# Patient Record
Sex: FEMALE | Race: WHITE | NOT HISPANIC OR LATINO | Employment: OTHER | RURAL
[De-identification: names, ages, dates, MRNs, and addresses within clinical notes are randomized per-mention and may not be internally consistent; named-entity substitution may affect disease eponyms.]

---

## 2017-01-10 ENCOUNTER — HISTORICAL (OUTPATIENT)
Dept: ADMINISTRATIVE | Facility: HOSPITAL | Age: 72
End: 2017-01-10

## 2017-01-11 LAB
LAB AP CLINICAL INFORMATION: NORMAL
LAB AP DIAGNOSIS - HISTORICAL: NORMAL
LAB AP GROSS PATHOLOGY - HISTORICAL: NORMAL
LAB AP SPECIMEN SUBMITTED - HISTORICAL: NORMAL

## 2018-08-29 ENCOUNTER — HISTORICAL (OUTPATIENT)
Dept: ADMINISTRATIVE | Facility: HOSPITAL | Age: 73
End: 2018-08-29

## 2020-06-23 ENCOUNTER — HISTORICAL (OUTPATIENT)
Dept: ADMINISTRATIVE | Facility: HOSPITAL | Age: 75
End: 2020-06-23

## 2020-08-25 ENCOUNTER — HISTORICAL (OUTPATIENT)
Dept: ADMINISTRATIVE | Facility: HOSPITAL | Age: 75
End: 2020-08-25

## 2020-08-27 LAB
INSULIN SERPL-ACNC: NORMAL U[IU]/ML
LAB AP CLINICAL INFORMATION: NORMAL
LAB AP COMMENTS: NORMAL
LAB AP DIAGNOSIS - HISTORICAL: NORMAL
LAB AP GROSS PATHOLOGY - HISTORICAL: NORMAL
LAB AP SPECIMEN SUBMITTED - HISTORICAL: NORMAL

## 2021-04-01 ENCOUNTER — OFFICE VISIT (OUTPATIENT)
Dept: FAMILY MEDICINE | Facility: CLINIC | Age: 76
End: 2021-04-01
Payer: MEDICARE

## 2021-04-01 ENCOUNTER — APPOINTMENT (OUTPATIENT)
Dept: RADIOLOGY | Facility: CLINIC | Age: 76
End: 2021-04-01
Attending: FAMILY MEDICINE
Payer: MEDICARE

## 2021-04-01 ENCOUNTER — HOSPITAL ENCOUNTER (OUTPATIENT)
Dept: RADIOLOGY | Facility: HOSPITAL | Age: 76
Discharge: HOME | End: 2021-04-01
Attending: FAMILY MEDICINE
Payer: MEDICARE

## 2021-04-01 VITALS
HEART RATE: 61 BPM | OXYGEN SATURATION: 98 % | TEMPERATURE: 98 F | SYSTOLIC BLOOD PRESSURE: 144 MMHG | BODY MASS INDEX: 22.29 KG/M2 | DIASTOLIC BLOOD PRESSURE: 80 MMHG | HEIGHT: 67 IN | RESPIRATION RATE: 18 BRPM | WEIGHT: 142 LBS

## 2021-04-01 DIAGNOSIS — M54.9 BACK PAIN, UNSPECIFIED BACK LOCATION, UNSPECIFIED BACK PAIN LATERALITY, UNSPECIFIED CHRONICITY: Primary | ICD-10-CM

## 2021-04-01 DIAGNOSIS — M54.9 DORSALGIA, UNSPECIFIED: ICD-10-CM

## 2021-04-01 DIAGNOSIS — M54.50 LOW BACK PAIN WITHOUT SCIATICA, UNSPECIFIED BACK PAIN LATERALITY, UNSPECIFIED CHRONICITY: Chronic | ICD-10-CM

## 2021-04-01 DIAGNOSIS — M54.9 BACK PAIN, UNSPECIFIED BACK LOCATION, UNSPECIFIED BACK PAIN LATERALITY, UNSPECIFIED CHRONICITY: ICD-10-CM

## 2021-04-01 DIAGNOSIS — M47.817 SPONDYLOSIS WITHOUT MYELOPATHY OR RADICULOPATHY, LUMBOSACRAL REGION: ICD-10-CM

## 2021-04-01 PROCEDURE — 72100 X-RAY EXAM L-S SPINE 2/3 VWS: CPT | Mod: 26,,, | Performed by: RADIOLOGY

## 2021-04-01 PROCEDURE — 72100 XR LUMBAR SPINE 2 OR 3 VIEWS: ICD-10-PCS | Mod: 26,,, | Performed by: RADIOLOGY

## 2021-04-01 PROCEDURE — 99213 PR OFFICE/OUTPT VISIT, EST, LEVL III, 20-29 MIN: ICD-10-PCS | Mod: ,,, | Performed by: FAMILY MEDICINE

## 2021-04-01 PROCEDURE — 72100 X-RAY EXAM L-S SPINE 2/3 VWS: CPT | Mod: TC,RHCUB | Performed by: FAMILY MEDICINE

## 2021-04-01 PROCEDURE — 99213 OFFICE O/P EST LOW 20 MIN: CPT | Mod: ,,, | Performed by: FAMILY MEDICINE

## 2021-04-01 RX ORDER — MECLIZINE HYDROCHLORIDE 25 MG/1
25 TABLET ORAL 3 TIMES DAILY PRN
COMMUNITY
End: 2022-03-02 | Stop reason: SDUPTHER

## 2021-04-01 RX ORDER — POLYETHYLENE GLYCOL 3350 17 G/17G
POWDER, FOR SOLUTION ORAL
COMMUNITY
End: 2023-11-21

## 2021-04-01 RX ORDER — FLUTICASONE PROPIONATE 50 MCG
1 SPRAY, SUSPENSION (ML) NASAL DAILY
COMMUNITY
End: 2021-04-09 | Stop reason: SDUPTHER

## 2021-04-05 DIAGNOSIS — F01.518 VASCULAR DEMENTIA WITH BEHAVIOR DISTURBANCE: Primary | ICD-10-CM

## 2021-04-05 RX ORDER — MEMANTINE HYDROCHLORIDE 10 MG/1
10 TABLET ORAL 2 TIMES DAILY
Qty: 180 TABLET | Refills: 3 | Status: SHIPPED | OUTPATIENT
Start: 2021-04-05 | End: 2022-03-02 | Stop reason: SDUPTHER

## 2021-04-05 RX ORDER — DONEPEZIL HYDROCHLORIDE 10 MG/1
10 TABLET, FILM COATED ORAL NIGHTLY
Qty: 90 TABLET | Refills: 3 | Status: SHIPPED | OUTPATIENT
Start: 2021-04-05 | End: 2022-03-02 | Stop reason: SDUPTHER

## 2021-04-09 DIAGNOSIS — K21.9 GASTROESOPHAGEAL REFLUX DISEASE, UNSPECIFIED WHETHER ESOPHAGITIS PRESENT: ICD-10-CM

## 2021-04-09 DIAGNOSIS — I10 ESSENTIAL HYPERTENSION: Primary | ICD-10-CM

## 2021-04-09 DIAGNOSIS — E78.2 MIXED HYPERLIPIDEMIA: ICD-10-CM

## 2021-04-09 DIAGNOSIS — E87.6 HYPOKALEMIA: ICD-10-CM

## 2021-04-09 DIAGNOSIS — J30.9 ALLERGIC RHINITIS, UNSPECIFIED SEASONALITY, UNSPECIFIED TRIGGER: ICD-10-CM

## 2021-04-09 DIAGNOSIS — F32.A DEPRESSION, UNSPECIFIED DEPRESSION TYPE: ICD-10-CM

## 2021-04-09 RX ORDER — HYDROCHLOROTHIAZIDE 25 MG/1
25 TABLET ORAL DAILY
Qty: 90 TABLET | Refills: 3 | Status: SHIPPED | OUTPATIENT
Start: 2021-04-09 | End: 2022-03-02 | Stop reason: SDUPTHER

## 2021-04-09 RX ORDER — FLUTICASONE PROPIONATE 50 MCG
1 SPRAY, SUSPENSION (ML) NASAL DAILY
Qty: 48 G | Refills: 3 | Status: SHIPPED | OUTPATIENT
Start: 2021-04-09

## 2021-04-09 RX ORDER — ATORVASTATIN CALCIUM 40 MG/1
40 TABLET, FILM COATED ORAL DAILY
Qty: 90 TABLET | Refills: 3 | Status: SHIPPED | OUTPATIENT
Start: 2021-04-09 | End: 2022-03-02 | Stop reason: SDUPTHER

## 2021-04-09 RX ORDER — POTASSIUM CHLORIDE 750 MG/1
10 CAPSULE, EXTENDED RELEASE ORAL 2 TIMES DAILY
Qty: 180 CAPSULE | Refills: 3 | Status: SHIPPED | OUTPATIENT
Start: 2021-04-09 | End: 2022-03-02 | Stop reason: SDUPTHER

## 2021-04-09 RX ORDER — ESOMEPRAZOLE MAGNESIUM 40 MG/1
40 CAPSULE, DELAYED RELEASE ORAL
Qty: 90 CAPSULE | Refills: 3 | Status: SHIPPED | OUTPATIENT
Start: 2021-04-09 | End: 2021-04-16 | Stop reason: CLARIF

## 2021-04-09 RX ORDER — CITALOPRAM 40 MG/1
40 TABLET, FILM COATED ORAL DAILY
Qty: 90 TABLET | Refills: 3 | Status: SHIPPED | OUTPATIENT
Start: 2021-04-09 | End: 2022-03-02 | Stop reason: SDUPTHER

## 2021-05-04 ENCOUNTER — HOSPITAL ENCOUNTER (OUTPATIENT)
Dept: RADIOLOGY | Facility: HOSPITAL | Age: 76
Discharge: HOME | End: 2021-05-04
Attending: NURSE PRACTITIONER
Payer: MEDICARE

## 2021-05-04 ENCOUNTER — OFFICE VISIT (OUTPATIENT)
Dept: GASTROENTEROLOGY | Facility: CLINIC | Age: 76
End: 2021-05-04
Payer: MEDICARE

## 2021-05-04 VITALS
SYSTOLIC BLOOD PRESSURE: 116 MMHG | HEIGHT: 66 IN | WEIGHT: 135 LBS | BODY MASS INDEX: 21.69 KG/M2 | OXYGEN SATURATION: 94 % | HEART RATE: 79 BPM | DIASTOLIC BLOOD PRESSURE: 72 MMHG

## 2021-05-04 DIAGNOSIS — K59.00 CONSTIPATION, UNSPECIFIED CONSTIPATION TYPE: ICD-10-CM

## 2021-05-04 DIAGNOSIS — Z86.010 PERSONAL HISTORY OF COLONIC POLYPS: ICD-10-CM

## 2021-05-04 DIAGNOSIS — K59.00 CONSTIPATION, UNSPECIFIED CONSTIPATION TYPE: Primary | ICD-10-CM

## 2021-05-04 PROBLEM — Z86.0100 PERSONAL HISTORY OF COLONIC POLYPS: Status: ACTIVE | Noted: 2021-05-04

## 2021-05-04 PROCEDURE — 74019 RADEX ABDOMEN 2 VIEWS: CPT | Mod: TC

## 2021-05-04 PROCEDURE — 99214 PR OFFICE/OUTPT VISIT, EST, LEVL IV, 30-39 MIN: ICD-10-PCS | Mod: ,,, | Performed by: NURSE PRACTITIONER

## 2021-05-04 PROCEDURE — 74019 XR ABDOMEN FLAT AND ERECT: ICD-10-PCS | Mod: 26,,, | Performed by: RADIOLOGY

## 2021-05-04 PROCEDURE — 74019 RADEX ABDOMEN 2 VIEWS: CPT | Mod: 26,,, | Performed by: RADIOLOGY

## 2021-05-04 PROCEDURE — 99214 OFFICE O/P EST MOD 30 MIN: CPT | Mod: ,,, | Performed by: NURSE PRACTITIONER

## 2021-05-05 ENCOUNTER — TELEPHONE (OUTPATIENT)
Dept: GASTROENTEROLOGY | Facility: CLINIC | Age: 76
End: 2021-05-05

## 2021-05-17 PROCEDURE — 90853 GROUP PSYCHOTHERAPY: CPT

## 2021-05-19 PROCEDURE — 90853 GROUP PSYCHOTHERAPY: CPT

## 2021-05-20 PROCEDURE — 90853 GROUP PSYCHOTHERAPY: CPT

## 2021-05-25 ENCOUNTER — OFFICE VISIT (OUTPATIENT)
Dept: NEUROLOGY | Facility: CLINIC | Age: 76
End: 2021-05-25
Payer: MEDICARE

## 2021-05-25 VITALS
DIASTOLIC BLOOD PRESSURE: 78 MMHG | HEART RATE: 72 BPM | HEIGHT: 67 IN | BODY MASS INDEX: 21.5 KG/M2 | SYSTOLIC BLOOD PRESSURE: 126 MMHG | WEIGHT: 137 LBS | OXYGEN SATURATION: 96 % | RESPIRATION RATE: 18 BRPM

## 2021-05-25 DIAGNOSIS — G47.00 INSOMNIA, UNSPECIFIED TYPE: ICD-10-CM

## 2021-05-25 DIAGNOSIS — F02.818 EARLY ONSET ALZHEIMER'S DISEASE WITH BEHAVIORAL DISTURBANCE: Primary | ICD-10-CM

## 2021-05-25 DIAGNOSIS — G30.0 EARLY ONSET ALZHEIMER'S DISEASE WITH BEHAVIORAL DISTURBANCE: Primary | ICD-10-CM

## 2021-05-25 PROCEDURE — 99214 OFFICE O/P EST MOD 30 MIN: CPT | Mod: PBBFAC | Performed by: NURSE PRACTITIONER

## 2021-05-25 PROCEDURE — 90853 GROUP PSYCHOTHERAPY: CPT

## 2021-05-25 PROCEDURE — 99214 PR OFFICE/OUTPT VISIT, EST, LEVL IV, 30-39 MIN: ICD-10-PCS | Mod: S$PBB,,, | Performed by: NURSE PRACTITIONER

## 2021-05-25 PROCEDURE — 99214 OFFICE O/P EST MOD 30 MIN: CPT | Mod: S$PBB,,, | Performed by: NURSE PRACTITIONER

## 2021-05-25 RX ORDER — TRAZODONE HYDROCHLORIDE 50 MG/1
TABLET ORAL
Qty: 60 TABLET | Refills: 5 | Status: SHIPPED | OUTPATIENT
Start: 2021-05-25 | End: 2021-06-28 | Stop reason: SDUPTHER

## 2021-05-25 RX ORDER — TRAZODONE HYDROCHLORIDE 50 MG/1
50 TABLET ORAL NIGHTLY
Qty: 30 TABLET | Refills: 11 | Status: SHIPPED | OUTPATIENT
Start: 2021-05-25 | End: 2021-05-25 | Stop reason: CLARIF

## 2021-05-28 PROCEDURE — 90853 GROUP PSYCHOTHERAPY: CPT

## 2021-06-01 PROCEDURE — 90853 GROUP PSYCHOTHERAPY: CPT

## 2021-06-04 RX ORDER — ONDANSETRON 4 MG/1
4 TABLET, FILM COATED ORAL EVERY 8 HOURS PRN
Qty: 30 TABLET | Refills: 0 | Status: SHIPPED | OUTPATIENT
Start: 2021-06-04 | End: 2022-03-02

## 2021-06-10 PROCEDURE — 90853 GROUP PSYCHOTHERAPY: CPT

## 2021-06-11 PROCEDURE — 90853 GROUP PSYCHOTHERAPY: CPT

## 2021-06-24 PROCEDURE — 90832 PSYTX W PT 30 MINUTES: CPT | Mod: 59

## 2021-06-24 PROCEDURE — 90853 GROUP PSYCHOTHERAPY: CPT

## 2021-06-25 PROCEDURE — 90853 GROUP PSYCHOTHERAPY: CPT

## 2021-06-28 DIAGNOSIS — G47.00 INSOMNIA, UNSPECIFIED TYPE: ICD-10-CM

## 2021-06-28 RX ORDER — TRAZODONE HYDROCHLORIDE 50 MG/1
100 TABLET ORAL NIGHTLY
Qty: 180 TABLET | Refills: 3 | Status: SHIPPED | OUTPATIENT
Start: 2021-06-28 | End: 2022-03-02 | Stop reason: SDUPTHER

## 2021-06-29 PROCEDURE — 90853 GROUP PSYCHOTHERAPY: CPT

## 2021-07-01 PROCEDURE — 90853 GROUP PSYCHOTHERAPY: CPT

## 2021-07-06 PROCEDURE — 90853 GROUP PSYCHOTHERAPY: CPT

## 2021-07-08 PROCEDURE — 90853 GROUP PSYCHOTHERAPY: CPT

## 2021-07-11 ENCOUNTER — APPOINTMENT (OUTPATIENT)
Dept: RADIOLOGY | Facility: CLINIC | Age: 76
End: 2021-07-11
Attending: NURSE PRACTITIONER
Payer: MEDICARE

## 2021-07-11 ENCOUNTER — OFFICE VISIT (OUTPATIENT)
Dept: FAMILY MEDICINE | Facility: CLINIC | Age: 76
End: 2021-07-11
Payer: MEDICARE
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
## 2021-07-11 DIAGNOSIS — M25.531 WRIST PAIN, ACUTE, RIGHT: Primary | ICD-10-CM

## 2021-07-11 DIAGNOSIS — M25.531 WRIST PAIN, ACUTE, RIGHT: ICD-10-CM

## 2021-07-11 PROCEDURE — 96372 PR INJECTION,THERAP/PROPH/DIAG2ST, IM OR SUBCUT: ICD-10-PCS | Mod: ,,, | Performed by: NURSE PRACTITIONER

## 2021-07-11 PROCEDURE — 99051 PR MEDICAL SERVICES, EVE/WKEND/HOLIDAY: ICD-10-PCS | Mod: ,,, | Performed by: NURSE PRACTITIONER

## 2021-07-11 PROCEDURE — 73090 X-RAY EXAM OF FOREARM: CPT | Mod: TC,RHCUB,RT | Performed by: NURSE PRACTITIONER

## 2021-07-11 PROCEDURE — 96372 THER/PROPH/DIAG INJ SC/IM: CPT | Mod: ,,, | Performed by: NURSE PRACTITIONER

## 2021-07-11 PROCEDURE — 99051 MED SERV EVE/WKEND/HOLIDAY: CPT | Mod: ,,, | Performed by: NURSE PRACTITIONER

## 2021-07-11 PROCEDURE — 99203 OFFICE O/P NEW LOW 30 MIN: CPT | Mod: 25,,, | Performed by: NURSE PRACTITIONER

## 2021-07-11 PROCEDURE — 99203 PR OFFICE/OUTPT VISIT, NEW, LEVL III, 30-44 MIN: ICD-10-PCS | Mod: 25,,, | Performed by: NURSE PRACTITIONER

## 2021-07-11 RX ORDER — IBUPROFEN 800 MG/1
800 TABLET ORAL 3 TIMES DAILY
Qty: 30 TABLET | Refills: 0 | Status: SHIPPED | OUTPATIENT
Start: 2021-07-11 | End: 2022-03-02

## 2021-07-11 RX ORDER — KETOROLAC TROMETHAMINE 30 MG/ML
30 INJECTION, SOLUTION INTRAMUSCULAR; INTRAVENOUS
Status: COMPLETED | OUTPATIENT
Start: 2021-07-11 | End: 2021-07-11

## 2021-07-11 RX ADMIN — KETOROLAC TROMETHAMINE 30 MG: 30 INJECTION, SOLUTION INTRAMUSCULAR; INTRAVENOUS at 09:07

## 2021-07-13 PROCEDURE — 90853 GROUP PSYCHOTHERAPY: CPT

## 2021-07-15 PROCEDURE — 90853 GROUP PSYCHOTHERAPY: CPT

## 2021-07-22 PROCEDURE — 90853 GROUP PSYCHOTHERAPY: CPT

## 2021-07-23 PROCEDURE — 90853 GROUP PSYCHOTHERAPY: CPT

## 2021-07-23 PROCEDURE — 90832 PSYTX W PT 30 MINUTES: CPT

## 2021-07-27 PROCEDURE — 90853 GROUP PSYCHOTHERAPY: CPT

## 2021-08-03 PROCEDURE — 90853 GROUP PSYCHOTHERAPY: CPT

## 2021-08-06 PROCEDURE — 90832 PSYTX W PT 30 MINUTES: CPT | Mod: 59

## 2021-08-06 PROCEDURE — 90853 GROUP PSYCHOTHERAPY: CPT

## 2021-08-12 PROCEDURE — 90853 GROUP PSYCHOTHERAPY: CPT

## 2021-08-13 PROCEDURE — 90853 GROUP PSYCHOTHERAPY: CPT

## 2021-08-19 PROCEDURE — 90853 GROUP PSYCHOTHERAPY: CPT

## 2021-08-20 PROCEDURE — 90853 GROUP PSYCHOTHERAPY: CPT

## 2021-08-26 PROCEDURE — 90853 GROUP PSYCHOTHERAPY: CPT

## 2021-08-27 PROCEDURE — 90853 GROUP PSYCHOTHERAPY: CPT

## 2021-09-02 PROCEDURE — 90853 GROUP PSYCHOTHERAPY: CPT

## 2021-09-03 PROCEDURE — 90853 GROUP PSYCHOTHERAPY: CPT

## 2021-09-09 PROCEDURE — 90853 GROUP PSYCHOTHERAPY: CPT

## 2021-09-10 PROCEDURE — 90832 PSYTX W PT 30 MINUTES: CPT

## 2021-09-10 PROCEDURE — 90853 GROUP PSYCHOTHERAPY: CPT

## 2021-09-14 PROCEDURE — 90853 GROUP PSYCHOTHERAPY: CPT

## 2021-09-16 PROCEDURE — 90853 GROUP PSYCHOTHERAPY: CPT

## 2021-09-21 PROCEDURE — 90853 GROUP PSYCHOTHERAPY: CPT

## 2021-09-24 PROCEDURE — 90853 GROUP PSYCHOTHERAPY: CPT

## 2021-09-30 ENCOUNTER — TELEPHONE (OUTPATIENT)
Dept: FAMILY MEDICINE | Facility: CLINIC | Age: 76
End: 2021-09-30

## 2021-09-30 DIAGNOSIS — R21 RASH: Primary | ICD-10-CM

## 2021-09-30 PROCEDURE — 90853 GROUP PSYCHOTHERAPY: CPT

## 2021-09-30 RX ORDER — METHYLPREDNISOLONE 4 MG/1
TABLET ORAL
Qty: 1 PACKAGE | Refills: 0 | Status: SHIPPED | OUTPATIENT
Start: 2021-09-30 | End: 2022-03-02 | Stop reason: ALTCHOICE

## 2021-10-05 ENCOUNTER — OFFICE VISIT (OUTPATIENT)
Dept: FAMILY MEDICINE | Facility: CLINIC | Age: 76
End: 2021-10-05
Payer: MEDICARE

## 2021-10-05 VITALS
SYSTOLIC BLOOD PRESSURE: 132 MMHG | HEART RATE: 89 BPM | WEIGHT: 128 LBS | OXYGEN SATURATION: 98 % | TEMPERATURE: 98 F | HEIGHT: 67 IN | DIASTOLIC BLOOD PRESSURE: 74 MMHG | BODY MASS INDEX: 20.09 KG/M2

## 2021-10-05 DIAGNOSIS — L20.89 OTHER ATOPIC DERMATITIS: Primary | ICD-10-CM

## 2021-10-05 DIAGNOSIS — L03.312 CELLULITIS OF BACK: ICD-10-CM

## 2021-10-05 PROCEDURE — 99213 OFFICE O/P EST LOW 20 MIN: CPT | Mod: ,,, | Performed by: FAMILY MEDICINE

## 2021-10-05 PROCEDURE — 99213 PR OFFICE/OUTPT VISIT, EST, LEVL III, 20-29 MIN: ICD-10-PCS | Mod: ,,, | Performed by: FAMILY MEDICINE

## 2021-10-05 RX ORDER — TRIAMCINOLONE ACETONIDE 1 MG/G
CREAM TOPICAL 2 TIMES DAILY
Qty: 45 G | Refills: 0 | Status: SHIPPED | OUTPATIENT
Start: 2021-10-05 | End: 2022-03-02

## 2021-10-05 RX ORDER — HYDROXYZINE PAMOATE 25 MG/1
25 CAPSULE ORAL EVERY 8 HOURS PRN
Qty: 30 CAPSULE | Refills: 0 | Status: SHIPPED | OUTPATIENT
Start: 2021-10-05 | End: 2022-03-02

## 2021-10-05 RX ORDER — CEPHALEXIN 500 MG/1
500 CAPSULE ORAL EVERY 12 HOURS
Qty: 14 CAPSULE | Refills: 0 | Status: SHIPPED | OUTPATIENT
Start: 2021-10-05 | End: 2022-03-02

## 2021-10-07 PROCEDURE — 90853 GROUP PSYCHOTHERAPY: CPT

## 2021-10-15 PROCEDURE — 90853 GROUP PSYCHOTHERAPY: CPT

## 2021-10-19 PROCEDURE — 90832 PSYTX W PT 30 MINUTES: CPT

## 2021-10-19 PROCEDURE — 90853 GROUP PSYCHOTHERAPY: CPT

## 2021-10-26 PROCEDURE — 90853 GROUP PSYCHOTHERAPY: CPT

## 2021-11-02 PROCEDURE — 90853 GROUP PSYCHOTHERAPY: CPT

## 2021-11-08 ENCOUNTER — TELEPHONE (OUTPATIENT)
Dept: FAMILY MEDICINE | Facility: CLINIC | Age: 76
End: 2021-11-08
Payer: COMMERCIAL

## 2021-11-08 DIAGNOSIS — J06.9 UPPER RESPIRATORY TRACT INFECTION, UNSPECIFIED TYPE: Primary | ICD-10-CM

## 2021-11-08 RX ORDER — AZITHROMYCIN 250 MG/1
TABLET, FILM COATED ORAL
Qty: 6 TABLET | Refills: 0 | Status: SHIPPED | OUTPATIENT
Start: 2021-11-08 | End: 2022-03-02 | Stop reason: ALTCHOICE

## 2021-11-16 PROCEDURE — 90832 PSYTX W PT 30 MINUTES: CPT

## 2021-11-16 PROCEDURE — 90853 GROUP PSYCHOTHERAPY: CPT

## 2021-11-23 PROCEDURE — 90853 GROUP PSYCHOTHERAPY: CPT

## 2022-01-04 ENCOUNTER — HOSPITAL ENCOUNTER (OUTPATIENT)
Dept: RADIOLOGY | Facility: HOSPITAL | Age: 77
Discharge: HOME | End: 2022-01-04
Payer: MEDICARE

## 2022-01-04 ENCOUNTER — HOSPITAL ENCOUNTER (OUTPATIENT)
Dept: RADIOLOGY | Facility: HOSPITAL | Age: 77
Discharge: HOME | End: 2022-01-04
Attending: RADIOLOGY
Payer: MEDICARE

## 2022-01-04 VITALS — WEIGHT: 105 LBS | HEIGHT: 66 IN | BODY MASS INDEX: 16.88 KG/M2

## 2022-01-04 DIAGNOSIS — Z12.31 VISIT FOR SCREENING MAMMOGRAM: ICD-10-CM

## 2022-01-04 DIAGNOSIS — R92.8 ABNORMAL MAMMOGRAM: ICD-10-CM

## 2022-01-04 PROCEDURE — 76641 ULTRASOUND BREAST COMPLETE: CPT | Mod: 26,50,, | Performed by: RADIOLOGY

## 2022-01-04 PROCEDURE — 76641 ULTRASOUND BREAST COMPLETE: CPT | Mod: TC,50

## 2022-01-04 PROCEDURE — 76641 US BREAST BILATERAL COMPLETE: ICD-10-PCS | Mod: 26,50,, | Performed by: RADIOLOGY

## 2022-01-04 PROCEDURE — 77067 MAMMO DIGITAL SCREENING BILAT: ICD-10-PCS | Mod: 26,,, | Performed by: RADIOLOGY

## 2022-01-04 PROCEDURE — 77067 SCR MAMMO BI INCL CAD: CPT | Mod: 26,,, | Performed by: RADIOLOGY

## 2022-01-04 PROCEDURE — 77067 SCR MAMMO BI INCL CAD: CPT | Mod: TC

## 2022-03-01 DIAGNOSIS — I10 HYPERTENSION, UNSPECIFIED TYPE: Primary | ICD-10-CM

## 2022-03-01 DIAGNOSIS — E78.5 HYPERLIPIDEMIA, UNSPECIFIED HYPERLIPIDEMIA TYPE: ICD-10-CM

## 2022-03-01 DIAGNOSIS — E55.9 VITAMIN D DEFICIENCY: ICD-10-CM

## 2022-03-02 ENCOUNTER — OFFICE VISIT (OUTPATIENT)
Dept: FAMILY MEDICINE | Facility: CLINIC | Age: 77
End: 2022-03-02
Payer: MEDICARE

## 2022-03-02 VITALS
OXYGEN SATURATION: 97 % | SYSTOLIC BLOOD PRESSURE: 104 MMHG | HEIGHT: 66 IN | WEIGHT: 126 LBS | HEART RATE: 76 BPM | RESPIRATION RATE: 16 BRPM | DIASTOLIC BLOOD PRESSURE: 70 MMHG | TEMPERATURE: 97 F | BODY MASS INDEX: 20.25 KG/M2

## 2022-03-02 DIAGNOSIS — N18.2 TYPE 2 DM WITH CKD STAGE 2 AND HYPERTENSION: ICD-10-CM

## 2022-03-02 DIAGNOSIS — I10 PRIMARY HYPERTENSION: Primary | ICD-10-CM

## 2022-03-02 DIAGNOSIS — I12.9 TYPE 2 DM WITH CKD STAGE 2 AND HYPERTENSION: ICD-10-CM

## 2022-03-02 DIAGNOSIS — R42 VERTIGO: ICD-10-CM

## 2022-03-02 DIAGNOSIS — F32.A DEPRESSION, UNSPECIFIED DEPRESSION TYPE: Chronic | ICD-10-CM

## 2022-03-02 DIAGNOSIS — Z23 NEED FOR VACCINATION: ICD-10-CM

## 2022-03-02 DIAGNOSIS — E55.9 VITAMIN D DEFICIENCY: Chronic | ICD-10-CM

## 2022-03-02 DIAGNOSIS — F01.518 VASCULAR DEMENTIA WITH BEHAVIOR DISTURBANCE: Chronic | ICD-10-CM

## 2022-03-02 DIAGNOSIS — Z78.0 MENOPAUSE: ICD-10-CM

## 2022-03-02 DIAGNOSIS — G47.00 INSOMNIA, UNSPECIFIED TYPE: Chronic | ICD-10-CM

## 2022-03-02 DIAGNOSIS — E11.22 TYPE 2 DM WITH CKD STAGE 2 AND HYPERTENSION: ICD-10-CM

## 2022-03-02 DIAGNOSIS — E87.6 HYPOKALEMIA: ICD-10-CM

## 2022-03-02 DIAGNOSIS — E78.2 MIXED HYPERLIPIDEMIA: ICD-10-CM

## 2022-03-02 LAB
25(OH)D3 SERPL-MCNC: 24.4 NG/ML
ALBUMIN SERPL BCP-MCNC: 3.9 G/DL (ref 3.5–5)
ALBUMIN/GLOB SERPL: 1.1 {RATIO}
ALP SERPL-CCNC: 188 U/L (ref 55–142)
ALT SERPL W P-5'-P-CCNC: 40 U/L (ref 13–56)
ANION GAP SERPL CALCULATED.3IONS-SCNC: 9 MMOL/L (ref 7–16)
AST SERPL W P-5'-P-CCNC: 21 U/L (ref 15–37)
BACTERIA #/AREA URNS HPF: ABNORMAL /HPF
BASOPHILS # BLD AUTO: 0.05 K/UL (ref 0–0.2)
BASOPHILS NFR BLD AUTO: 0.7 % (ref 0–1)
BILIRUB SERPL-MCNC: 0.4 MG/DL (ref 0–1.2)
BILIRUB UR QL STRIP: NEGATIVE
BUN SERPL-MCNC: 17 MG/DL (ref 7–18)
BUN/CREAT SERPL: 18 (ref 6–20)
CALCIUM SERPL-MCNC: 9.2 MG/DL (ref 8.5–10.1)
CHLORIDE SERPL-SCNC: 100 MMOL/L (ref 98–107)
CHOLEST SERPL-MCNC: 138 MG/DL (ref 0–200)
CHOLEST/HDLC SERPL: 2.3 {RATIO}
CLARITY UR: ABNORMAL
CO2 SERPL-SCNC: 32 MMOL/L (ref 21–32)
COLOR UR: YELLOW
CREAT SERPL-MCNC: 0.94 MG/DL (ref 0.55–1.02)
DIFFERENTIAL METHOD BLD: ABNORMAL
EOSINOPHIL # BLD AUTO: 0.13 K/UL (ref 0–0.5)
EOSINOPHIL NFR BLD AUTO: 1.7 % (ref 1–4)
ERYTHROCYTE [DISTWIDTH] IN BLOOD BY AUTOMATED COUNT: 12.8 % (ref 11.5–14.5)
GLOBULIN SER-MCNC: 3.5 G/DL (ref 2–4)
GLUCOSE SERPL-MCNC: 102 MG/DL (ref 74–106)
GLUCOSE UR STRIP-MCNC: NEGATIVE MG/DL
HCT VFR BLD AUTO: 44.1 % (ref 38–47)
HDLC SERPL-MCNC: 59 MG/DL (ref 40–60)
HGB BLD-MCNC: 14.4 G/DL (ref 12–16)
IMM GRANULOCYTES # BLD AUTO: 0.01 K/UL (ref 0–0.04)
IMM GRANULOCYTES NFR BLD: 0.1 % (ref 0–0.4)
KETONES UR STRIP-SCNC: NEGATIVE MG/DL
LDLC SERPL CALC-MCNC: 59 MG/DL
LDLC/HDLC SERPL: 1 {RATIO}
LEUKOCYTE ESTERASE UR QL STRIP: ABNORMAL
LYMPHOCYTES # BLD AUTO: 2.02 K/UL (ref 1–4.8)
LYMPHOCYTES NFR BLD AUTO: 26.6 % (ref 27–41)
MCH RBC QN AUTO: 29.1 PG (ref 27–31)
MCHC RBC AUTO-ENTMCNC: 32.7 G/DL (ref 32–36)
MCV RBC AUTO: 89.3 FL (ref 80–96)
MONOCYTES # BLD AUTO: 0.6 K/UL (ref 0–0.8)
MONOCYTES NFR BLD AUTO: 7.9 % (ref 2–6)
MPC BLD CALC-MCNC: 10.5 FL (ref 9.4–12.4)
NEUTROPHILS # BLD AUTO: 4.79 K/UL (ref 1.8–7.7)
NEUTROPHILS NFR BLD AUTO: 63 % (ref 53–65)
NITRITE UR QL STRIP: NEGATIVE
NONHDLC SERPL-MCNC: 79 MG/DL
NRBC # BLD AUTO: 0 X10E3/UL
NRBC, AUTO (.00): 0 %
PH UR STRIP: 7 PH UNITS
PLATELET # BLD AUTO: 232 K/UL (ref 150–400)
POTASSIUM SERPL-SCNC: 3.1 MMOL/L (ref 3.5–5.1)
PROT SERPL-MCNC: 7.4 G/DL (ref 6.4–8.2)
PROT UR QL STRIP: NEGATIVE
RBC # BLD AUTO: 4.94 M/UL (ref 4.2–5.4)
RBC # UR STRIP: NEGATIVE /UL
RBC #/AREA URNS HPF: ABNORMAL /HPF
SODIUM SERPL-SCNC: 138 MMOL/L (ref 136–145)
SP GR UR STRIP: 1.01
SQUAMOUS #/AREA URNS LPF: ABNORMAL /LPF
TRIGL SERPL-MCNC: 99 MG/DL (ref 35–150)
TSH SERPL DL<=0.005 MIU/L-ACNC: 2.42 UIU/ML (ref 0.36–3.74)
UROBILINOGEN UR STRIP-ACNC: 0.2 MG/DL
VLDLC SERPL-MCNC: 20 MG/DL
WBC # BLD AUTO: 7.6 K/UL (ref 4.5–11)
WBC #/AREA URNS HPF: ABNORMAL /HPF

## 2022-03-02 PROCEDURE — 87086 URINE CULTURE/COLONY COUNT: CPT | Mod: GZ,,, | Performed by: CLINICAL MEDICAL LABORATORY

## 2022-03-02 PROCEDURE — 84443 ASSAY THYROID STIM HORMONE: CPT | Mod: ,,, | Performed by: CLINICAL MEDICAL LABORATORY

## 2022-03-02 PROCEDURE — 80061 LIPID PANEL: CPT | Mod: ,,, | Performed by: CLINICAL MEDICAL LABORATORY

## 2022-03-02 PROCEDURE — 83036 HEMOGLOBIN GLYCOSYLATED A1C: CPT | Mod: ,,, | Performed by: CLINICAL MEDICAL LABORATORY

## 2022-03-02 PROCEDURE — 87086 CULTURE, URINE: ICD-10-PCS | Mod: GZ,,, | Performed by: CLINICAL MEDICAL LABORATORY

## 2022-03-02 PROCEDURE — 83036 HEMOGLOBIN A1C: ICD-10-PCS | Mod: ,,, | Performed by: CLINICAL MEDICAL LABORATORY

## 2022-03-02 PROCEDURE — 82306 VITAMIN D: ICD-10-PCS | Mod: ,,, | Performed by: CLINICAL MEDICAL LABORATORY

## 2022-03-02 PROCEDURE — 90662 IIV NO PRSV INCREASED AG IM: CPT | Mod: ,,, | Performed by: FAMILY MEDICINE

## 2022-03-02 PROCEDURE — 81001 URINALYSIS AUTO W/SCOPE: CPT | Mod: ,,, | Performed by: CLINICAL MEDICAL LABORATORY

## 2022-03-02 PROCEDURE — 81001 URINALYSIS, REFLEX TO URINE CULTURE: ICD-10-PCS | Mod: ,,, | Performed by: CLINICAL MEDICAL LABORATORY

## 2022-03-02 PROCEDURE — 99214 PR OFFICE/OUTPT VISIT, EST, LEVL IV, 30-39 MIN: ICD-10-PCS | Mod: ,,, | Performed by: FAMILY MEDICINE

## 2022-03-02 PROCEDURE — 85025 COMPLETE CBC W/AUTO DIFF WBC: CPT | Mod: ,,, | Performed by: CLINICAL MEDICAL LABORATORY

## 2022-03-02 PROCEDURE — G0008 ADMIN INFLUENZA VIRUS VAC: HCPCS | Mod: ,,, | Performed by: FAMILY MEDICINE

## 2022-03-02 PROCEDURE — 85025 CBC WITH DIFFERENTIAL: ICD-10-PCS | Mod: ,,, | Performed by: CLINICAL MEDICAL LABORATORY

## 2022-03-02 PROCEDURE — 90662 FLU VACCINE - QUADRIVALENT - HIGH DOSE (65+) PRESERVATIVE FREE IM: ICD-10-PCS | Mod: ,,, | Performed by: FAMILY MEDICINE

## 2022-03-02 PROCEDURE — 82306 VITAMIN D 25 HYDROXY: CPT | Mod: ,,, | Performed by: CLINICAL MEDICAL LABORATORY

## 2022-03-02 PROCEDURE — 80061 LIPID PANEL: ICD-10-PCS | Mod: ,,, | Performed by: CLINICAL MEDICAL LABORATORY

## 2022-03-02 PROCEDURE — 80053 COMPREHENSIVE METABOLIC PANEL: ICD-10-PCS | Mod: ,,, | Performed by: CLINICAL MEDICAL LABORATORY

## 2022-03-02 PROCEDURE — G0008 FLU VACCINE - QUADRIVALENT - HIGH DOSE (65+) PRESERVATIVE FREE IM: ICD-10-PCS | Mod: ,,, | Performed by: FAMILY MEDICINE

## 2022-03-02 PROCEDURE — 99214 OFFICE O/P EST MOD 30 MIN: CPT | Mod: ,,, | Performed by: FAMILY MEDICINE

## 2022-03-02 PROCEDURE — 80053 COMPREHEN METABOLIC PANEL: CPT | Mod: ,,, | Performed by: CLINICAL MEDICAL LABORATORY

## 2022-03-02 PROCEDURE — 84443 TSH: ICD-10-PCS | Mod: ,,, | Performed by: CLINICAL MEDICAL LABORATORY

## 2022-03-02 RX ORDER — MECLIZINE HYDROCHLORIDE 25 MG/1
25 TABLET ORAL 3 TIMES DAILY PRN
Qty: 270 TABLET | Refills: 0 | Status: SHIPPED | OUTPATIENT
Start: 2022-03-02

## 2022-03-02 RX ORDER — POTASSIUM CHLORIDE 750 MG/1
10 CAPSULE, EXTENDED RELEASE ORAL 2 TIMES DAILY
Qty: 180 CAPSULE | Refills: 3 | Status: SHIPPED | OUTPATIENT
Start: 2022-03-02 | End: 2022-03-09 | Stop reason: ALTCHOICE

## 2022-03-02 RX ORDER — ATORVASTATIN CALCIUM 40 MG/1
40 TABLET, FILM COATED ORAL DAILY
Qty: 90 TABLET | Refills: 3 | Status: SHIPPED | OUTPATIENT
Start: 2022-03-02 | End: 2022-07-11 | Stop reason: SDUPTHER

## 2022-03-02 RX ORDER — CITALOPRAM 40 MG/1
40 TABLET, FILM COATED ORAL DAILY
Qty: 90 TABLET | Refills: 3 | Status: SHIPPED | OUTPATIENT
Start: 2022-03-02 | End: 2022-07-07 | Stop reason: SDUPTHER

## 2022-03-02 RX ORDER — DONEPEZIL HYDROCHLORIDE 10 MG/1
10 TABLET, FILM COATED ORAL NIGHTLY
Qty: 90 TABLET | Refills: 3 | Status: SHIPPED | OUTPATIENT
Start: 2022-03-02 | End: 2022-04-18

## 2022-03-02 RX ORDER — TRAZODONE HYDROCHLORIDE 50 MG/1
150 TABLET ORAL NIGHTLY
Qty: 270 TABLET | Refills: 3 | Status: SHIPPED | OUTPATIENT
Start: 2022-03-02 | End: 2023-02-13

## 2022-03-02 RX ORDER — HYDROCHLOROTHIAZIDE 25 MG/1
25 TABLET ORAL DAILY
Qty: 90 TABLET | Refills: 3 | Status: SHIPPED | OUTPATIENT
Start: 2022-03-02 | End: 2022-03-09 | Stop reason: ALTCHOICE

## 2022-03-02 RX ORDER — MEMANTINE HYDROCHLORIDE 10 MG/1
10 TABLET ORAL 2 TIMES DAILY
Qty: 180 TABLET | Refills: 3 | Status: SHIPPED | OUTPATIENT
Start: 2022-03-02 | End: 2022-04-18

## 2022-03-02 NOTE — PROGRESS NOTES
"Subjective:       Patient ID: Jane Clark is a 76 y.o. female.    Chief Complaint: Annual Exam    77 YO WF here for check up and yearly lab. Overdue for DEXA and recently overdue for c-scope. Has had 2 J&J covid vaccines, and will bring us the dates. Needs flu shot.     Review of Systems   Constitutional: Negative for activity change.   Eyes: Negative for visual disturbance.   Respiratory: Negative for shortness of breath.    Cardiovascular: Negative for chest pain.   Gastrointestinal: Negative for abdominal pain.   Neurological: Negative for headaches.   Psychiatric/Behavioral: Positive for confusion (slowly progressive dementia) and sleep disturbance. Negative for dysphoric mood.         Objective:     Blood pressure 104/70, pulse 76, temperature 97.2 °F (36.2 °C), temperature source Oral, resp. rate 16, height 5' 6" (1.676 m), weight 57.2 kg (126 lb), SpO2 97 %.      Physical Exam  Constitutional:       Appearance: Normal appearance.   HENT:      Head: Normocephalic and atraumatic.      Right Ear: External ear normal.      Left Ear: External ear normal.      Nose: Nose normal.      Mouth/Throat:      Mouth: Mucous membranes are moist.   Eyes:      Conjunctiva/sclera: Conjunctivae normal.      Pupils: Pupils are equal, round, and reactive to light.   Cardiovascular:      Rate and Rhythm: Normal rate and regular rhythm.      Pulses: Normal pulses.      Heart sounds: Normal heart sounds.   Pulmonary:      Effort: Pulmonary effort is normal.      Breath sounds: Normal breath sounds.   Abdominal:      General: Abdomen is flat.      Palpations: Abdomen is soft.   Musculoskeletal:         General: Normal range of motion.      Cervical back: Normal range of motion and neck supple.   Skin:     General: Skin is warm and dry.   Neurological:      General: No focal deficit present.      Mental Status: She is alert and oriented to person, place, and time.   Psychiatric:         Mood and Affect: Mood normal.         " Behavior: Behavior normal.         Assessment:       Problem List Items Addressed This Visit        Neuro    Vascular dementia with behavior disturbance (Chronic)    Relevant Medications    donepeziL (ARICEPT) 10 MG tablet    memantine (NAMENDA) 10 MG Tab       Psychiatric    Depression (Chronic)    Relevant Medications    citalopram (CELEXA) 40 MG tablet       ENT    Vertigo    Relevant Medications    meclizine (ANTIVERT) 25 mg tablet       Cardiac/Vascular    Mixed hyperlipidemia (Chronic)    Relevant Medications    atorvastatin (LIPITOR) 40 MG tablet    Primary hypertension - Primary (Chronic)    Relevant Medications    hydroCHLOROthiazide (HYDRODIURIL) 25 MG tablet       Renal/    Menopause    Relevant Orders    DXA Bone Density Spine And Hip    Hypokalemia    Relevant Medications    potassium chloride (MICRO-K) 10 MEQ CpSR       Endocrine    Vitamin D deficiency (Chronic)       Other    Insomnia    Relevant Medications    traZODone (DESYREL) 50 MG tablet      Other Visit Diagnoses     Need for vaccination        Relevant Orders    Influenza - Quadrivalent - High Dose (65+) (PF) (IM) (Completed)          Plan:       RTC yearly and prn. May increase the trazadone a little if needed.

## 2022-03-03 DIAGNOSIS — E11.22 TYPE 2 DM WITH CKD STAGE 2 AND HYPERTENSION: Primary | ICD-10-CM

## 2022-03-03 DIAGNOSIS — I12.9 TYPE 2 DM WITH CKD STAGE 2 AND HYPERTENSION: Primary | ICD-10-CM

## 2022-03-03 DIAGNOSIS — N18.2 TYPE 2 DM WITH CKD STAGE 2 AND HYPERTENSION: Primary | ICD-10-CM

## 2022-03-03 LAB
EST. AVERAGE GLUCOSE BLD GHB EST-MCNC: 100 MG/DL
HBA1C MFR BLD HPLC: 5.6 % (ref 4.5–6.6)

## 2022-03-04 LAB — UA COMPLETE W REFLEX CULTURE PNL UR: NORMAL

## 2022-03-07 NOTE — PROGRESS NOTES
Pt's daughter made aware, She is fine with changing the BP med, does not think her mom needs a diuretic at this time.  She will start the potassium, Her mom has not been taking it. She will start Vt D3 as well.

## 2022-03-09 DIAGNOSIS — I10 PRIMARY HYPERTENSION: Primary | ICD-10-CM

## 2022-03-09 RX ORDER — AMLODIPINE BESYLATE 5 MG/1
5 TABLET ORAL NIGHTLY
Qty: 90 TABLET | Refills: 3 | Status: SHIPPED | OUTPATIENT
Start: 2022-03-09 | End: 2022-07-07 | Stop reason: SDUPTHER

## 2022-04-15 DIAGNOSIS — F01.518 VASCULAR DEMENTIA WITH BEHAVIOR DISTURBANCE: Chronic | ICD-10-CM

## 2022-04-18 RX ORDER — MEMANTINE HYDROCHLORIDE 10 MG/1
TABLET ORAL
Qty: 180 TABLET | Refills: 3 | Status: SHIPPED | OUTPATIENT
Start: 2022-04-18 | End: 2023-02-21

## 2022-04-18 RX ORDER — DONEPEZIL HYDROCHLORIDE 10 MG/1
TABLET, FILM COATED ORAL
Qty: 90 TABLET | Refills: 3 | Status: SHIPPED | OUTPATIENT
Start: 2022-04-18 | End: 2023-02-27

## 2022-05-03 ENCOUNTER — HOSPITAL ENCOUNTER (OUTPATIENT)
Dept: RADIOLOGY | Facility: HOSPITAL | Age: 77
Discharge: HOME | End: 2022-05-03
Attending: FAMILY MEDICINE
Payer: MEDICARE

## 2022-05-03 DIAGNOSIS — Z78.0 MENOPAUSE: ICD-10-CM

## 2022-05-03 PROCEDURE — 77080 DEXA BONE DENSITY SPINE HIP: ICD-10-PCS | Mod: 26,,, | Performed by: RADIOLOGY

## 2022-05-03 PROCEDURE — 77080 DXA BONE DENSITY AXIAL: CPT | Mod: TC

## 2022-05-03 PROCEDURE — 77080 DXA BONE DENSITY AXIAL: CPT | Mod: 26,,, | Performed by: RADIOLOGY

## 2022-07-07 DIAGNOSIS — F32.A DEPRESSION, UNSPECIFIED DEPRESSION TYPE: Chronic | ICD-10-CM

## 2022-07-07 DIAGNOSIS — I10 PRIMARY HYPERTENSION: ICD-10-CM

## 2022-07-07 RX ORDER — CITALOPRAM 40 MG/1
40 TABLET, FILM COATED ORAL DAILY
Qty: 90 TABLET | Refills: 3 | Status: SHIPPED | OUTPATIENT
Start: 2022-07-07 | End: 2023-05-15

## 2022-07-07 RX ORDER — AMLODIPINE BESYLATE 5 MG/1
5 TABLET ORAL NIGHTLY
Qty: 90 TABLET | Refills: 3 | Status: SHIPPED | OUTPATIENT
Start: 2022-07-07 | End: 2023-06-15

## 2022-07-11 DIAGNOSIS — E78.2 MIXED HYPERLIPIDEMIA: ICD-10-CM

## 2022-07-11 RX ORDER — ATORVASTATIN CALCIUM 40 MG/1
40 TABLET, FILM COATED ORAL DAILY
Qty: 90 TABLET | Refills: 3 | Status: SHIPPED | OUTPATIENT
Start: 2022-07-11 | End: 2023-05-16

## 2022-08-07 ENCOUNTER — HOSPITAL ENCOUNTER (EMERGENCY)
Facility: HOSPITAL | Age: 77
Discharge: HOME | End: 2022-08-07
Attending: EMERGENCY MEDICINE
Payer: MEDICARE

## 2022-08-07 VITALS
HEART RATE: 53 BPM | WEIGHT: 126 LBS | BODY MASS INDEX: 20.25 KG/M2 | SYSTOLIC BLOOD PRESSURE: 133 MMHG | OXYGEN SATURATION: 99 % | DIASTOLIC BLOOD PRESSURE: 64 MMHG | RESPIRATION RATE: 17 BRPM | TEMPERATURE: 98 F | HEIGHT: 66 IN

## 2022-08-07 DIAGNOSIS — R06.02 SOB (SHORTNESS OF BREATH): ICD-10-CM

## 2022-08-07 DIAGNOSIS — R06.02 SHORTNESS OF BREATH: ICD-10-CM

## 2022-08-07 DIAGNOSIS — F41.9 ANXIETY: Primary | ICD-10-CM

## 2022-08-07 DIAGNOSIS — R20.2 PARESTHESIAS: ICD-10-CM

## 2022-08-07 LAB
ALBUMIN SERPL BCP-MCNC: 4.3 G/DL (ref 3.5–5)
ALBUMIN/GLOB SERPL: 1.4 {RATIO}
ALP SERPL-CCNC: 177 U/L (ref 55–142)
ALT SERPL W P-5'-P-CCNC: 39 U/L (ref 13–56)
ANION GAP SERPL CALCULATED.3IONS-SCNC: 17 MMOL/L (ref 7–16)
AST SERPL W P-5'-P-CCNC: 22 U/L (ref 15–37)
BASOPHILS # BLD AUTO: 0.04 K/UL (ref 0–0.2)
BASOPHILS NFR BLD AUTO: 0.6 % (ref 0–1)
BILIRUB SERPL-MCNC: 0.5 MG/DL (ref 0–1.2)
BILIRUB UR QL STRIP: NEGATIVE
BUN SERPL-MCNC: 11 MG/DL (ref 7–18)
BUN/CREAT SERPL: 10 (ref 6–20)
CALCIUM SERPL-MCNC: 9.2 MG/DL (ref 8.5–10.1)
CHLORIDE SERPL-SCNC: 107 MMOL/L (ref 98–107)
CLARITY UR: CLEAR
CO2 SERPL-SCNC: 24 MMOL/L (ref 21–32)
COLOR UR: YELLOW
CREAT SERPL-MCNC: 1.06 MG/DL (ref 0.55–1.02)
DIFFERENTIAL METHOD BLD: ABNORMAL
EOSINOPHIL # BLD AUTO: 0.17 K/UL (ref 0–0.5)
EOSINOPHIL NFR BLD AUTO: 2.6 % (ref 1–4)
ERYTHROCYTE [DISTWIDTH] IN BLOOD BY AUTOMATED COUNT: 11.8 % (ref 11.5–14.5)
GLOBULIN SER-MCNC: 3 G/DL (ref 2–4)
GLUCOSE SERPL-MCNC: 106 MG/DL (ref 74–106)
GLUCOSE UR STRIP-MCNC: NORMAL MG/DL
HCT VFR BLD AUTO: 40.1 % (ref 38–47)
HGB BLD-MCNC: 13.9 G/DL (ref 12–16)
IMM GRANULOCYTES # BLD AUTO: 0.02 K/UL (ref 0–0.04)
IMM GRANULOCYTES NFR BLD: 0.3 % (ref 0–0.4)
KETONES UR STRIP-SCNC: NEGATIVE MG/DL
LEUKOCYTE ESTERASE UR QL STRIP: NEGATIVE
LYMPHOCYTES # BLD AUTO: 1.76 K/UL (ref 1–4.8)
LYMPHOCYTES NFR BLD AUTO: 26.4 % (ref 27–41)
MCH RBC QN AUTO: 29.6 PG (ref 27–31)
MCHC RBC AUTO-ENTMCNC: 34.7 G/DL (ref 32–36)
MCV RBC AUTO: 85.5 FL (ref 80–96)
MONOCYTES # BLD AUTO: 0.42 K/UL (ref 0–0.8)
MONOCYTES NFR BLD AUTO: 6.3 % (ref 2–6)
MPC BLD CALC-MCNC: 9.8 FL (ref 9.4–12.4)
NEUTROPHILS # BLD AUTO: 4.25 K/UL (ref 1.8–7.7)
NEUTROPHILS NFR BLD AUTO: 63.8 % (ref 53–65)
NITRITE UR QL STRIP: NEGATIVE
NRBC # BLD AUTO: 0 X10E3/UL
NRBC, AUTO (.00): 0 %
NT-PROBNP SERPL-MCNC: 249 PG/ML (ref 1–450)
PH UR STRIP: 7.5 PH UNITS
PLATELET # BLD AUTO: 186 K/UL (ref 150–400)
POTASSIUM SERPL-SCNC: 3.3 MMOL/L (ref 3.5–5.1)
PROT SERPL-MCNC: 7.3 G/DL (ref 6.4–8.2)
PROT UR QL STRIP: 10
RBC # BLD AUTO: 4.69 M/UL (ref 4.2–5.4)
RBC # UR STRIP: NEGATIVE /UL
SODIUM SERPL-SCNC: 145 MMOL/L (ref 136–145)
SP GR UR STRIP: 1.02
TROPONIN I SERPL HS-MCNC: 5.4 PG/ML
UROBILINOGEN UR STRIP-ACNC: NORMAL MG/DL
WBC # BLD AUTO: 6.66 K/UL (ref 4.5–11)

## 2022-08-07 PROCEDURE — 99283 PR EMERGENCY DEPT VISIT,LEVEL III: ICD-10-PCS | Mod: ,,, | Performed by: EMERGENCY MEDICINE

## 2022-08-07 PROCEDURE — 81003 URINALYSIS AUTO W/O SCOPE: CPT | Performed by: EMERGENCY MEDICINE

## 2022-08-07 PROCEDURE — 36415 COLL VENOUS BLD VENIPUNCTURE: CPT | Performed by: EMERGENCY MEDICINE

## 2022-08-07 PROCEDURE — 85025 COMPLETE CBC W/AUTO DIFF WBC: CPT | Performed by: EMERGENCY MEDICINE

## 2022-08-07 PROCEDURE — 93005 ELECTROCARDIOGRAM TRACING: CPT

## 2022-08-07 PROCEDURE — 96374 THER/PROPH/DIAG INJ IV PUSH: CPT

## 2022-08-07 PROCEDURE — 83880 ASSAY OF NATRIURETIC PEPTIDE: CPT | Performed by: EMERGENCY MEDICINE

## 2022-08-07 PROCEDURE — 63600175 PHARM REV CODE 636 W HCPCS: Performed by: EMERGENCY MEDICINE

## 2022-08-07 PROCEDURE — 80053 COMPREHEN METABOLIC PANEL: CPT | Performed by: EMERGENCY MEDICINE

## 2022-08-07 PROCEDURE — 99285 EMERGENCY DEPT VISIT HI MDM: CPT | Mod: 25

## 2022-08-07 PROCEDURE — 93010 EKG 12-LEAD: ICD-10-PCS | Mod: ,,, | Performed by: INTERNAL MEDICINE

## 2022-08-07 PROCEDURE — 84484 ASSAY OF TROPONIN QUANT: CPT | Performed by: EMERGENCY MEDICINE

## 2022-08-07 PROCEDURE — 99283 EMERGENCY DEPT VISIT LOW MDM: CPT | Mod: ,,, | Performed by: EMERGENCY MEDICINE

## 2022-08-07 PROCEDURE — 93010 ELECTROCARDIOGRAM REPORT: CPT | Mod: ,,, | Performed by: INTERNAL MEDICINE

## 2022-08-07 RX ORDER — MIDAZOLAM HYDROCHLORIDE 1 MG/ML
2 INJECTION INTRAMUSCULAR; INTRAVENOUS
Status: COMPLETED | OUTPATIENT
Start: 2022-08-07 | End: 2022-08-07

## 2022-08-07 RX ADMIN — MIDAZOLAM 2 MG: 1 INJECTION INTRAMUSCULAR; INTRAVENOUS at 06:08

## 2022-08-07 NOTE — DISCHARGE INSTRUCTIONS
Continue current medications as prescribed.  Return to emergency department for any worsening or further problems.  Follow up in clinic with primary care provider in 2-3 days for recheck.

## 2022-08-07 NOTE — ED PROVIDER NOTES
Encounter Date: 8/7/2022       History     Chief Complaint   Patient presents with    Shortness of Breath    Numbness     Pt c/o left side numbness/ shortness of breath     Patient is a 76-year-old female who presents to the emergency department complaining of shortness of breath, speech difficulty, and some tingling in her left hand which began at about 8:00 p.m. last night.  Patient is very anxious and nervous and is a very poor historian.  Patient has history of dementia.  Patient denies fever, chest pain, vomiting, diarrhea, or urinary symptoms.  No increased pain or swelling in legs.  Patient denies any recent trauma or head injury.  Patient states the speech difficulty has been intermittent as well as the tingling sensation.  Patient states symptoms have mostly resolved now.  Patient does also report a mild headache.        Review of patient's allergies indicates:   Allergen Reactions    Sulfa (sulfonamide antibiotics) Anaphylaxis    Codeine Nausea Only     Past Medical History:   Diagnosis Date    Achrochordon     Anxiety     Aphasia     Bronchospasm     Cervicalgia     Chest pain     Constipation     CVA (cerebral vascular accident)     Depression     Diabetes mellitus, type 2     Dizziness     Dysuria     Encounter for long-term (current) use of other medications     Encounter for screening colonoscopy 01/10/2017    Essential (primary) hypertension     GERD (gastroesophageal reflux disease)     Hiatal hernia     Hypertension HYPERLIPI    Hypokalemia     Insomnia     Low back pain     Memory impairment     Overactive bladder     Shoulder pain     Sinusitis     Sleep related leg cramps     Thoracic back pain     TMJ (dislocation of temporomandibular joint)     Vitamin B 12 deficiency     Vitamin D deficiency      Past Surgical History:   Procedure Laterality Date    BREAST BIOPSY      rt.core biopsy    carotid dopple complete      CHOLECYSTECTOMY      COLONOSCOPY   01/10/2017    repeat in 5 years    ESOPHAGOGASTRODUODENOSCOPY  11/29/2016    HYSTERECTOMY       Family History   Problem Relation Age of Onset    Hypertension Mother     Hyperlipidemia Mother     Heart disease Father     Hypertension Father     Emphysema Father      Social History     Tobacco Use    Smoking status: Never Smoker    Smokeless tobacco: Never Used   Substance Use Topics    Alcohol use: Never    Drug use: Never     Review of Systems   Respiratory: Positive for shortness of breath.    Neurological: Positive for tremors, speech difficulty, numbness and headaches.   Psychiatric/Behavioral: The patient is nervous/anxious.    All other systems reviewed and are negative.      Physical Exam     Initial Vitals [08/07/22 0610]   BP Pulse Resp Temp SpO2   133/80 90 (!) 22 97.8 °F (36.6 °C) 98 %      MAP       --         Physical Exam    Nursing note and vitals reviewed.  Constitutional: She appears well-developed and well-nourished.   HENT:   Head: Normocephalic.   Eyes: Pupils are equal, round, and reactive to light.   Cardiovascular: Normal rate.   Pulmonary/Chest: Breath sounds normal.   Abdominal: Abdomen is soft.   Musculoskeletal:         General: Normal range of motion.     Neurological: She is alert and oriented to person, place, and time. She has normal strength. No cranial nerve deficit.   Patient's speech is normal.  Sensation is grossly normal although she reports sensation feels slightly different between right hand and left hand.  On history patient had reported tingling in her left hand but on examination she reports slight decreased sensation in the right hand.  Patient has a moderate tremor present.   Skin: Skin is warm. Capillary refill takes less than 2 seconds.   Psychiatric:   Patient seems extremely anxious.         Medical Screening Exam   See Full Note    ED Course   Procedures  Labs Reviewed - No data to display       Imaging Results    None          Medications - No data to  display              ED Course as of 08/08/22 1030   Sun Aug 07, 2022   0755 NT-proBNP: 249 [BB]   0755 Troponin I High Sensitivity: 5.4 [BB]   0755 Potassium(!): 3.3 [BB]   0755 Chloride: 107 [BB]   0755 CO2: 24 [BB]   0755 BUN: 11 [BB]   0755 Creatinine(!): 1.06 [BB]   0755 WBC: 6.66 [BB]   0755 Hemoglobin: 13.9 [BB]   0755 Hematocrit: 40.1 [BB]   0755 Platelets: 186 [BB]   0828 Leukocytes, UA: Negative [BB]   0829 NITRITE UA: Negative [BB]   0838 Symptoms all completely resolved after 2 mg of Versed.  Patient feels better and is anxious to go home now. [BB]      ED Course User Index  [BB] Willard Zheng MD          Clinical Impression:   Final diagnoses:  [R06.02] SOB (shortness of breath)                 Willard Zheng MD  08/08/22 1030

## 2022-08-08 ENCOUNTER — TELEPHONE (OUTPATIENT)
Dept: EMERGENCY MEDICINE | Facility: HOSPITAL | Age: 77
End: 2022-08-08
Payer: COMMERCIAL

## 2022-12-06 ENCOUNTER — OFFICE VISIT (OUTPATIENT)
Dept: NEUROLOGY | Facility: CLINIC | Age: 77
End: 2022-12-06
Payer: MEDICARE

## 2022-12-06 VITALS
HEIGHT: 66 IN | RESPIRATION RATE: 18 BRPM | HEART RATE: 72 BPM | DIASTOLIC BLOOD PRESSURE: 72 MMHG | SYSTOLIC BLOOD PRESSURE: 126 MMHG | BODY MASS INDEX: 20.34 KG/M2 | OXYGEN SATURATION: 99 %

## 2022-12-06 DIAGNOSIS — F51.01 PRIMARY INSOMNIA: ICD-10-CM

## 2022-12-06 DIAGNOSIS — F02.818 EARLY ONSET ALZHEIMER'S DISEASE WITH BEHAVIORAL DISTURBANCE: Primary | ICD-10-CM

## 2022-12-06 DIAGNOSIS — G30.0 EARLY ONSET ALZHEIMER'S DISEASE WITH BEHAVIORAL DISTURBANCE: Primary | ICD-10-CM

## 2022-12-06 DIAGNOSIS — F33.0 MILD EPISODE OF RECURRENT MAJOR DEPRESSIVE DISORDER: ICD-10-CM

## 2022-12-06 PROCEDURE — 99212 PR OFFICE/OUTPT VISIT, EST, LEVL II, 10-19 MIN: ICD-10-PCS | Mod: S$PBB,,, | Performed by: NURSE PRACTITIONER

## 2022-12-06 PROCEDURE — 99212 OFFICE O/P EST SF 10 MIN: CPT | Mod: S$PBB,,, | Performed by: NURSE PRACTITIONER

## 2022-12-06 PROCEDURE — 99214 OFFICE O/P EST MOD 30 MIN: CPT | Mod: PBBFAC | Performed by: NURSE PRACTITIONER

## 2022-12-06 NOTE — PROGRESS NOTES
Subjective:       Patient ID: Jane Clark is a 77 y.o. female     Chief Complaint:    Chief Complaint   Patient presents with    Follow-up    Memory Loss          Allergies:  Sulfa (sulfonamide antibiotics) and Codeine    Current Medications:    Outpatient Encounter Medications as of 12/6/2022   Medication Sig Dispense Refill    amLODIPine (NORVASC) 5 MG tablet Take 1 tablet (5 mg total) by mouth every evening. 90 tablet 3    atorvastatin (LIPITOR) 40 MG tablet Take 1 tablet (40 mg total) by mouth once daily. 90 tablet 3    citalopram (CELEXA) 40 MG tablet Take 1 tablet (40 mg total) by mouth once daily. 90 tablet 3    donepeziL (ARICEPT) 10 MG tablet TAKE 1 TABLET EVERY EVENING 90 tablet 3    fluticasone propionate (FLONASE) 50 mcg/actuation nasal spray 1 spray (50 mcg total) by Each Nostril route once daily. 48 g 3    meclizine (ANTIVERT) 25 mg tablet Take 1 tablet (25 mg total) by mouth 3 (three) times daily as needed for Dizziness or Nausea. 270 tablet 0    memantine (NAMENDA) 10 MG Tab TAKE 1 TABLET TWICE A  tablet 3    polyethylene glycol (GLYCOLAX) 17 gram PwPk Take by mouth. 1 capful by mouth every day with 8 oz of liquid      traZODone (DESYREL) 50 MG tablet Take 3 tablets (150 mg total) by mouth every evening. 270 tablet 3     No facility-administered encounter medications on file as of 12/6/2022.       History of Present Illness  76 y/o WF followed in clinic for likely alzheimer's dementia, insomnia, and CVA.  It has been 18 months since last clinic visit.    Her daughter and son are present with her in clinic.    Last MMSE in clinic 13/30    Dr. Gregg prior testing reflected dementia, more mixed etiology but was not able to r/o possibly Alzheimer's.  She is prescribed aricept and namenda , however there has continued to be gradually worsening in impairment as is typical of this diagnosis.  She is living with family.  Patient admits to noting difficulty with repeating conversations and  questions, family with her certainly agree this continues to be issue.  They report she is sleeping good at night, her primary care prescribes her trazodone 150mg at night, though they report usually she is only taking 100mg.  She is on aricept 10mg daily and namenda 10mg bid.  I would like to try to maximize her potential benefit by seeing if her insurance will approve for Namzaric, or at least the Namenda XR 28mg, so I am submitting for PA.    Prior sleep study evaluation was negative.    She is on triple therpay and tolerating well.  Denies any CVA symptoms since last visit.         Review of Systems  Review of Systems   Constitutional:  Negative for diaphoresis and fever.   HENT:  Negative for congestion, hearing loss and tinnitus.    Eyes:  Negative for blurred vision, double vision, photophobia, discharge and redness.   Respiratory:  Negative for cough and shortness of breath.    Cardiovascular:  Negative for chest pain.   Gastrointestinal:  Negative for abdominal pain, nausea and vomiting.   Musculoskeletal:  Negative for back pain, joint pain, myalgias and neck pain.   Skin:  Negative for itching and rash.   Neurological:  Negative for dizziness, tremors, sensory change, speech change, focal weakness, seizures, loss of consciousness, weakness and headaches.   Psychiatric/Behavioral:  Positive for depression and memory loss. Negative for hallucinations. The patient has insomnia.    All other systems reviewed and are negative.   Objective:     NEUROLOGICAL EXAMINATION:     MENTAL STATUS   Oriented to person, place, and time.   Attention: normal. Concentration: decreased.   Speech: speech is normal   Level of consciousness: alert  Knowledge: good and consistent with education.   Normal comprehension.     CRANIAL NERVES     CN II   Visual fields full to confrontation.   Visual acuity: normal  Right visual field deficit: none  Left visual field deficit: none     CN III, IV, VI   Pupils are equal, round, and  reactive to light.  Extraocular motions are normal.   Right pupil: Size: 3 mm. Shape: regular. Reactivity: brisk. Consensual response: intact. Accommodation: intact.   Left pupil: Size: 3 mm. Shape: regular. Reactivity: brisk. Consensual response: intact. Accommodation: intact.   CN III: no CN III palsy  CN VI: no CN VI palsy  Nystagmus: none   Diplopia: none  Upgaze: normal  Downgaze: normal  Conjugate gaze: present  Vestibulo-ocular reflex: present    CN V   Facial sensation intact.   Right facial sensation deficit: none  Left facial sensation deficit: none  Right corneal reflex: normal  Left corneal reflex: normal    CN VII   Facial expression full, symmetric.   Right facial weakness: none  Left facial weakness: none  Right taste: normal  Left taste: normal    CN VIII   CN VIII normal.   Hearing: intact    CN IX, X   CN IX normal.   CN X normal.   Palate: symmetric    CN XI   CN XI normal.   Right sternocleidomastoid strength: normal  Left sternocleidomastoid strength: normal  Right trapezius strength: normal  Left trapezius strength: normal    CN XII   CN XII normal.   Tongue: not atrophic  Fasciculations: absent  Tongue deviation: none    MOTOR EXAM   Muscle bulk: normal  Overall muscle tone: normal  Right arm tone: normal  Left arm tone: normal  Right arm pronator drift: absent  Left arm pronator drift: absent  Right leg tone: normal  Left leg tone: normal    Strength   Right neck flexion: 5/5  Left neck flexion: 5/5  Right neck extension: 5/5  Left neck extension: 5/5  Right deltoid: 5/5  Left deltoid: 5/5  Right biceps: 5/5  Left biceps: 5/5  Right triceps: 5/5  Left triceps: 5/5  Right wrist flexion: 5/5  Left wrist flexion: 5/5  Right wrist extension: 5/5  Left wrist extension: 5/5  Right interossei: 5/5  Left interossei: 5/5  Right iliopsoas: 5/5  Left iliopsoas: 5/5  Right quadriceps: 5/5  Left quadriceps: 5/5  Right hamstrin/5  Left hamstrin/5  Right anterior tibial: 5/5  Left anterior tibial:  5/5  Right posterior tibial: 5/5  Left posterior tibial: 5/5  Right gastroc: 5/5  Left gastroc: 5/5    REFLEXES     Reflexes   Right brachioradialis: 2+  Left brachioradialis: 2+  Right biceps: 2+  Left biceps: 2+  Right triceps: 2+  Left triceps: 2+  Right patellar: 2+  Left patellar: 2+  Right achilles: 2+  Left achilles: 2+  Right plantar: normal  Left plantar: normal  Right Vásquez: absent  Left Vásquez: absent  Right ankle clonus: absent  Left ankle clonus: absent  Right pendular knee jerk: absent  Left pendular knee jerk: absent    GAIT AND COORDINATION     Gait  Gait: normal     Coordination   Finger to nose coordination: normal  Heel to shin coordination: normal  Tandem walking coordination: normal    Tremor   Resting tremor: absent  Intention tremor: absent  Action tremor: absent     Physical Exam  Vitals and nursing note reviewed.   Constitutional:       Appearance: Normal appearance.   HENT:      Head: Normocephalic.   Eyes:      Extraocular Movements: Extraocular movements intact and EOM normal.      Pupils: Pupils are equal, round, and reactive to light.   Cardiovascular:      Rate and Rhythm: Normal rate and regular rhythm.   Pulmonary:      Effort: Pulmonary effort is normal.      Breath sounds: Normal breath sounds.   Musculoskeletal:         General: No swelling or tenderness. Normal range of motion.      Cervical back: Normal range of motion and neck supple.      Right lower leg: No edema.      Left lower leg: No edema.   Skin:     General: Skin is warm and dry.      Coloration: Skin is not jaundiced.      Findings: No rash.   Neurological:      General: No focal deficit present.      Mental Status: She is alert and oriented to person, place, and time.      GCS: GCS eye subscore is 4. GCS verbal subscore is 5. GCS motor subscore is 6.      Cranial Nerves: No cranial nerve deficit.      Sensory: No sensory deficit.      Motor: Motor function is intact. No weakness.      Coordination: Coordination is  intact. Coordination normal. Finger-Nose-Finger Test and Heel to Shin Test normal.      Gait: Gait is intact. Gait and tandem walk normal.      Deep Tendon Reflexes: Reflexes normal.      Reflex Scores:       Tricep reflexes are 2+ on the right side and 2+ on the left side.       Bicep reflexes are 2+ on the right side and 2+ on the left side.       Brachioradialis reflexes are 2+ on the right side and 2+ on the left side.       Patellar reflexes are 2+ on the right side and 2+ on the left side.       Achilles reflexes are 2+ on the right side and 2+ on the left side.  Psychiatric:         Attention and Perception: She is inattentive.         Mood and Affect: Mood normal.         Speech: Speech normal.         Behavior: Behavior normal. Behavior is cooperative.         Thought Content: Thought content is not paranoid or delusional.         Cognition and Memory: Cognition is impaired. Memory is impaired.        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    There are no discontinued medications.      Requested Prescriptions      No prescriptions requested or ordered in this encounter       No orders of the defined types were placed in this encounter.

## 2022-12-06 NOTE — PATIENT INSTRUCTIONS
1. Try to see if Namzaric can be approved to replace the aricept and namenda  2. Continue to encourage good eating and sleep habits  3. Continue current triple therapy for stroke prevention    Stroke risk modifiers:    1. Good sleep habits, recommend at least 7 hours total sleep daily  2. Continue management of blood pressure and cholesterol including medications, exercise, and good eating habits  3. Exercise as much as possible, recommend 5 days of the week for 30 minutes each day  4. Avoid smoking and alcohol  5. Go to the nearest emergency department immediately if any new or worsening weakness, speech difficulty, or numbness

## 2022-12-22 RX ORDER — OMEPRAZOLE 40 MG/1
40 CAPSULE, DELAYED RELEASE ORAL EVERY MORNING
Qty: 90 CAPSULE | Refills: 3 | Status: SHIPPED | OUTPATIENT
Start: 2022-12-22 | End: 2023-11-21

## 2022-12-22 RX ORDER — DOCUSATE SODIUM 100 MG/1
100 CAPSULE, LIQUID FILLED ORAL 2 TIMES DAILY
Qty: 180 CAPSULE | Refills: 3 | Status: SHIPPED | OUTPATIENT
Start: 2022-12-22 | End: 2023-03-22

## 2022-12-22 RX ORDER — OMEPRAZOLE 40 MG/1
40 CAPSULE, DELAYED RELEASE ORAL EVERY MORNING
COMMUNITY
End: 2022-12-22 | Stop reason: SDUPTHER

## 2022-12-22 RX ORDER — DOCUSATE SODIUM 100 MG/1
100 CAPSULE, LIQUID FILLED ORAL 2 TIMES DAILY
COMMUNITY
End: 2022-12-22 | Stop reason: SDUPTHER

## 2022-12-22 NOTE — TELEPHONE ENCOUNTER
Pt's daughter stated pt is having issues with reflux and asked if you will send in Maven to mail order as well as colace 100 mg PO BID.

## 2022-12-30 ENCOUNTER — TELEPHONE (OUTPATIENT)
Dept: FAMILY MEDICINE | Facility: CLINIC | Age: 77
End: 2022-12-30
Payer: COMMERCIAL

## 2022-12-30 DIAGNOSIS — J06.9 UPPER RESPIRATORY TRACT INFECTION, UNSPECIFIED TYPE: Primary | ICD-10-CM

## 2022-12-30 RX ORDER — METHYLPREDNISOLONE 4 MG/1
TABLET ORAL
Qty: 1 EACH | Refills: 0 | Status: SHIPPED | OUTPATIENT
Start: 2022-12-30 | End: 2023-11-21

## 2022-12-30 RX ORDER — AZITHROMYCIN 250 MG/1
TABLET, FILM COATED ORAL
Qty: 6 TABLET | Refills: 0 | Status: SHIPPED | OUTPATIENT
Start: 2022-12-30 | End: 2023-11-21

## 2022-12-30 NOTE — TELEPHONE ENCOUNTER
Pt's daughter called stated she is positive for Covid and needs meds sent in to the phar in [place].  Asjed for steroids and antibiotic    Niacinamide Counseling: I recommended taking niacin or niacinamide, also know as vitamin B3, twice daily. Recent evidence suggests that taking vitamin B3 (500 mg twice daily) can reduce the risk of actinic keratoses and non-melanoma skin cancers. Side effects of vitamin B3 include flushing and headache.

## 2023-02-20 DIAGNOSIS — F01.518 VASCULAR DEMENTIA WITH BEHAVIOR DISTURBANCE: Chronic | ICD-10-CM

## 2023-02-21 RX ORDER — MEMANTINE HYDROCHLORIDE 10 MG/1
TABLET ORAL
Qty: 180 TABLET | Refills: 3 | Status: SHIPPED | OUTPATIENT
Start: 2023-02-21

## 2023-02-24 DIAGNOSIS — F01.518 VASCULAR DEMENTIA WITH BEHAVIOR DISTURBANCE: Chronic | ICD-10-CM

## 2023-02-27 RX ORDER — DONEPEZIL HYDROCHLORIDE 10 MG/1
TABLET, FILM COATED ORAL
Qty: 90 TABLET | Refills: 3 | Status: SHIPPED | OUTPATIENT
Start: 2023-02-27

## 2023-03-02 ENCOUNTER — HOSPITAL ENCOUNTER (EMERGENCY)
Facility: HOSPITAL | Age: 78
Discharge: HOME | End: 2023-03-02
Attending: FAMILY MEDICINE
Payer: MEDICARE

## 2023-03-02 VITALS
DIASTOLIC BLOOD PRESSURE: 81 MMHG | TEMPERATURE: 98 F | HEART RATE: 54 BPM | BODY MASS INDEX: 20.89 KG/M2 | SYSTOLIC BLOOD PRESSURE: 130 MMHG | RESPIRATION RATE: 18 BRPM | HEIGHT: 66 IN | WEIGHT: 130 LBS | OXYGEN SATURATION: 100 %

## 2023-03-02 DIAGNOSIS — K29.00 ACUTE SUPERFICIAL GASTRITIS WITHOUT HEMORRHAGE: Primary | ICD-10-CM

## 2023-03-02 DIAGNOSIS — R07.9 CHEST PAIN: ICD-10-CM

## 2023-03-02 LAB
ALBUMIN SERPL BCP-MCNC: 3.7 G/DL (ref 3.5–5)
ALBUMIN/GLOB SERPL: 1 {RATIO}
ALP SERPL-CCNC: 197 U/L (ref 55–142)
ALT SERPL W P-5'-P-CCNC: 33 U/L (ref 13–56)
ANION GAP SERPL CALCULATED.3IONS-SCNC: 12 MMOL/L (ref 7–16)
AST SERPL W P-5'-P-CCNC: 24 U/L (ref 15–37)
BASOPHILS # BLD AUTO: 0.06 K/UL (ref 0–0.2)
BASOPHILS NFR BLD AUTO: 1.2 % (ref 0–1)
BILIRUB SERPL-MCNC: 0.4 MG/DL (ref ?–1.2)
BUN SERPL-MCNC: 12 MG/DL (ref 7–18)
BUN/CREAT SERPL: 11 (ref 6–20)
CALCIUM SERPL-MCNC: 8.8 MG/DL (ref 8.5–10.1)
CHLORIDE SERPL-SCNC: 102 MMOL/L (ref 98–107)
CO2 SERPL-SCNC: 27 MMOL/L (ref 21–32)
CREAT SERPL-MCNC: 1.09 MG/DL (ref 0.55–1.02)
DIFFERENTIAL METHOD BLD: ABNORMAL
EGFR (NO RACE VARIABLE) (RUSH/TITUS): 53 ML/MIN/1.73M²
EOSINOPHIL # BLD AUTO: 0.13 K/UL (ref 0–0.5)
EOSINOPHIL NFR BLD AUTO: 2.6 % (ref 1–4)
ERYTHROCYTE [DISTWIDTH] IN BLOOD BY AUTOMATED COUNT: 12.4 % (ref 11.5–14.5)
GLOBULIN SER-MCNC: 3.6 G/DL (ref 2–4)
GLUCOSE SERPL-MCNC: 85 MG/DL (ref 74–106)
HCT VFR BLD AUTO: 38.6 % (ref 38–47)
HGB BLD-MCNC: 12.9 G/DL (ref 12–16)
IMM GRANULOCYTES # BLD AUTO: 0.01 K/UL (ref 0–0.04)
IMM GRANULOCYTES NFR BLD: 0.2 % (ref 0–0.4)
LYMPHOCYTES # BLD AUTO: 1.88 K/UL (ref 1–4.8)
LYMPHOCYTES NFR BLD AUTO: 37.5 % (ref 27–41)
MCH RBC QN AUTO: 28.5 PG (ref 27–31)
MCHC RBC AUTO-ENTMCNC: 33.4 G/DL (ref 32–36)
MCV RBC AUTO: 85.2 FL (ref 80–96)
MONOCYTES # BLD AUTO: 0.31 K/UL (ref 0–0.8)
MONOCYTES NFR BLD AUTO: 6.2 % (ref 2–6)
MPC BLD CALC-MCNC: 10.2 FL (ref 9.4–12.4)
NEUTROPHILS # BLD AUTO: 2.62 K/UL (ref 1.8–7.7)
NEUTROPHILS NFR BLD AUTO: 52.3 % (ref 53–65)
NRBC # BLD AUTO: 0 X10E3/UL
NRBC, AUTO (.00): 0 %
PLATELET # BLD AUTO: 182 K/UL (ref 150–400)
POTASSIUM SERPL-SCNC: 3.2 MMOL/L (ref 3.5–5.1)
PROT SERPL-MCNC: 7.3 G/DL (ref 6.4–8.2)
RBC # BLD AUTO: 4.53 M/UL (ref 4.2–5.4)
SODIUM SERPL-SCNC: 138 MMOL/L (ref 136–145)
TROPONIN I SERPL HS-MCNC: 5.9 PG/ML
WBC # BLD AUTO: 5.01 K/UL (ref 4.5–11)

## 2023-03-02 PROCEDURE — 80053 COMPREHEN METABOLIC PANEL: CPT | Performed by: FAMILY MEDICINE

## 2023-03-02 PROCEDURE — 93005 ELECTROCARDIOGRAM TRACING: CPT

## 2023-03-02 PROCEDURE — 99284 PR EMERGENCY DEPT VISIT,LEVEL IV: ICD-10-PCS | Mod: ,,, | Performed by: FAMILY MEDICINE

## 2023-03-02 PROCEDURE — 84484 ASSAY OF TROPONIN QUANT: CPT | Performed by: FAMILY MEDICINE

## 2023-03-02 PROCEDURE — 93010 EKG 12-LEAD: ICD-10-PCS | Mod: ,,, | Performed by: HOSPITALIST

## 2023-03-02 PROCEDURE — 85025 COMPLETE CBC W/AUTO DIFF WBC: CPT | Performed by: FAMILY MEDICINE

## 2023-03-02 PROCEDURE — 99284 EMERGENCY DEPT VISIT MOD MDM: CPT

## 2023-03-02 PROCEDURE — 25000003 PHARM REV CODE 250: Performed by: FAMILY MEDICINE

## 2023-03-02 PROCEDURE — 93010 ELECTROCARDIOGRAM REPORT: CPT | Mod: ,,, | Performed by: HOSPITALIST

## 2023-03-02 PROCEDURE — 99284 EMERGENCY DEPT VISIT MOD MDM: CPT | Mod: ,,, | Performed by: FAMILY MEDICINE

## 2023-03-02 RX ORDER — ESOMEPRAZOLE MAGNESIUM 40 MG/1
40 CAPSULE, DELAYED RELEASE ORAL
Qty: 40 CAPSULE | Refills: 5 | Status: SHIPPED | OUTPATIENT
Start: 2023-03-02 | End: 2023-10-31 | Stop reason: SDUPTHER

## 2023-03-02 RX ORDER — MAG HYDROX/ALUMINUM HYD/SIMETH 200-200-20
30 SUSPENSION, ORAL (FINAL DOSE FORM) ORAL
Status: COMPLETED | OUTPATIENT
Start: 2023-03-02 | End: 2023-03-02

## 2023-03-02 RX ORDER — LIDOCAINE HYDROCHLORIDE 20 MG/ML
15 SOLUTION OROPHARYNGEAL
Status: COMPLETED | OUTPATIENT
Start: 2023-03-02 | End: 2023-03-02

## 2023-03-02 RX ADMIN — LIDOCAINE HYDROCHLORIDE 15 ML: 20 SOLUTION ORAL; TOPICAL at 04:03

## 2023-03-02 RX ADMIN — ALUMINUM HYDROXIDE, MAGNESIUM HYDROXIDE, AND SIMETHICONE 30 ML: 200; 200; 20 SUSPENSION ORAL at 04:03

## 2023-03-02 NOTE — ED PROVIDER NOTES
Encounter Date: 3/2/2023       History     Chief Complaint   Patient presents with    Chest Pain     CP that has been off/on since Cesario 4/10 pain      Patient comes in with chest pain off and on since Sunday.  Also with epigastric pain to Advil given prior to arrival to the ER for further evaluation.  No nausea vomiting no shortness of breath      Review of patient's allergies indicates:   Allergen Reactions    Sulfa (sulfonamide antibiotics) Anaphylaxis    Codeine Nausea Only     Past Medical History:   Diagnosis Date    Achrochordon     Anxiety     Aphasia     Bronchospasm     Cervicalgia     Chest pain     Constipation     CVA (cerebral vascular accident)     Depression     Diabetes mellitus, type 2     Dizziness     Dysuria     Encounter for long-term (current) use of other medications     Encounter for screening colonoscopy 01/10/2017    Essential (primary) hypertension     GERD (gastroesophageal reflux disease)     Hiatal hernia     Hypertension HYPERLIPI    Hypokalemia     Insomnia     Low back pain     Memory impairment     Overactive bladder     Shoulder pain     Sinusitis     Sleep related leg cramps     Thoracic back pain     TMJ (dislocation of temporomandibular joint)     Vitamin B 12 deficiency     Vitamin D deficiency      Past Surgical History:   Procedure Laterality Date    BREAST BIOPSY      rt.core biopsy    carotid dopple complete      CHOLECYSTECTOMY      COLONOSCOPY  01/10/2017    repeat in 5 years    ESOPHAGOGASTRODUODENOSCOPY  11/29/2016    HYSTERECTOMY       Family History   Problem Relation Age of Onset    Hypertension Mother     Hyperlipidemia Mother     Heart disease Father     Hypertension Father     Emphysema Father      Social History     Tobacco Use    Smoking status: Never    Smokeless tobacco: Never   Substance Use Topics    Alcohol use: Never    Drug use: Never     Review of Systems   Constitutional: Negative.  Negative for fever.   HENT: Negative.  Negative for sore throat.     Eyes: Negative.    Respiratory:  Positive for chest tightness. Negative for shortness of breath.    Cardiovascular: Negative.  Negative for chest pain.   Gastrointestinal: Negative.  Negative for nausea.   Endocrine: Negative.    Genitourinary: Negative.  Negative for dysuria.   Musculoskeletal: Negative.  Negative for back pain.   Skin: Negative.  Negative for rash.   Allergic/Immunologic: Negative.    Neurological: Negative.  Negative for weakness.   Hematological: Negative.  Does not bruise/bleed easily.   Psychiatric/Behavioral: Negative.     All other systems reviewed and are negative.    Physical Exam     Initial Vitals [03/02/23 1559]   BP Pulse Resp Temp SpO2   130/81 (!) 54 18 97.8 °F (36.6 °C) 100 %      MAP       --         Physical Exam    Constitutional: She appears well-developed and well-nourished.   HENT:   Head: Normocephalic and atraumatic.   Right Ear: External ear normal.   Left Ear: External ear normal.   Nose: Nose normal.   Mouth/Throat: Oropharynx is clear and moist.   Eyes: Conjunctivae and EOM are normal. Pupils are equal, round, and reactive to light.   Neck: Neck supple.   Normal range of motion.  Cardiovascular:  Normal rate, regular rhythm, normal heart sounds and intact distal pulses.           Pulmonary/Chest: Breath sounds normal.   Abdominal: Abdomen is soft. Bowel sounds are normal.   Genitourinary:    Vagina and uterus normal.     Musculoskeletal:         General: Normal range of motion.      Cervical back: Normal range of motion and neck supple.     Neurological: She is alert and oriented to person, place, and time. She has normal strength and normal reflexes.   Skin: Skin is warm. Capillary refill takes less than 2 seconds.   Psychiatric: She has a normal mood and affect. Her behavior is normal. Judgment and thought content normal.       Medical Screening Exam   See Full Note    ED Course   Procedures  Labs Reviewed   COMPREHENSIVE METABOLIC PANEL - Abnormal; Notable for the  following components:       Result Value    Potassium 3.2 (*)     Creatinine 1.09 (*)     Alk Phos 197 (*)     eGFR 53 (*)     All other components within normal limits   CBC WITH DIFFERENTIAL - Abnormal; Notable for the following components:    Neutrophils % 52.3 (*)     Monocytes % 6.2 (*)     Basophils % 1.2 (*)     All other components within normal limits   TROPONIN I - Normal   CBC W/ AUTO DIFFERENTIAL    Narrative:     The following orders were created for panel order CBC Auto Differential.  Procedure                               Abnormality         Status                     ---------                               -----------         ------                     CBC with Differential[[id number]]        Abnormal            Final result                 Please view results for these tests on the individual orders.          Imaging Results    None          Medications   aluminum-magnesium hydroxide-simethicone 200-200-20 mg/5 mL suspension 30 mL (30 mLs Oral Given 3/2/23 1644)   LIDOcaine HCl 2% oral solution 15 mL (15 mLs Oral Given 3/2/23 1644)     Medical Decision Making:   Initial Assessment:   As mentioned before the patient comes in with chest pain with epigastric pain.  Differential Diagnosis:   1.Gastritis 2.  Esophagitis.  Clinical Tests:   Lab Tests: Ordered and Reviewed       <> Summary of Lab: All labs look good  Medical Tests: Ordered and Reviewed  ED Management:  EKG normal sinus rhythm no ischemic change                 Clinical Impression:   Final diagnoses:  [R07.9] Chest pain  [K29.00] Acute superficial gastritis without hemorrhage (Primary)        ED Disposition Condition    Discharge Stable          ED Prescriptions       Medication Sig Dispense Start Date End Date Auth. Provider    esomeprazole (NEXIUM) 40 MG capsule Take 1 capsule (40 mg total) by mouth before breakfast. 40 capsule 3/2/2023 4/11/2023 Chapito Mar, DO          Follow-up Information    None          Chapito Mar,  DO  03/02/23 1737

## 2023-04-28 ENCOUNTER — TELEPHONE (OUTPATIENT)
Dept: ADMINISTRATIVE | Facility: HOSPITAL | Age: 78
End: 2023-04-28

## 2023-04-28 NOTE — TELEPHONE ENCOUNTER
Annual Visit    Attempted to contact pt to schedule an Annual Exam with PCP. No answer, LVM instructing pt to contact the office at [phone] to schedule Annual Exam.      Pt is due for a yearly office visit.     LOV: 3.2.22

## 2023-05-11 DIAGNOSIS — F32.A DEPRESSION, UNSPECIFIED DEPRESSION TYPE: Chronic | ICD-10-CM

## 2023-05-15 RX ORDER — CITALOPRAM 40 MG/1
TABLET, FILM COATED ORAL
Qty: 90 TABLET | Refills: 3 | Status: SHIPPED | OUTPATIENT
Start: 2023-05-15 | End: 2024-03-28 | Stop reason: SDUPTHER

## 2023-06-14 DIAGNOSIS — I10 PRIMARY HYPERTENSION: ICD-10-CM

## 2023-06-15 RX ORDER — AMLODIPINE BESYLATE 5 MG/1
TABLET ORAL
Qty: 90 TABLET | Refills: 3 | Status: SHIPPED | OUTPATIENT
Start: 2023-06-15 | End: 2024-02-06

## 2023-07-17 ENCOUNTER — TELEPHONE (OUTPATIENT)
Dept: FAMILY MEDICINE | Facility: CLINIC | Age: 78
End: 2023-07-17
Payer: MEDICARE

## 2023-07-17 DIAGNOSIS — N39.0 URINARY TRACT INFECTION WITHOUT HEMATURIA, SITE UNSPECIFIED: Primary | ICD-10-CM

## 2023-07-17 RX ORDER — NITROFURANTOIN 25; 75 MG/1; MG/1
100 CAPSULE ORAL 2 TIMES DAILY
Qty: 20 CAPSULE | Refills: 0 | Status: SHIPPED | OUTPATIENT
Start: 2023-07-17 | End: 2023-07-27

## 2023-07-17 NOTE — TELEPHONE ENCOUNTER
Pt daughter called stated her mom has a UTI, urinary frequency and confusion.  Will you send something in to [facility] in [place].

## 2023-07-20 ENCOUNTER — HOSPITAL ENCOUNTER (EMERGENCY)
Facility: HOSPITAL | Age: 78
Discharge: HOME | End: 2023-07-20
Payer: MEDICARE

## 2023-07-20 VITALS
RESPIRATION RATE: 14 BRPM | WEIGHT: 130 LBS | HEART RATE: 54 BPM | SYSTOLIC BLOOD PRESSURE: 126 MMHG | BODY MASS INDEX: 20.89 KG/M2 | DIASTOLIC BLOOD PRESSURE: 65 MMHG | HEIGHT: 66 IN | OXYGEN SATURATION: 99 % | TEMPERATURE: 98 F

## 2023-07-20 DIAGNOSIS — R07.9 CHEST PAIN: ICD-10-CM

## 2023-07-20 DIAGNOSIS — R30.0 DYSURIA: Primary | ICD-10-CM

## 2023-07-20 DIAGNOSIS — R10.9 ABDOMINAL PAIN: ICD-10-CM

## 2023-07-20 LAB
ALBUMIN SERPL BCP-MCNC: 4 G/DL (ref 3.5–5)
ALBUMIN/GLOB SERPL: 1.2 {RATIO}
ALP SERPL-CCNC: 215 U/L (ref 55–142)
ALT SERPL W P-5'-P-CCNC: 24 U/L (ref 13–56)
ANION GAP SERPL CALCULATED.3IONS-SCNC: 12 MMOL/L (ref 7–16)
AST SERPL W P-5'-P-CCNC: 22 U/L (ref 15–37)
BASOPHILS # BLD AUTO: 0.07 K/UL (ref 0–0.2)
BASOPHILS NFR BLD AUTO: 1 % (ref 0–1)
BILIRUB SERPL-MCNC: 0.7 MG/DL (ref ?–1.2)
BILIRUB UR QL STRIP: NEGATIVE
BUN SERPL-MCNC: 10 MG/DL (ref 7–18)
BUN/CREAT SERPL: 9 (ref 6–20)
CALCIUM SERPL-MCNC: 9.6 MG/DL (ref 8.5–10.1)
CHLORIDE SERPL-SCNC: 103 MMOL/L (ref 98–107)
CLARITY UR: ABNORMAL
CO2 SERPL-SCNC: 28 MMOL/L (ref 21–32)
COLOR UR: YELLOW
CREAT SERPL-MCNC: 1.08 MG/DL (ref 0.55–1.02)
DIFFERENTIAL METHOD BLD: ABNORMAL
EGFR (NO RACE VARIABLE) (RUSH/TITUS): 53 ML/MIN/1.73M2
EOSINOPHIL # BLD AUTO: 0.18 K/UL (ref 0–0.5)
EOSINOPHIL NFR BLD AUTO: 2.6 % (ref 1–4)
ERYTHROCYTE [DISTWIDTH] IN BLOOD BY AUTOMATED COUNT: 11.9 % (ref 11.5–14.5)
GLOBULIN SER-MCNC: 3.3 G/DL (ref 2–4)
GLUCOSE SERPL-MCNC: 90 MG/DL (ref 74–106)
GLUCOSE UR STRIP-MCNC: NORMAL MG/DL
HCT VFR BLD AUTO: 41.6 % (ref 38–47)
HGB BLD-MCNC: 14.2 G/DL (ref 12–16)
IMM GRANULOCYTES # BLD AUTO: 0.01 K/UL (ref 0–0.04)
IMM GRANULOCYTES NFR BLD: 0.1 % (ref 0–0.4)
KETONES UR STRIP-SCNC: NEGATIVE MG/DL
LEUKOCYTE ESTERASE UR QL STRIP: NEGATIVE
LYMPHOCYTES # BLD AUTO: 2.27 K/UL (ref 1–4.8)
LYMPHOCYTES NFR BLD AUTO: 32.9 % (ref 27–41)
MCH RBC QN AUTO: 29.9 PG (ref 27–31)
MCHC RBC AUTO-ENTMCNC: 34.1 G/DL (ref 32–36)
MCV RBC AUTO: 87.6 FL (ref 80–96)
MONOCYTES # BLD AUTO: 0.54 K/UL (ref 0–0.8)
MONOCYTES NFR BLD AUTO: 7.8 % (ref 2–6)
MPC BLD CALC-MCNC: 9.9 FL (ref 9.4–12.4)
NEUTROPHILS # BLD AUTO: 3.82 K/UL (ref 1.8–7.7)
NEUTROPHILS NFR BLD AUTO: 55.6 % (ref 53–65)
NITRITE UR QL STRIP: NEGATIVE
NRBC # BLD AUTO: 0 X10E3/UL
NRBC, AUTO (.00): 0 %
NT-PROBNP SERPL-MCNC: 109 PG/ML (ref 1–450)
PH UR STRIP: 8 PH UNITS
PLATELET # BLD AUTO: 208 K/UL (ref 150–400)
POTASSIUM SERPL-SCNC: 3.5 MMOL/L (ref 3.5–5.1)
PROT SERPL-MCNC: 7.3 G/DL (ref 6.4–8.2)
PROT UR QL STRIP: 20
RBC # BLD AUTO: 4.75 M/UL (ref 4.2–5.4)
RBC # UR STRIP: NEGATIVE /UL
SODIUM SERPL-SCNC: 139 MMOL/L (ref 136–145)
SP GR UR STRIP: 1.02
TROPONIN I SERPL DL<=0.01 NG/ML-MCNC: 4.8 PG/ML
TROPONIN I SERPL DL<=0.01 NG/ML-MCNC: 4.8 PG/ML
UROBILINOGEN UR STRIP-ACNC: NORMAL MG/DL
WBC # BLD AUTO: 6.89 K/UL (ref 4.5–11)

## 2023-07-20 PROCEDURE — 80053 COMPREHEN METABOLIC PANEL: CPT | Performed by: NURSE PRACTITIONER

## 2023-07-20 PROCEDURE — 99285 EMERGENCY DEPT VISIT HI MDM: CPT | Mod: 25

## 2023-07-20 PROCEDURE — 84484 ASSAY OF TROPONIN QUANT: CPT | Mod: 91 | Performed by: NURSE PRACTITIONER

## 2023-07-20 PROCEDURE — 99284 PR EMERGENCY DEPT VISIT,LEVEL IV: ICD-10-PCS | Mod: ,,, | Performed by: NURSE PRACTITIONER

## 2023-07-20 PROCEDURE — 83880 ASSAY OF NATRIURETIC PEPTIDE: CPT | Performed by: NURSE PRACTITIONER

## 2023-07-20 PROCEDURE — 93010 EKG 12-LEAD: ICD-10-PCS | Mod: ,,, | Performed by: STUDENT IN AN ORGANIZED HEALTH CARE EDUCATION/TRAINING PROGRAM

## 2023-07-20 PROCEDURE — 25000003 PHARM REV CODE 250: Performed by: NURSE PRACTITIONER

## 2023-07-20 PROCEDURE — 81003 URINALYSIS AUTO W/O SCOPE: CPT | Performed by: NURSE PRACTITIONER

## 2023-07-20 PROCEDURE — 99284 EMERGENCY DEPT VISIT MOD MDM: CPT | Mod: ,,, | Performed by: NURSE PRACTITIONER

## 2023-07-20 PROCEDURE — 93005 ELECTROCARDIOGRAM TRACING: CPT

## 2023-07-20 PROCEDURE — 93010 ELECTROCARDIOGRAM REPORT: CPT | Mod: ,,, | Performed by: STUDENT IN AN ORGANIZED HEALTH CARE EDUCATION/TRAINING PROGRAM

## 2023-07-20 PROCEDURE — 85025 COMPLETE CBC W/AUTO DIFF WBC: CPT | Performed by: NURSE PRACTITIONER

## 2023-07-20 RX ORDER — ASPIRIN 325 MG
325 TABLET ORAL
Status: DISCONTINUED | OUTPATIENT
Start: 2023-07-20 | End: 2023-07-20 | Stop reason: HOSPADM

## 2023-07-20 NOTE — ED PROVIDER NOTES
Encounter Date: 7/20/2023       History     Chief Complaint   Patient presents with    Chest Pain     Pt c/o chest pain x3 hours. Tender on palpation.      77 year old female presents to ED with complaint of chest pain. Patient reports chest pain for approximately 3 hours ago that is upper epigastric in location. Denies cough, sinus pain, sinus pressure, congestion, fever, chills. Daughter at bedside also reports patient is being treated for UTI; unsure of medication name.    The history is provided by the patient and a relative.   Review of patient's allergies indicates:   Allergen Reactions    Sulfa (sulfonamide antibiotics) Anaphylaxis    Codeine Nausea Only     Past Medical History:   Diagnosis Date    Achrochordon     Anxiety     Aphasia     Bronchospasm     Cervicalgia     Chest pain     Constipation     CVA (cerebral vascular accident)     Depression     Diabetes mellitus, type 2     Dizziness     Dysuria     Encounter for long-term (current) use of other medications     Encounter for screening colonoscopy 01/10/2017    Essential (primary) hypertension     GERD (gastroesophageal reflux disease)     Hiatal hernia     Hypertension HYPERLIPI    Hypokalemia     Insomnia     Low back pain     Memory impairment     Overactive bladder     Shoulder pain     Sinusitis     Sleep related leg cramps     Thoracic back pain     TMJ (dislocation of temporomandibular joint)     Vitamin B 12 deficiency     Vitamin D deficiency      Past Surgical History:   Procedure Laterality Date    BREAST BIOPSY      rt.core biopsy    carotid dopple complete      CHOLECYSTECTOMY      COLONOSCOPY  01/10/2017    repeat in 5 years    ESOPHAGOGASTRODUODENOSCOPY  11/29/2016    HYSTERECTOMY       Family History   Problem Relation Age of Onset    Hypertension Mother     Hyperlipidemia Mother     Heart disease Father     Hypertension Father     Emphysema Father      Social History     Tobacco Use    Smoking status: Never    Smokeless tobacco:  Never   Substance Use Topics    Alcohol use: Never    Drug use: Never     Review of Systems   Constitutional:  Negative for chills, diaphoresis and fever.   HENT:  Negative for congestion, sinus pressure and sinus pain.    Eyes:  Negative for photophobia and visual disturbance.   Respiratory:  Negative for cough and shortness of breath.    Cardiovascular:  Positive for chest pain. Negative for palpitations.   Gastrointestinal:  Positive for abdominal pain. Negative for nausea and vomiting.   Endocrine: Negative for cold intolerance and heat intolerance.   Genitourinary:  Positive for dysuria and frequency.   Musculoskeletal:  Negative for arthralgias and gait problem.   Skin:  Negative for color change and wound.   Neurological:  Negative for dizziness and weakness.   Hematological:  Negative for adenopathy. Does not bruise/bleed easily.   Psychiatric/Behavioral:  Negative for agitation and confusion.    All other systems reviewed and are negative.    Physical Exam     Initial Vitals   BP Pulse Resp Temp SpO2   07/20/23 1430 07/20/23 1430 07/20/23 1430 07/20/23 1452 07/20/23 1430   (!) 152/68 (!) 59 (!) 24 97.9 °F (36.6 °C) 98 %      MAP       --                Physical Exam    Nursing note and vitals reviewed.  Constitutional: She appears well-developed and well-nourished.   HENT:   Head: Normocephalic and atraumatic.   Eyes: EOM are normal. Pupils are equal, round, and reactive to light.   Neck: Neck supple.   Normal range of motion.  Cardiovascular:  Normal rate and regular rhythm.           No murmur heard.  Pulmonary/Chest: She has no wheezes. She has no rhonchi. She exhibits no tenderness.   Abdominal: She exhibits no distension. There is no abdominal tenderness.   Musculoskeletal:         General: No tenderness or edema.      Cervical back: Normal range of motion and neck supple.     Lymphadenopathy:     She has no cervical adenopathy.   Neurological: She is alert and oriented to person, place, and time. No  cranial nerve deficit or sensory deficit.   Skin: Skin is warm and dry. Capillary refill takes less than 2 seconds.   Psychiatric: She has a normal mood and affect. Thought content normal.       Medical Screening Exam   See Full Note    ED Course   Procedures  Labs Reviewed   COMPREHENSIVE METABOLIC PANEL - Abnormal; Notable for the following components:       Result Value    Creatinine 1.08 (*)     Alk Phos 215 (*)     eGFR 53 (*)     All other components within normal limits   URINALYSIS, REFLEX TO URINE CULTURE - Abnormal; Notable for the following components:    Protein, UA 20 (*)     All other components within normal limits   CBC WITH DIFFERENTIAL - Abnormal; Notable for the following components:    Monocytes % 7.8 (*)     All other components within normal limits   TROPONIN I - Normal   NT-PRO NATRIURETIC PEPTIDE - Normal   TROPONIN I - Normal   CBC W/ AUTO DIFFERENTIAL    Narrative:     The following orders were created for panel order CBC auto differential.  Procedure                               Abnormality         Status                     ---------                               -----------         ------                     CBC with Differential[[id number]]        Abnormal            Final result                 Please view results for these tests on the individual orders.   EXTRA TUBES    Narrative:     The following orders were created for panel order EXTRA TUBES.  Procedure                               Abnormality         Status                     ---------                               -----------         ------                     Light Blue Top Hold[[id number]]                              In process                 Red Top Hold[[id number]]                                     In process                   Please view results for these tests on the individual orders.   LIGHT BLUE TOP HOLD   RED TOP HOLD          Imaging Results              X-Ray Abdomen AP 1 View (KUB) (Final result)  Result time  07/20/23 15:32:08      Final result by Galen Dickerson MD (07/20/23 15:32:08)                   Impression:      No acute findings.      Electronically signed by: Galen Dickerson  Date:    07/20/2023  Time:    15:32               Narrative:    EXAMINATION:  XR ABDOMEN AP 1 VIEW    CLINICAL HISTORY:  Unspecified abdominal pain    TECHNIQUE:  AP View(s) of the abdomen was performed.    COMPARISON:  None    FINDINGS:  Nonobstructed bowel-gas pattern.  Surgical clips right upper quadrant.  Degenerative changes throughout the spine and hips.  No abnormal calcifications.                                       X-Ray Chest AP Portable (Final result)  Result time 07/20/23 15:31:34      Final result by Galen Dickerson MD (07/20/23 15:31:34)                   Impression:      No acute findings.      Electronically signed by: Galen Dickerson  Date:    07/20/2023  Time:    15:31               Narrative:    EXAMINATION:  XR CHEST AP PORTABLE    CLINICAL HISTORY:  Chest Pain;    TECHNIQUE:  Single frontal view of the chest was performed.    COMPARISON:  08/07/2022    FINDINGS:  Heart size normal. Lungs clear. No pneumothorax or pleural effusion.                                       Medications   aspirin tablet 325 mg (325 mg Oral Not Given 7/20/23 1519)     Medical Decision Making:   Initial Assessment:   Chest pain  Differential Diagnosis:   MI/NSTEMI  Chest pain  Clinical Tests:   Lab Tests: Ordered and Reviewed  Radiological Study: Ordered and Reviewed  Medical Tests: Ordered and Reviewed  ED Management:  MDM    Patient presents for emergent evaluation of acute chest pain that poses a threat to life and/or bodily function.    In the ED patient found to have acute chest pain.    I ordered labs and personally reviewed them.  Labs significant for creat 1.08, alk phos 215, protein 20  I ordered X-rays and personally reviewed them and reviewed the radiologist interpretation.  Xray significant for no acute processes.    I ordered EKG and personally  reviewed it.  EKG significant for sinus arrhythmia; borderline prolonged QT interval .      Discharge MDM  Patient was managed in the ED with PO ASA (refused).    The response to treatment was good.    Patient was discharged in stable condition.  Detailed return precautions discussed.  Patient reports symptoms resolved and requesting to go home. Labs unremarkable.                        Clinical Impression:   Final diagnoses:  [R07.9] Chest pain  [R10.9] Abdominal pain  [R30.0] Dysuria (Primary)        ED Disposition Condition    Discharge Stable          ED Prescriptions    None       Follow-up Information    None          MASON Pinzon  07/20/23 [id number]

## 2023-08-07 ENCOUNTER — PATIENT OUTREACH (OUTPATIENT)
Dept: ADMINISTRATIVE | Facility: HOSPITAL | Age: 78
End: 2023-08-07

## 2023-08-07 NOTE — PROGRESS NOTES
Patient is scheduled for PCP Visit 08/21/2023.     Health Maintenance Due   Topic Date Due    Hepatitis C Screening  Never done    COVID-19 Vaccine (1) Never done    Shingles Vaccine (1 of 2) Never done    TETANUS VACCINE  11/05/2022

## 2023-08-09 DIAGNOSIS — Z71.89 COMPLEX CARE COORDINATION: ICD-10-CM

## 2023-10-18 ENCOUNTER — PATIENT OUTREACH (OUTPATIENT)
Dept: ADMINISTRATIVE | Facility: HOSPITAL | Age: 78
End: 2023-10-18

## 2023-10-18 NOTE — PROGRESS NOTES
HYPERTENSION PATIENT-NEEDS 2nd B/P READING IF OVER 140/90.  IF 2nd B/P READING IS OVER 140/90, PLEASE MAKE A NURSE VISIT APPOINTMENT OR HYPERTENTION FOLLOW UP APPOINTMENT W/ PT'S PCP WITHIN 30 DAYS. OR BEFORE END OF THE YEAR 2023.

## 2023-10-31 RX ORDER — ESOMEPRAZOLE MAGNESIUM 40 MG/1
40 CAPSULE, DELAYED RELEASE ORAL DAILY
Qty: 90 CAPSULE | Refills: 2 | Status: SHIPPED | OUTPATIENT
Start: 2023-10-31 | End: 2024-01-18 | Stop reason: SDUPTHER

## 2023-11-07 ENCOUNTER — APPOINTMENT (OUTPATIENT)
Dept: RADIOLOGY | Facility: CLINIC | Age: 78
End: 2023-11-07
Attending: FAMILY MEDICINE
Payer: MEDICARE

## 2023-11-07 ENCOUNTER — CLINICAL SUPPORT (OUTPATIENT)
Dept: FAMILY MEDICINE | Facility: CLINIC | Age: 78
End: 2023-11-07
Payer: MEDICARE

## 2023-11-07 DIAGNOSIS — Z11.1 SCREENING-PULMONARY TB: ICD-10-CM

## 2023-11-07 DIAGNOSIS — I10 PRIMARY HYPERTENSION: ICD-10-CM

## 2023-11-07 DIAGNOSIS — I10 PRIMARY HYPERTENSION: Primary | ICD-10-CM

## 2023-11-07 PROCEDURE — 71046 X-RAY EXAM CHEST 2 VIEWS: CPT | Mod: TC,RHCUB | Performed by: FAMILY MEDICINE

## 2023-11-07 PROCEDURE — 71046 X-RAY EXAM CHEST 2 VIEWS: CPT | Mod: 26,,, | Performed by: RADIOLOGY

## 2023-11-07 PROCEDURE — 71046 XR CHEST PA AND LATERAL: ICD-10-PCS | Mod: 26,,, | Performed by: RADIOLOGY

## 2023-11-07 PROCEDURE — 86580 TB INTRADERMAL TEST: CPT | Mod: RHCUB | Performed by: FAMILY MEDICINE

## 2023-11-09 NOTE — PROGRESS NOTES
Pt here with her daughter for CXR and TB skin test as requested by the adult care center she goes to.  Tolerated both well.  The TB skin test will be read at [facility].

## 2023-11-21 ENCOUNTER — OFFICE VISIT (OUTPATIENT)
Dept: FAMILY MEDICINE | Facility: CLINIC | Age: 78
End: 2023-11-21
Payer: MEDICARE

## 2023-11-21 VITALS
HEART RATE: 55 BPM | DIASTOLIC BLOOD PRESSURE: 70 MMHG | TEMPERATURE: 97 F | BODY MASS INDEX: 21.26 KG/M2 | WEIGHT: 132.31 LBS | OXYGEN SATURATION: 97 % | RESPIRATION RATE: 18 BRPM | SYSTOLIC BLOOD PRESSURE: 118 MMHG | HEIGHT: 66 IN

## 2023-11-21 DIAGNOSIS — I10 PRIMARY HYPERTENSION: ICD-10-CM

## 2023-11-21 DIAGNOSIS — F33.0 MILD EPISODE OF RECURRENT MAJOR DEPRESSIVE DISORDER: ICD-10-CM

## 2023-11-21 DIAGNOSIS — N18.2 TYPE 2 DM WITH CKD STAGE 2 AND HYPERTENSION: Primary | ICD-10-CM

## 2023-11-21 DIAGNOSIS — F02.818 EARLY ONSET ALZHEIMER'S DISEASE WITH BEHAVIORAL DISTURBANCE: ICD-10-CM

## 2023-11-21 DIAGNOSIS — I12.9 TYPE 2 DM WITH CKD STAGE 2 AND HYPERTENSION: Primary | ICD-10-CM

## 2023-11-21 DIAGNOSIS — E78.2 MIXED HYPERLIPIDEMIA: ICD-10-CM

## 2023-11-21 DIAGNOSIS — E11.22 TYPE 2 DM WITH CKD STAGE 2 AND HYPERTENSION: Primary | ICD-10-CM

## 2023-11-21 DIAGNOSIS — N18.31 CHRONIC KIDNEY DISEASE, STAGE 3A: ICD-10-CM

## 2023-11-21 DIAGNOSIS — G30.0 EARLY ONSET ALZHEIMER'S DISEASE WITH BEHAVIORAL DISTURBANCE: ICD-10-CM

## 2023-11-21 LAB
ALBUMIN SERPL BCP-MCNC: 3.8 G/DL (ref 3.5–5)
ALBUMIN/GLOB SERPL: 1.1 {RATIO}
ALP SERPL-CCNC: 184 U/L (ref 55–142)
ALT SERPL W P-5'-P-CCNC: 24 U/L (ref 13–56)
ANION GAP SERPL CALCULATED.3IONS-SCNC: 10 MMOL/L (ref 7–16)
AST SERPL W P-5'-P-CCNC: 15 U/L (ref 15–37)
BASOPHILS # BLD AUTO: 0.07 K/UL (ref 0–0.2)
BASOPHILS NFR BLD AUTO: 1.3 % (ref 0–1)
BILIRUB SERPL-MCNC: 0.5 MG/DL (ref ?–1.2)
BUN SERPL-MCNC: 9 MG/DL (ref 7–18)
BUN/CREAT SERPL: 9 (ref 6–20)
CALCIUM SERPL-MCNC: 9.1 MG/DL (ref 8.5–10.1)
CHLORIDE SERPL-SCNC: 108 MMOL/L (ref 98–107)
CHOLEST SERPL-MCNC: 121 MG/DL (ref 0–200)
CHOLEST/HDLC SERPL: 2.2 {RATIO}
CO2 SERPL-SCNC: 27 MMOL/L (ref 21–32)
CREAT SERPL-MCNC: 1.05 MG/DL (ref 0.55–1.02)
DIFFERENTIAL METHOD BLD: ABNORMAL
EGFR (NO RACE VARIABLE) (RUSH/TITUS): 55 ML/MIN/1.73M2
EOSINOPHIL # BLD AUTO: 0.12 K/UL (ref 0–0.5)
EOSINOPHIL NFR BLD AUTO: 2.2 % (ref 1–4)
ERYTHROCYTE [DISTWIDTH] IN BLOOD BY AUTOMATED COUNT: 12.5 % (ref 11.5–14.5)
EST. AVERAGE GLUCOSE BLD GHB EST-MCNC: 111 MG/DL
GLOBULIN SER-MCNC: 3.6 G/DL (ref 2–4)
GLUCOSE SERPL-MCNC: 99 MG/DL (ref 74–106)
HBA1C MFR BLD HPLC: 5.5 % (ref 4.5–6.6)
HCT VFR BLD AUTO: 38.7 % (ref 38–47)
HDLC SERPL-MCNC: 55 MG/DL (ref 40–60)
HGB BLD-MCNC: 13.3 G/DL (ref 12–16)
IMM GRANULOCYTES # BLD AUTO: 0.02 K/UL (ref 0–0.04)
IMM GRANULOCYTES NFR BLD: 0.4 % (ref 0–0.4)
LDLC SERPL CALC-MCNC: 51 MG/DL
LYMPHOCYTES # BLD AUTO: 1.43 K/UL (ref 1–4.8)
LYMPHOCYTES NFR BLD AUTO: 25.7 % (ref 27–41)
MCH RBC QN AUTO: 30 PG (ref 27–31)
MCHC RBC AUTO-ENTMCNC: 34.4 G/DL (ref 32–36)
MCV RBC AUTO: 87.2 FL (ref 80–96)
MONOCYTES # BLD AUTO: 0.34 K/UL (ref 0–0.8)
MONOCYTES NFR BLD AUTO: 6.1 % (ref 2–6)
MPC BLD CALC-MCNC: 11.3 FL (ref 9.4–12.4)
NEUTROPHILS # BLD AUTO: 3.58 K/UL (ref 1.8–7.7)
NEUTROPHILS NFR BLD AUTO: 64.3 % (ref 53–65)
NONHDLC SERPL-MCNC: 66 MG/DL
NRBC # BLD AUTO: 0 X10E3/UL
NRBC, AUTO (.00): 0 %
PLATELET # BLD AUTO: 231 K/UL (ref 150–400)
POTASSIUM SERPL-SCNC: 3.7 MMOL/L (ref 3.5–5.1)
PROT SERPL-MCNC: 7.4 G/DL (ref 6.4–8.2)
RBC # BLD AUTO: 4.44 M/UL (ref 4.2–5.4)
SODIUM SERPL-SCNC: 141 MMOL/L (ref 136–145)
TRIGL SERPL-MCNC: 77 MG/DL (ref 35–150)
TSH SERPL DL<=0.005 MIU/L-ACNC: 1.27 UIU/ML (ref 0.36–3.74)
VLDLC SERPL-MCNC: 15 MG/DL
WBC # BLD AUTO: 5.56 K/UL (ref 4.5–11)

## 2023-11-21 PROCEDURE — 84443 ASSAY THYROID STIM HORMONE: CPT | Mod: ,,, | Performed by: CLINICAL MEDICAL LABORATORY

## 2023-11-21 PROCEDURE — 99213 PR OFFICE/OUTPT VISIT, EST, LEVL III, 20-29 MIN: ICD-10-PCS | Mod: ,,, | Performed by: NURSE PRACTITIONER

## 2023-11-21 PROCEDURE — 80053 COMPREHEN METABOLIC PANEL: CPT | Mod: ,,, | Performed by: CLINICAL MEDICAL LABORATORY

## 2023-11-21 PROCEDURE — 85025 CBC WITH DIFFERENTIAL: ICD-10-PCS | Mod: ,,, | Performed by: CLINICAL MEDICAL LABORATORY

## 2023-11-21 PROCEDURE — 83036 HEMOGLOBIN A1C: ICD-10-PCS | Mod: ,,, | Performed by: CLINICAL MEDICAL LABORATORY

## 2023-11-21 PROCEDURE — 80061 LIPID PANEL: ICD-10-PCS | Mod: ,,, | Performed by: CLINICAL MEDICAL LABORATORY

## 2023-11-21 PROCEDURE — 80053 COMPREHENSIVE METABOLIC PANEL: ICD-10-PCS | Mod: ,,, | Performed by: CLINICAL MEDICAL LABORATORY

## 2023-11-21 PROCEDURE — 80061 LIPID PANEL: CPT | Mod: ,,, | Performed by: CLINICAL MEDICAL LABORATORY

## 2023-11-21 PROCEDURE — 99213 OFFICE O/P EST LOW 20 MIN: CPT | Mod: ,,, | Performed by: NURSE PRACTITIONER

## 2023-11-21 PROCEDURE — 84443 TSH: ICD-10-PCS | Mod: ,,, | Performed by: CLINICAL MEDICAL LABORATORY

## 2023-11-21 PROCEDURE — 83036 HEMOGLOBIN GLYCOSYLATED A1C: CPT | Mod: ,,, | Performed by: CLINICAL MEDICAL LABORATORY

## 2023-11-21 PROCEDURE — 85025 COMPLETE CBC W/AUTO DIFF WBC: CPT | Mod: ,,, | Performed by: CLINICAL MEDICAL LABORATORY

## 2023-11-21 NOTE — PROGRESS NOTES
Didi Singer NP   [address] [place] [address]     PATIENT NAME: Jane Clark  : 1945  DATE: 23  MRN: [id number]      Billing Provider: Didi Singer NP  Level of Service:   Patient PCP Information       Provider PCP Type    Jane Amor MD General            Reason for Visit / Chief Complaint: Establish Care (Establish Care )       Update PCP  Update Chief Complaint         History of Present Illness / Problem Focused Workflow     Jane Clark presents to the clinic with Establish Care (Establish Care )     HPI  Patient presents today wishing to establish care. She is a previous patient of Dr. Amor. She has a PMH of hypertension, hyperlipidemia, insomnia, dementia, and GERD. She also has a hx of diabetes but is not currently on medication. She has a FH of hypertension, hyperlipidemia, skin cancer. She reports often having elevated liver enzymes but is unsure if she has ever had a liver ultrasound. She has had a colonoscopy but unsure when the last one was. She is a patient of Olvin Morrissey and Andrei Townsend. She would like a flu shot today. She is due for lab work.    Review of Systems     Review of Systems   Constitutional:  Negative for activity change, appetite change, chills and fever.   HENT:  Negative for ear pain, hearing loss, trouble swallowing and voice change.    Eyes:  Negative for visual disturbance.   Respiratory:  Negative for apnea, cough, chest tightness and shortness of breath.    Cardiovascular:  Negative for chest pain, palpitations and leg swelling.   Gastrointestinal:  Negative for abdominal pain, blood in stool, change in bowel habit and reflux.   Genitourinary:  Negative for bladder incontinence, difficulty urinating and flank pain.   Musculoskeletal:  Negative for back pain, gait problem, joint swelling and myalgias.   Integumentary:  Negative for color change and pallor.   Neurological:  Positive for memory loss. Negative for dizziness, weakness,  numbness and headaches.   Psychiatric/Behavioral:  Negative for sleep disturbance. The patient is not nervous/anxious.         Medical / Social / Family History     Past Medical History:   Diagnosis Date    Achrochordon     Anxiety     Aphasia     Bronchospasm     Cervicalgia     Chest pain     Constipation     CVA (cerebral vascular accident)     Depression     Diabetes mellitus, type 2     Dizziness     Dysuria     Encounter for long-term (current) use of other medications     Encounter for screening colonoscopy 01/10/2017    Essential (primary) hypertension     GERD (gastroesophageal reflux disease)     Hiatal hernia     Hypertension HYPERLIPI    Hypokalemia     Insomnia     Low back pain     Memory impairment     Overactive bladder     Shoulder pain     Sinusitis     Sleep related leg cramps     Thoracic back pain     TMJ (dislocation of temporomandibular joint)     Vitamin B 12 deficiency     Vitamin D deficiency        Past Surgical History:   Procedure Laterality Date    BREAST BIOPSY      rt.core biopsy    carotid dopple complete      CHOLECYSTECTOMY      COLONOSCOPY  01/10/2017    repeat in 5 years    ESOPHAGOGASTRODUODENOSCOPY  11/29/2016    HYSTERECTOMY         Social History  Ms.  reports that she has never smoked. She has never been exposed to tobacco smoke. She has never used smokeless tobacco. She reports that she does not drink alcohol and does not use drugs.    Family History  Ms.'s family history includes Emphysema in her father; Heart disease in her father; Hyperlipidemia in her mother; Hypertension in her father and mother.    Medications and Allergies     Medications  Outpatient Medications Marked as Taking for the 11/21/23 encounter (Office Visit) with Didi Singer NP   Medication Sig Dispense Refill    amLODIPine (NORVASC) 5 MG tablet TAKE 1 TABLET EVERY EVENING 90 tablet 3    atorvastatin (LIPITOR) 40 MG tablet TAKE 1 TABLET ONCE DAILY 90 tablet 3    citalopram (CELEXA) 40 MG tablet TAKE  "1 TABLET ONCE DAILY 90 tablet 3    donepeziL (ARICEPT) 10 MG tablet TAKE 1 TABLET EVERY EVENING 90 tablet 3    esomeprazole (NEXIUM) 40 MG capsule Take 1 capsule (40 mg total) by mouth once daily. 90 capsule 2    fluticasone propionate (FLONASE) 50 mcg/actuation nasal spray 1 spray (50 mcg total) by Each Nostril route once daily. 48 g 3    meclizine (ANTIVERT) 25 mg tablet Take 1 tablet (25 mg total) by mouth 3 (three) times daily as needed for Dizziness or Nausea. 270 tablet 0    memantine (NAMENDA) 10 MG Tab TAKE 1 TABLET TWICE A  tablet 3    traZODone (DESYREL) 50 MG tablet TAKE 3 TABLETS (150MG      TOTAL) EVERY EVENING 270 tablet 3       Allergies  Review of patient's allergies indicates:   Allergen Reactions    Sulfa (sulfonamide antibiotics) Anaphylaxis    Codeine Nausea Only       Physical Examination   /70 (BP Location: Left arm, Patient Position: Sitting, BP Method: Large (Manual))   Pulse (!) 55   Temp 97.4 °F (36.3 °C) (Temporal)   Resp 18   Ht 5' 6" (1.676 m)   Wt 60 kg (132 lb 4.8 oz)   SpO2 97%   BMI 21.35 kg/m²    Physical Exam  Vitals and nursing note reviewed. Exam conducted with a chaperone present.   Constitutional:       Appearance: Normal appearance. She is normal weight.   HENT:      Head: Normocephalic.      Nose: Nose normal.      Mouth/Throat:      Mouth: Mucous membranes are dry.      Pharynx: Oropharynx is clear.   Eyes:      Extraocular Movements: Extraocular movements intact.      Conjunctiva/sclera: Conjunctivae normal.      Pupils: Pupils are equal, round, and reactive to light.   Cardiovascular:      Rate and Rhythm: Normal rate and regular rhythm.      Heart sounds: Normal heart sounds.   Pulmonary:      Breath sounds: Normal breath sounds.   Abdominal:      General: Bowel sounds are normal.   Musculoskeletal:         General: Normal range of motion.      Cervical back: Normal range of motion and neck supple.   Skin:     General: Skin is warm and dry. "   Neurological:      General: No focal deficit present.      Mental Status: She is alert. Mental status is at baseline.   Psychiatric:         Mood and Affect: Mood normal.          Assessment and Plan (including Health Maintenance)      Problem List  Smart Sets  Document Outside HM   :    Plan:   Lab work obtained. Will follow up with results and develop POC accordingly.  Will send in medication refills as requested.   RTC as needed or in 3-6 months for follow up.        Health Maintenance Due   Topic Date Due    Hepatitis C Screening  Never done    COVID-19 Vaccine (1) Never done    Shingles Vaccine (1 of 2) Never done    RSV Vaccine (Age 60+ and Pregnant patients) (1 - 1-dose 60+ series) Never done    TETANUS VACCINE  11/05/2022    Influenza Vaccine (1) 09/01/2023       Problem List Items Addressed This Visit          Neuro    Early onset Alzheimer's disease with behavioral disturbance       Psychiatric    Mild episode of recurrent major depressive disorder       Cardiac/Vascular    Mixed hyperlipidemia (Chronic)    Relevant Orders    Lipid Panel    Primary hypertension (Chronic)    Relevant Orders    CBC Auto Differential    TSH       Renal/    Chronic kidney disease, stage 3a    Relevant Orders    Comprehensive Metabolic Panel       Endocrine    Type 2 DM with CKD stage 2 and hypertension - Primary    Relevant Orders    Hemoglobin A1C       Health Maintenance Topics with due status: Not Due       Topic Last Completion Date    Lipid Panel 03/02/2022    DEXA Scan 05/03/2022       No future appointments.         Signature:  Didi Singer NP      [address]   [place] [address]    Date of encounter: 11/21/23

## 2023-11-28 ENCOUNTER — CLINICAL SUPPORT (OUTPATIENT)
Dept: FAMILY MEDICINE | Facility: CLINIC | Age: 78
End: 2023-11-28
Payer: MEDICARE

## 2023-11-28 DIAGNOSIS — Z23 NEED FOR PROPHYLACTIC VACCINATION AND INOCULATION AGAINST INFLUENZA: Primary | ICD-10-CM

## 2023-11-28 PROCEDURE — G0008 FLU VACCINE (QUAD) GREATER THAN OR EQUAL TO 3YO PRESERVATIVE FREE IM: ICD-10-PCS | Mod: ,,, | Performed by: NURSE PRACTITIONER

## 2023-11-28 PROCEDURE — 90686 IIV4 VACC NO PRSV 0.5 ML IM: CPT | Mod: ,,, | Performed by: NURSE PRACTITIONER

## 2023-11-28 PROCEDURE — G0008 ADMIN INFLUENZA VIRUS VAC: HCPCS | Mod: ,,, | Performed by: NURSE PRACTITIONER

## 2023-11-28 PROCEDURE — 90686 FLU VACCINE (QUAD) GREATER THAN OR EQUAL TO 3YO PRESERVATIVE FREE IM: ICD-10-PCS | Mod: ,,, | Performed by: NURSE PRACTITIONER

## 2023-12-19 ENCOUNTER — HOSPITAL ENCOUNTER (EMERGENCY)
Facility: HOSPITAL | Age: 78
Discharge: HOME | End: 2023-12-19
Payer: MEDICARE

## 2023-12-19 VITALS
TEMPERATURE: 98 F | OXYGEN SATURATION: 96 % | RESPIRATION RATE: 16 BRPM | HEIGHT: 66 IN | HEART RATE: 68 BPM | WEIGHT: 132.31 LBS | DIASTOLIC BLOOD PRESSURE: 80 MMHG | BODY MASS INDEX: 21.26 KG/M2 | SYSTOLIC BLOOD PRESSURE: 126 MMHG

## 2023-12-19 DIAGNOSIS — S62.345A CLOSED NONDISPLACED FRACTURE OF BASE OF FOURTH METACARPAL BONE OF LEFT HAND, INITIAL ENCOUNTER: ICD-10-CM

## 2023-12-19 DIAGNOSIS — S09.90XA CLOSED HEAD INJURY, INITIAL ENCOUNTER: ICD-10-CM

## 2023-12-19 DIAGNOSIS — S62.343A CLOSED NONDISPLACED FRACTURE OF BASE OF THIRD METACARPAL BONE OF LEFT HAND, INITIAL ENCOUNTER: ICD-10-CM

## 2023-12-19 DIAGNOSIS — S65.311A LACERATION OF DEEP PALMAR ARCH OF RIGHT HAND, INITIAL ENCOUNTER: ICD-10-CM

## 2023-12-19 DIAGNOSIS — S62.341A CLOSED NONDISPLACED FRACTURE OF BASE OF SECOND METACARPAL BONE OF LEFT HAND, INITIAL ENCOUNTER: ICD-10-CM

## 2023-12-19 DIAGNOSIS — G30.0 EARLY ONSET ALZHEIMER'S DISEASE WITH BEHAVIORAL DISTURBANCE: ICD-10-CM
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
## 2023-12-19 DIAGNOSIS — S80.01XA CONTUSION OF RIGHT KNEE, INITIAL ENCOUNTER: ICD-10-CM

## 2023-12-19 DIAGNOSIS — N39.0 ACUTE URINARY TRACT INFECTION: ICD-10-CM

## 2023-12-19 DIAGNOSIS — F02.818 EARLY ONSET ALZHEIMER'S DISEASE WITH BEHAVIORAL DISTURBANCE: ICD-10-CM

## 2023-12-19 DIAGNOSIS — F01.518 VASCULAR DEMENTIA WITH BEHAVIOR DISTURBANCE: Chronic | ICD-10-CM

## 2023-12-19 DIAGNOSIS — S00.83XA CONTUSION OF FACE, INITIAL ENCOUNTER: ICD-10-CM

## 2023-12-19 DIAGNOSIS — S80.02XA CONTUSION OF LEFT KNEE, INITIAL ENCOUNTER: ICD-10-CM

## 2023-12-19 LAB
ALBUMIN SERPL BCP-MCNC: 3.5 G/DL (ref 3.5–5)
ALBUMIN/GLOB SERPL: 0.9 {RATIO}
ALP SERPL-CCNC: 169 U/L (ref 55–142)
ALT SERPL W P-5'-P-CCNC: 22 U/L (ref 13–56)
ANION GAP SERPL CALCULATED.3IONS-SCNC: 14 MMOL/L (ref 7–16)
AST SERPL W P-5'-P-CCNC: 14 U/L (ref 15–37)
BACTERIA #/AREA URNS HPF: ABNORMAL /HPF
BASOPHILS # BLD AUTO: 0.04 K/UL (ref 0–0.2)
BASOPHILS NFR BLD AUTO: 0.5 % (ref 0–1)
BILIRUB SERPL-MCNC: 0.6 MG/DL (ref ?–1.2)
BILIRUB UR QL STRIP: NEGATIVE
BUN SERPL-MCNC: 15 MG/DL (ref 7–18)
BUN/CREAT SERPL: 15 (ref 6–20)
CALCIUM SERPL-MCNC: 9.3 MG/DL (ref 8.5–10.1)
CHLORIDE SERPL-SCNC: 102 MMOL/L (ref 98–107)
CLARITY UR: CLEAR
CO2 SERPL-SCNC: 24 MMOL/L (ref 21–32)
COLOR UR: ABNORMAL
CREAT SERPL-MCNC: 0.98 MG/DL (ref 0.55–1.02)
DIFFERENTIAL METHOD BLD: ABNORMAL
EGFR (NO RACE VARIABLE) (RUSH/TITUS): 60 ML/MIN/1.73M2
EOSINOPHIL # BLD AUTO: 0.06 K/UL (ref 0–0.5)
EOSINOPHIL NFR BLD AUTO: 0.7 % (ref 1–4)
ERYTHROCYTE [DISTWIDTH] IN BLOOD BY AUTOMATED COUNT: 11.9 % (ref 11.5–14.5)
GLOBULIN SER-MCNC: 3.9 G/DL (ref 2–4)
GLUCOSE SERPL-MCNC: 95 MG/DL (ref 74–106)
GLUCOSE UR STRIP-MCNC: NORMAL MG/DL
HCT VFR BLD AUTO: 37 % (ref 38–47)
HGB BLD-MCNC: 13 G/DL (ref 12–16)
IMM GRANULOCYTES # BLD AUTO: 0.02 K/UL (ref 0–0.04)
IMM GRANULOCYTES NFR BLD: 0.2 % (ref 0–0.4)
KETONES UR STRIP-SCNC: NEGATIVE MG/DL
LEUKOCYTE ESTERASE UR QL STRIP: ABNORMAL
LYMPHOCYTES # BLD AUTO: 1.93 K/UL (ref 1–4.8)
LYMPHOCYTES NFR BLD AUTO: 24 % (ref 27–41)
MAGNESIUM SERPL-MCNC: 2.4 MG/DL (ref 1.7–2.3)
MCH RBC QN AUTO: 29.8 PG (ref 27–31)
MCHC RBC AUTO-ENTMCNC: 35.1 G/DL (ref 32–36)
MCV RBC AUTO: 84.9 FL (ref 80–96)
MONOCYTES # BLD AUTO: 0.4 K/UL (ref 0–0.8)
MONOCYTES NFR BLD AUTO: 5 % (ref 2–6)
MPC BLD CALC-MCNC: 9.2 FL (ref 9.4–12.4)
MUCOUS, UA: ABNORMAL /LPF
NEUTROPHILS # BLD AUTO: 5.59 K/UL (ref 1.8–7.7)
NEUTROPHILS NFR BLD AUTO: 69.6 % (ref 53–65)
NITRITE UR QL STRIP: NEGATIVE
NRBC # BLD AUTO: 0 X10E3/UL
NRBC, AUTO (.00): 0 %
PH UR STRIP: 8.5 PH UNITS
PLATELET # BLD AUTO: 276 K/UL (ref 150–400)
POTASSIUM SERPL-SCNC: 3.1 MMOL/L (ref 3.5–5.1)
PROT SERPL-MCNC: 7.4 G/DL (ref 6.4–8.2)
PROT UR QL STRIP: 30
RBC # BLD AUTO: 4.36 M/UL (ref 4.2–5.4)
RBC # UR STRIP: NEGATIVE /UL
RBC #/AREA URNS HPF: 5 /HPF
SODIUM SERPL-SCNC: 137 MMOL/L (ref 136–145)
SP GR UR STRIP: 1.01
UROBILINOGEN UR STRIP-ACNC: NORMAL MG/DL
WBC # BLD AUTO: 8.04 K/UL (ref 4.5–11)
WBC #/AREA URNS HPF: 64 /HPF

## 2023-12-19 PROCEDURE — 81001 URINALYSIS AUTO W/SCOPE: CPT | Performed by: NURSE PRACTITIONER

## 2023-12-19 PROCEDURE — 83735 ASSAY OF MAGNESIUM: CPT | Performed by: NURSE PRACTITIONER

## 2023-12-19 PROCEDURE — 12001 RPR S/N/AX/GEN/TRNK 2.5CM/<: CPT

## 2023-12-19 PROCEDURE — 99285 PR EMERGENCY DEPT VISIT,LEVEL V: ICD-10-PCS | Mod: 25,,, | Performed by: NURSE PRACTITIONER

## 2023-12-19 PROCEDURE — 96372 THER/PROPH/DIAG INJ SC/IM: CPT | Mod: 59 | Performed by: NURSE PRACTITIONER

## 2023-12-19 PROCEDURE — 99285 EMERGENCY DEPT VISIT HI MDM: CPT | Mod: 25

## 2023-12-19 PROCEDURE — 80053 COMPREHEN METABOLIC PANEL: CPT | Performed by: NURSE PRACTITIONER

## 2023-12-19 PROCEDURE — 12001 PR RESUPERF WND BODY <2.5CM: ICD-10-PCS | Mod: ,,, | Performed by: NURSE PRACTITIONER

## 2023-12-19 PROCEDURE — 25000003 PHARM REV CODE 250: Performed by: NURSE PRACTITIONER

## 2023-12-19 PROCEDURE — 12001 RPR S/N/AX/GEN/TRNK 2.5CM/<: CPT | Mod: ,,, | Performed by: NURSE PRACTITIONER

## 2023-12-19 PROCEDURE — 99285 EMERGENCY DEPT VISIT HI MDM: CPT | Mod: 25,,, | Performed by: NURSE PRACTITIONER

## 2023-12-19 PROCEDURE — 87186 SC STD MICRODIL/AGAR DIL: CPT | Performed by: NURSE PRACTITIONER

## 2023-12-19 PROCEDURE — 87077 CULTURE AEROBIC IDENTIFY: CPT | Performed by: NURSE PRACTITIONER

## 2023-12-19 PROCEDURE — 85025 COMPLETE CBC W/AUTO DIFF WBC: CPT | Performed by: NURSE PRACTITIONER

## 2023-12-19 PROCEDURE — 63600175 PHARM REV CODE 636 W HCPCS: Performed by: NURSE PRACTITIONER

## 2023-12-19 RX ORDER — NITROFURANTOIN 25; 75 MG/1; MG/1
100 CAPSULE ORAL 2 TIMES DAILY
Qty: 20 CAPSULE | Refills: 0 | Status: SHIPPED | OUTPATIENT
Start: 2023-12-19 | End: 2023-12-29

## 2023-12-19 RX ORDER — IBUPROFEN 800 MG/1
800 TABLET ORAL EVERY 6 HOURS PRN
Qty: 20 TABLET | Refills: 0 | Status: SHIPPED | OUTPATIENT
Start: 2023-12-19

## 2023-12-19 RX ORDER — IBUPROFEN 800 MG/1
800 TABLET ORAL EVERY 6 HOURS PRN
Qty: 20 TABLET | Refills: 0 | Status: SHIPPED | OUTPATIENT
Start: 2023-12-19 | End: 2023-12-19

## 2023-12-19 RX ORDER — LIDOCAINE HYDROCHLORIDE 10 MG/ML
10 INJECTION, SOLUTION EPIDURAL; INFILTRATION; INTRACAUDAL; PERINEURAL
Status: COMPLETED | OUTPATIENT
Start: 2023-12-19 | End: 2023-12-19

## 2023-12-19 RX ORDER — POTASSIUM CHLORIDE 750 MG/1
30 TABLET, EXTENDED RELEASE ORAL ONCE
Status: COMPLETED | OUTPATIENT
Start: 2023-12-19 | End: 2023-12-19

## 2023-12-19 RX ORDER — NITROFURANTOIN 25; 75 MG/1; MG/1
100 CAPSULE ORAL 2 TIMES DAILY
Qty: 20 CAPSULE | Refills: 0 | Status: SHIPPED | OUTPATIENT
Start: 2023-12-19 | End: 2023-12-19

## 2023-12-19 RX ADMIN — LIDOCAINE HYDROCHLORIDE 1 G: 10 INJECTION, SOLUTION EPIDURAL; INFILTRATION; INTRACAUDAL; PERINEURAL at 09:12

## 2023-12-19 RX ADMIN — POTASSIUM CHLORIDE 30 MEQ: 750 TABLET, EXTENDED RELEASE ORAL at 09:12

## 2023-12-19 RX ADMIN — LIDOCAINE HYDROCHLORIDE 100 MG: 10 INJECTION, SOLUTION EPIDURAL; INFILTRATION; INTRACAUDAL; PERINEURAL at 04:12

## 2023-12-19 NOTE — ED TRIAGE NOTES
Pt present so ed c/o having a fall about 1 hour ago. Pt doesn't remember falling. Stated falling forward while walking and hit face.  Right hand has small laceration. Stated bilateral knees hurting and left arm hurting from elbow down.

## 2023-12-19 NOTE — ED PROVIDER NOTES
Encounter Date: 12/19/2023       History     Chief Complaint   Patient presents with    Fall     Patient was brought to the ER by family.  Patient goes to adult  related to her dementia.  Today while at  she fell while walking.  Patient does not recall the fall.  She states she remembers being on the ground.  She was a laceration to the palm of her right hand.  She complains of left hand and wrist pain.  She complains of bilateral knee pain.  She was bruising and discoloration of the right eye face.  She reports she did hit her head it was unsure of LOC. no nausea or vomiting.  Patient was cooperative with exam.  She was awake and alert but confused.    The history is provided by the patient. No  was used.     Review of patient's allergies indicates:   Allergen Reactions    Sulfa (sulfonamide antibiotics) Anaphylaxis    Codeine Nausea Only     Past Medical History:   Diagnosis Date    Achrochordon     Anxiety     Aphasia     Bronchospasm     Cervicalgia     Chest pain     Constipation     CVA (cerebral vascular accident)     Depression     Diabetes mellitus, type 2     Dizziness     Dysuria     Encounter for long-term (current) use of other medications     Encounter for screening colonoscopy 01/10/2017    Essential (primary) hypertension     GERD (gastroesophageal reflux disease)     Hiatal hernia     Hypertension HYPERLIPI    Hypokalemia     Insomnia     Low back pain     Memory impairment     Overactive bladder     Shoulder pain     Sinusitis     Sleep related leg cramps     Thoracic back pain     TMJ (dislocation of temporomandibular joint)     Vitamin B 12 deficiency     Vitamin D deficiency      Past Surgical History:   Procedure Laterality Date    BREAST BIOPSY      rt.core biopsy    carotid dopple complete      CHOLECYSTECTOMY      COLONOSCOPY  01/10/2017    repeat in 5 years    ESOPHAGOGASTRODUODENOSCOPY  11/29/2016    HYSTERECTOMY       Family History   Problem Relation Age  of Onset    Hypertension Mother     Hyperlipidemia Mother     Heart disease Father     Hypertension Father     Emphysema Father      Social History     Tobacco Use    Smoking status: Never     Passive exposure: Never    Smokeless tobacco: Never   Substance Use Topics    Alcohol use: Never    Drug use: Never     Review of Systems   Constitutional:  Positive for activity change and fatigue.   HENT:  Positive for facial swelling (Right periorbital edema).    Musculoskeletal:  Positive for arthralgias.        Edema and pain of left wrist/hand   Skin:  Positive for wound (2.5 cm laceration of the palm of right hand).   Neurological:  Positive for headaches.   Psychiatric/Behavioral:  Positive for confusion. The patient is nervous/anxious.    All other systems reviewed and are negative.      Physical Exam     Initial Vitals [12/19/23 1259]   BP Pulse Resp Temp SpO2   123/64 60 16 97.7 °F (36.5 °C) 100 %      MAP       --         Physical Exam    Nursing note and vitals reviewed.  Constitutional: She appears well-developed and well-nourished. She appears distressed.   HENT:   Head: Normocephalic.   Right Ear: External ear normal.   Left Ear: External ear normal.   Nose: Nose normal.   Mouth/Throat: Oropharynx is clear and moist.   Eyes: EOM are normal.   Neck:   Normal range of motion.  Cardiovascular:  Normal rate, normal heart sounds and intact distal pulses.           Pulmonary/Chest: Breath sounds normal.   Abdominal: Bowel sounds are normal.   Musculoskeletal:         General: Normal range of motion.      Cervical back: Normal range of motion.     Neurological: She is alert and oriented to person, place, and time. She has normal strength. GCS score is 15. GCS eye subscore is 4. GCS verbal subscore is 5. GCS motor subscore is 6.   Skin: Skin is warm and dry. Capillary refill takes less than 2 seconds.   2.5 cm laceration noted to palm of right hand.  Bleeding controlled upon arrival.   Psychiatric: She has a normal  mood and affect.         Medical Screening Exam   See Full Note    ED Course   Lac Repair    Date/Time: 12/19/2023 4:50 PM    Performed by: Berkley Yuen FNP  Authorized by: Berkley Yuen FNP    Consent:     Consent obtained:  Verbal and emergent situation    Consent given by:  Patient and guardian    Risks discussed:  Infection, need for additional repair, pain, poor cosmetic result, poor wound healing, nerve damage, vascular damage, tendon damage and retained foreign body    Alternatives discussed:  No treatment, delayed treatment, observation and referral  Universal protocol:     Procedure explained and questions answered to patient or proxy's satisfaction: yes      Relevant documents present and verified: yes      Test results available: yes      Imaging studies available: yes      Required blood products, implants, devices, and special equipment available: yes      Site/side marked: yes      Patient identity confirmed:  Verbally with patient, arm band, provided demographic data and hospital-assigned identification number  Anesthesia:     Anesthesia method:  Local infiltration    Local anesthetic:  Lidocaine 1% w/o epi  Laceration details:     Location:  Hand    Length (cm):  2.5    Depth (mm):  3  Pre-procedure details:     Preparation:  Patient was prepped and draped in usual sterile fashion  Exploration:     Limited defect created (wound extended): no      Hemostasis achieved with:  Direct pressure    Imaging outcome: foreign body not noted      Wound exploration: entire depth of wound visualized      Wound extent: no foreign bodies/material noted and no tendon damage noted      Contaminated: no    Treatment:     Area cleansed with:  Povidone-iodine and saline    Irrigation solution:  Sterile saline    Irrigation volume:  20    Irrigation method:  Syringe    Visualized foreign bodies/material removed: no      Debridement:  None    Undermining:  None    Scar revision: no    Skin repair:     Repair method:   Sutures    Suture size:  4-0    Suture material:  Prolene    Suture technique:  Simple interrupted    Number of sutures:  5  Approximation:     Approximation:  Close  Repair type:     Repair type:  Simple  Post-procedure details:     Dressing:  Bulky dressing    Procedure completion:  Tolerated well, no immediate complications    Labs Reviewed   COMPREHENSIVE METABOLIC PANEL - Abnormal; Notable for the following components:       Result Value    Potassium 3.1 (*)     Alk Phos 169 (*)     AST 14 (*)     All other components within normal limits   URINALYSIS, REFLEX TO URINE CULTURE - Abnormal; Notable for the following components:    pH, UA 8.5 (*)     Leukocytes, UA Moderate (*)     Protein, UA 30 (*)     All other components within normal limits   MAGNESIUM - Abnormal; Notable for the following components:    Magnesium 2.4 (*)     All other components within normal limits   CBC WITH DIFFERENTIAL - Abnormal; Notable for the following components:    Hematocrit 37.0 (*)     MPV 9.2 (*)     Neutrophils % 69.6 (*)     Lymphocytes % 24.0 (*)     Eosinophils % 0.7 (*)     All other components within normal limits   URINALYSIS, MICROSCOPIC - Abnormal; Notable for the following components:    WBC, UA 64 (*)     RBC, UA 5 (*)     Bacteria, UA Occasional (*)     Mucous Occasional (*)     All other components within normal limits   CULTURE, URINE   CBC W/ AUTO DIFFERENTIAL    Narrative:     The following orders were created for panel order CBC auto differential.  Procedure                               Abnormality         Status                     ---------                               -----------         ------                     CBC with Differential[[id number]]       Abnormal            Final result                 Please view results for these tests on the individual orders.          Imaging Results              CT Head Without Contrast (Final result)  Result time 12/19/23 15:10:15      Final result by Miquel Westfall  DO (12/19/23 15:10:15)                   Impression:      No convincing evidence of acute intracranial hemorrhage.  Probable chronic microvascular ischemic change and volume loss.    The CT exam was performed using one or more of the following dose    reduction techniques- Automated exposure control, adjustment of the mA    and/or kV according to patient size, and/or use of iterative    reconstructed technique.    Point of Service: [facility]      Electronically signed by: Miquel Westfall  Date:    12/19/2023  Time:    15:10               Narrative:    EXAMINATION:  CT HEAD WITHOUT CONTRAST    CLINICAL HISTORY:  Head trauma, minor (Age >= 65y);    COMPARISON:  None    TECHNIQUE:  Multiple axial tomographic images of the brain were obtained without the use of intravenous contrast.    FINDINGS:  Midline structures are nondisplaced.  No convincing evidence of acute intracranial hemorrhage.  No convincing evidence of hydrocephalus.  Study mildly motion limited.  Moderate global volume loss demonstrated.  Moderate periventricular and subcortical hypoattenuation noted which is nonspecific but consistent with chronic microvascular ischemic change. Other considerations with similar appearance include demyelinating process and vasculitis. Visualized paranasal sinuses and mastoid air cells are predominantly clear.                                       CT Maxillofacial Without Contrast (Final result)  Result time 12/19/23 15:20:01      Final result by Olvin Bucio MD (12/19/23 15:20:01)                   Impression:      No gross facial fracture when allowing for patient motion artifact.  Evaluation of the mandible is very limited because of motion      Electronically signed by: Olvin Bucio  Date:    12/19/2023  Time:    15:20               Narrative:    EXAMINATION:  CT MAXILLOFACIAL WITHOUT CONTRAST    CLINICAL HISTORY:  Facial trauma, blunt;.    COMPARISON:  No previous similar    TECHNIQUE:  Spiral  CT sections were obtained through the facial bones without IV contrast..  Sagittal and coronal multiplanar reconstruction images are also examined.    The CT examination was performed using one or more of the following dose reduction techniques: Automated exposure control, adjustment of the mA and kV according to patient's size, use of acute or iterative reconstruction techniques.    FINDINGS:  The CT brain from the same day is also available for comparison.    There is moderate patient motion artifact which grossly limits evaluation of the mandible.  No gross facial fracture is seen when accounting for motion artifact.  Globes are intact.  Paranasal sinuses are clear.                                       X-Ray Forearm Left (Final result)  Result time 12/19/23 15:11:54      Final result by Olvin Bucio MD (12/19/23 15:11:54)                   Impression:      Acute fractures of the diaphyses of the 2nd through 4th metacarpals      Electronically signed by: Olvin Bucio  Date:    12/19/2023  Time:    15:11               Narrative:    EXAMINATION:  XR FOREARM LEFT    CLINICAL HISTORY:  .  Unspecified fall, initial encounter    COMPARISON:  No previous similar    TECHNIQUE:  Left forearm AP and lateral    FINDINGS:  There is osteopenia.  There is no fracture of the forearm level.    There are acute spiral fractures of the diaphyses of the 2nd through 4th metacarpals with minimal displacement and relatively good alignment.  The hand is not completely included on this study.                                       X-Ray Knee 1 or 2 View Bilateral (Final result)  Result time 12/19/23 15:09:14   Procedure changed from X-Ray Knee 3 View Bilateral     Final result by Miquel Westfall DO (12/19/23 15:09:14)                   Impression:      As above.    Point of Service: [facility]      Electronically signed by: Miquel Westfall  Date:    12/19/2023  Time:    15:09               Narrative:     EXAMINATION:  XR KNEE 1 OR 2 VIEW BILATERAL    CLINICAL HISTORY:  Unspecified fall, initial encounterfall;    COMPARISON:  None    TECHNIQUE:  Frontal and lateral views of the bilateral knees.    FINDINGS:  Mild spurring of the superior and inferior poles of patella bilaterally.  No convincing acute fracture or dislocation demonstrated. No concerning radiopaque foreign body visualized.                                       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  Unspecified fall, initial encounter    COMPARISON:  No previous similar    TECHNIQUE:  AP, lateral, and oblique views left hand    FINDINGS:  Minimally displaced acute spiral fractures of the diaphyses of the 2nd through 4th metacarpals are noted.  There is relatively good overall alignment.  There is moderate to prominent degenerative joint space narrowing and mild osteophyte formation of the interphalangeal joints of the 2nd through 5th digits.  There is some Gull wing deformity of the DIP joints of the 2nd, 3rd, and 5th digits which suggests some old healed erosive osteoarthritis.                                       Medications   cefTRIAXone (ROCEPHIN) 1 g in LIDOcaine HCL 10 mg/ml (1%) 4 mL IM only syringe (has no administration in time range)   LIDOcaine (PF) 10 mg/ml (1%) injection 100 mg (100 mg Infiltration Given 12/19/23 1604)   potassium chloride SA CR tablet 30 mEq (30 mEq Oral Given 12/19/23 2102)     Medical Decision Making  Patient was brought to the ER by family.  Patient goes to adult   related to her dementia.  Today while at  she fell while walking.  Patient does not recall the fall.  She states she remembers being on the ground.  She was a laceration to the palm of her right hand.  She complains of left hand and wrist pain.  She complains of bilateral knee pain.  She was bruising and discoloration of the right eye face.  She reports she did hit her head it was unsure of LOC. no nausea or vomiting.  Patient was cooperative with exam.  She was awake and alert but confused.      Amount and/or Complexity of Data Reviewed  Independent Historian: caregiver  External Data Reviewed: notes.     Details: Last tetanus 11/22  Labs: ordered.  Radiology: ordered. Decision-making details documented in ED Course.     Details: CT head negative  CT face maxillofacial negative  Bilateral knee x-ray negative  Left forearm negative  Left hand/wrist 2nd 3rd and 4th metacarpal diaphoresis spiral fractures  Discussion of management or test interpretation with external provider(s): Dr. Bhandari called in consult.  Recommendation to splint patient and to have patient follow up in clinic tomorrow.  1% lidocaine used to localize laceration/wound site right hand.  Closed with 4-0 Prolene, tolerated well by patient.    Patient was discharged home with diagnosis of fall contusion of bilateral knees, closed head injury, contusion of face, right hand laceration, 2nd 3rd and 4th metacarpal fractures.  She was instructed to follow up in orthopedic clinic with Dr. Bhandari tomorrow.  She was instructed to wear splint until follow up with Ortho Clinic.  She was instructed to have her sutures removed in 10 days.  She was instructed to use ibuprofen as needed for pain and discomfort.  She was told to follow up with primary care provider in 2 days if symptoms do not improve with treatment.  Family verbalizes understanding and agrees with plan of care.    Family request patient be admitted to swing bed for rehab purposes.   Discussed patient with Dr. Mar who recommends doing lab work and speaking to hospitalist about admission.    Spoke with Dr. Connor in consult about admission for swing bed.  Recommendation to put in pfc order for placement. Surjit was requested by family members. DR Justice is in agreement with plan. PFC order placed.   Dr. Connor in ER, he spoke with patient and family regarding criteria for admission.  Patient will now be discharged home.    Risk  Prescription drug management.                                      Clinical Impression:   Final diagnoses:  [W19.XXXA] Fall  [S09.90XA] Closed head injury, initial encounter  [S00.83XA] Contusion of face, initial encounter  [S80.02XA] Contusion of left knee, initial encounter  [S80.01XA] Contusion of right knee, initial encounter  [S62.341A] Closed nondisplaced fracture of base of second metacarpal bone of left hand, initial encounter  [S62.343A] Closed nondisplaced fracture of base of third metacarpal bone of left hand, initial encounter  [S62.345A] Closed nondisplaced fracture of base of fourth metacarpal bone of left hand, initial encounter  [S65.311A] Laceration of deep palmar arch of right hand, initial encounter (Primary)  [N39.0] Acute urinary tract infection        ED Disposition Condition    Discharge Stable          ED Prescriptions       Medication Sig Dispense Start Date End Date Auth. Provider    ibuprofen (ADVIL,MOTRIN) 800 MG tablet Take 1 tablet (800 mg total) by mouth every 6 (six) hours as needed for Pain. 20 tablet 12/19/2023 -- Berkley Yuen FNP    nitrofurantoin, macrocrystal-monohydrate, (MACROBID) 100 MG capsule Take 1 capsule (100 mg total) by mouth 2 (two) times daily. for 10 days 20 capsule 12/19/2023 12/29/2023 Berkley Yuen FNP          Follow-up Information       Follow up With Specialties Details Why Contact Info    Jane Amor MD Family Medicine Schedule an appointment as soon as possible for a visit in 2 days  [address]  [facility]  [facility] [address]  [phone]      Galen Bhandari MD Orthopedic Surgery Schedule an appointment as soon as possible for a visit   [address] [place] [id number]  [phone]               Berkley Yuen, MASON  12/19/23 1652       Berkley Yuen, NOAHP  12/19/23 2014       Berkley Yuen, NOAHP  12/19/23 2121       Berkley Yuen, NOAHP  12/19/23 2122

## 2023-12-19 NOTE — DISCHARGE INSTRUCTIONS
Take medications as prescribed.  Increase fluid intake to flush urinary tract.  Call 's office to schedule follow up appointment in Ortho clinic.   Wear splint until follow up appointment in ortho clinic.  Wash right hand daily with antibacterial soap and water and cover wound when outdoors or to prevent picking at wound.  Sutures should come out in 10 days.  You may follow up with the primary care provider or return to the ER for suture removal.  Ibuprofen as needed for pain and discomfort.  Use caution when changing positions to avoid future falls.  Return to the ER with new or worsening symptoms.

## 2023-12-20 ENCOUNTER — TELEPHONE (OUTPATIENT)
Dept: EMERGENCY MEDICINE | Facility: HOSPITAL | Age: 78
End: 2023-12-20
Payer: MEDICARE

## 2023-12-20 ENCOUNTER — OFFICE VISIT (OUTPATIENT)
Dept: ORTHOPEDICS | Facility: CLINIC | Age: 78
End: 2023-12-20
Payer: MEDICARE

## 2023-12-20 DIAGNOSIS — S62.343A CLOSED NONDISPLACED FRACTURE OF BASE OF THIRD METACARPAL BONE OF LEFT HAND, INITIAL ENCOUNTER: ICD-10-CM

## 2023-12-20 DIAGNOSIS — S62.341A CLOSED NONDISPLACED FRACTURE OF BASE OF SECOND METACARPAL BONE OF LEFT HAND, INITIAL ENCOUNTER: ICD-10-CM

## 2023-12-20 DIAGNOSIS — S62.345A CLOSED NONDISPLACED FRACTURE OF BASE OF FOURTH METACARPAL BONE OF LEFT HAND, INITIAL ENCOUNTER: ICD-10-CM

## 2023-12-20 PROCEDURE — 26600 TREAT METACARPAL FRACTURE: CPT | Mod: S$PBB,F2,F1,F3 | Performed by: ORTHOPAEDIC SURGERY

## 2023-12-20 PROCEDURE — 99214 OFFICE O/P EST MOD 30 MIN: CPT | Mod: PBBFAC,25 | Performed by: ORTHOPAEDIC SURGERY

## 2023-12-20 PROCEDURE — 26600 TREAT METACARPAL FRACTURE: CPT | Mod: PBBFAC,F1,F2,F3 | Performed by: ORTHOPAEDIC SURGERY

## 2023-12-20 PROCEDURE — 26600 PR CLOSED RX METACARPAL FX: ICD-10-PCS | Mod: S$PBB,F2,F1,F3 | Performed by: ORTHOPAEDIC SURGERY

## 2023-12-20 PROCEDURE — 99204 PR OFFICE/OUTPT VISIT, NEW, LEVL IV, 45-59 MIN: ICD-10-PCS | Mod: S$PBB,57,, | Performed by: ORTHOPAEDIC SURGERY

## 2023-12-20 PROCEDURE — 99204 OFFICE O/P NEW MOD 45 MIN: CPT | Mod: S$PBB,57,, | Performed by: ORTHOPAEDIC SURGERY

## 2023-12-20 NOTE — PROGRESS NOTES
CLINIC NOTE       Chief Complaint   Patient presents with    Left Hand - Injury        Jane Clark is a 78 y.o. female seen today for evaluation of left hand injury.  She reportedly fell yesterday at .  She was seen in the emergency [facility] at [facility] by Berkley CUEVAS.  X-rays revealed fractures of the 2nd 3rd and 4th metacarpals of the left hand.  Third and 4th metacarpals were nondisplaced.  There is minimal displacement of the 2nd metacarpal.  She was splinted and referred for orthopedic consultation.  She has a history of underlying Alzheimer's dementia.    Past Medical History:   Diagnosis Date    Achrochordon     Anxiety     Aphasia     Bronchospasm     Cervicalgia     Chest pain     Constipation     CVA (cerebral vascular accident)     Depression     Diabetes mellitus, type 2     Dizziness     Dysuria     Encounter for long-term (current) use of other medications     Encounter for screening colonoscopy 01/10/2017    Essential (primary) hypertension     GERD (gastroesophageal reflux disease)     Hiatal hernia     Hypertension HYPERLIPI    Hypokalemia     Insomnia     Low back pain     Memory impairment     Overactive bladder     Shoulder pain     Sinusitis     Sleep related leg cramps     Thoracic back pain     TMJ (dislocation of temporomandibular joint)     Vitamin B 12 deficiency     Vitamin D deficiency      Family History   Problem Relation Age of Onset    Hypertension Mother     Hyperlipidemia Mother     Heart disease Father     Hypertension Father     Emphysema Father      Current Outpatient Medications on File Prior to Visit   Medication Sig Dispense Refill    amLODIPine (NORVASC) 5 MG tablet TAKE 1 TABLET EVERY EVENING 90 tablet 3    atorvastatin (LIPITOR) 40 MG tablet TAKE 1 TABLET ONCE DAILY 90 tablet 3    citalopram (CELEXA) 40 MG tablet TAKE 1 TABLET ONCE DAILY 90 tablet 3    donepeziL (ARICEPT) 10 MG tablet TAKE 1 TABLET EVERY EVENING 90 tablet 3    esomeprazole  (NEXIUM) 40 MG capsule Take 1 capsule (40 mg total) by mouth once daily. 90 capsule 2    fluticasone propionate (FLONASE) 50 mcg/actuation nasal spray 1 spray (50 mcg total) by Each Nostril route once daily. 48 g 3    ibuprofen (ADVIL,MOTRIN) 800 MG tablet Take 1 tablet (800 mg total) by mouth every 6 (six) hours as needed for Pain. 20 tablet 0    meclizine (ANTIVERT) 25 mg tablet Take 1 tablet (25 mg total) by mouth 3 (three) times daily as needed for Dizziness or Nausea. 270 tablet 0    memantine (NAMENDA) 10 MG Tab TAKE 1 TABLET TWICE A  tablet 3    nitrofurantoin, macrocrystal-monohydrate, (MACROBID) 100 MG capsule Take 1 capsule (100 mg total) by mouth 2 (two) times daily. for 10 days 20 capsule 0    traZODone (DESYREL) 50 MG tablet TAKE 3 TABLETS (150MG      TOTAL) EVERY EVENING 270 tablet 3     Current Facility-Administered Medications on File Prior to Visit   Medication Dose Route Frequency Provider Last Rate Last Admin    [COMPLETED] cefTRIAXone (ROCEPHIN) 1 g in LIDOcaine HCL 10 mg/ml (1%) 4 mL IM only syringe  1 g Intramuscular Once Wilfrid, Berkley R, FNP   1 g at 12/19/23 2152    [COMPLETED] LIDOcaine (PF) 10 mg/ml (1%) injection 100 mg  10 mL Infiltration ED 1 Time Wilfrid, Berkley R, FNP   100 mg at 12/19/23 1604    [COMPLETED] potassium chloride SA CR tablet 30 mEq  30 mEq Oral Once Wilfrid, Berkley R, FNP   30 mEq at 12/19/23 2102       ROS     There were no vitals filed for this visit.    Past Surgical History:   Procedure Laterality Date    BREAST BIOPSY      rt.core biopsy    carotid dopple complete      CHOLECYSTECTOMY      COLONOSCOPY  01/10/2017    repeat in 5 years    ESOPHAGOGASTRODUODENOSCOPY  11/29/2016    HYSTERECTOMY          Review of patient's allergies indicates:   Allergen Reactions    Sulfa (sulfonamide antibiotics) Anaphylaxis    Codeine Nausea Only        Ortho Exam :  Well-developed well-nourished  female no acute distress.  She is alert oriented cooperative.  Neck is supple  without JVD.  Breathing is regular nonlabored.  Skin is warm dry no lesions seen.  Splint was removed.  Skin is in good condition about the left hand.  Some mild soft tissue swelling.  Good cap refill all fingertips and thumb.    Radiographic Examination:    Technique:    Findings:    Impression:   See Above    Assessment and Plan  Patient Active Problem List    Diagnosis Date Noted    Type 2 DM with CKD stage 2 and hypertension 11/21/2023    Mild episode of recurrent major depressive disorder 11/21/2023    Chronic kidney disease, stage 3a 11/21/2023    Vertigo 03/02/2022    Menopause 03/02/2022    Hypokalemia 03/02/2022    Vascular dementia with behavior disturbance 03/02/2022    Depression 03/02/2022    Mixed hyperlipidemia 03/02/2022    Vitamin D deficiency 03/02/2022    Primary hypertension 03/02/2022    Insomnia 05/25/2021    Early onset Alzheimer's disease with behavioral disturbance 05/25/2021    Constipation 05/04/2021    Personal history of colonic polyps 05/04/2021    Low back pain     Impression:  Closed minimally displaced fractures left 2nd 3rd and 4th metacarpals  Plan:  Velcro wrist splint applied.  Gentle range motion exercises of the MP and IP joints outlined.  Activity restrictions discussed.  Recheck with x-rays 2 weeks or sooner for interval problems.      Galen Bhandari M.D.

## 2023-12-21 LAB — UA COMPLETE W REFLEX CULTURE PNL UR: ABNORMAL

## 2023-12-21 RX ORDER — CEFUROXIME AXETIL 500 MG/1
500 TABLET ORAL EVERY 12 HOURS
Qty: 20 TABLET | Refills: 0 | Status: SHIPPED | OUTPATIENT
Start: 2023-12-21 | End: 2023-12-31

## 2023-12-22 ENCOUNTER — TELEPHONE (OUTPATIENT)
Dept: EMERGENCY MEDICINE | Facility: HOSPITAL | Age: 78
End: 2023-12-22
Payer: MEDICARE

## 2023-12-22 NOTE — TELEPHONE ENCOUNTER
----- Message from Yuki Case, MASON sent at 12/21/2023  8:56 AM CST -----  Please notify that I sent in a RX for antibiotics

## 2024-01-09 DIAGNOSIS — S62.341A CLOSED NONDISPLACED FRACTURE OF BASE OF SECOND METACARPAL BONE OF LEFT HAND, INITIAL ENCOUNTER: Primary | ICD-10-CM

## 2024-01-10 ENCOUNTER — HOSPITAL ENCOUNTER (OUTPATIENT)
Dept: RADIOLOGY | Facility: HOSPITAL | Age: 79
Discharge: HOME | End: 2024-01-10
Attending: NURSE PRACTITIONER
Payer: MEDICARE

## 2024-01-10 ENCOUNTER — OFFICE VISIT (OUTPATIENT)
Dept: ORTHOPEDICS | Facility: CLINIC | Age: 79
End: 2024-01-10
Payer: MEDICARE

## 2024-01-10 VITALS
HEART RATE: 70 BPM | WEIGHT: 132 LBS | RESPIRATION RATE: 16 BRPM | OXYGEN SATURATION: 99 % | HEIGHT: 66 IN | BODY MASS INDEX: 21.21 KG/M2

## 2024-01-10 DIAGNOSIS — Z47.89 ENCOUNTER FOR ORTHOPEDIC FOLLOW-UP CARE: Primary | ICD-10-CM

## 2024-01-10 DIAGNOSIS — S62.341A CLOSED NONDISPLACED FRACTURE OF BASE OF SECOND METACARPAL BONE OF LEFT HAND, INITIAL ENCOUNTER: ICD-10-CM

## 2024-01-10 PROCEDURE — 73130 X-RAY EXAM OF HAND: CPT | Mod: TC,LT

## 2024-01-10 PROCEDURE — 99024 POSTOP FOLLOW-UP VISIT: CPT | Mod: ,,, | Performed by: NURSE PRACTITIONER

## 2024-01-10 PROCEDURE — 99214 OFFICE O/P EST MOD 30 MIN: CPT | Mod: PBBFAC | Performed by: NURSE PRACTITIONER

## 2024-01-10 PROCEDURE — 73130 X-RAY EXAM OF HAND: CPT | Mod: 26,LT,, | Performed by: RADIOLOGY

## 2024-01-10 NOTE — PROGRESS NOTES
57 minutes presents in wheelchair re-evaluation of her left hand.  She has been followed by  for fracture of the 2nd 3rd and 4th metacarpals 4th metacarpals were nondisplaced.  There is minimal displacement of the 2nd metacarpal.  She was seen by Dr. Bhandari on 12/20/2023.  She has been over a Velcro wrist splint.  She has been allow gentle motion of the MP and IP joints.  She does have some difficulty with dementia.  But she does sent to been following the treatment plan.      X-ray:  X-rays today of the little lateral oblique view again shows oblique fracture of the 2nd medical displacement and undisplaced further 3rd and 4th metacarpals.      PE:  Physical exam he was Velcro splint.  Skin is warm dry and intact he has been removed in x-ray.  Splint at this moment.  Left radial pulse 2/4.  Capillary refill all digits left hand less than 3 seconds.    Impression:  3 weeks following closed treatment of fracture of the left 2nd, 3rd and 4th     Plan: Safety guidelines and restrictions discussed with the family and patient.  Verbalized understanding.  We will continue the Velcro wrist splint.  Continue gentle range-of-motion exercises.  Return orthopedic clinic in 2 weeks follow-up Dr. Bhandari repeat x-ray left hand or sooner as needed.

## 2024-01-10 NOTE — PATIENT INSTRUCTIONS
Safety guidelines and restrictions discussed with the family and patient.  Verbalized understanding.  We will continue the Velcro wrist splint.  Continue gentle range-of-motion exercises.  Return orthopedic clinic in 2 weeks follow-up Dr. Bhandari repeat x-ray left hand or sooner as needed.

## 2024-01-11 ENCOUNTER — OUTPATIENT CASE MANAGEMENT (OUTPATIENT)
Dept: ADMINISTRATIVE | Facility: OTHER | Age: 79
End: 2024-01-11

## 2024-01-11 NOTE — PROGRESS NOTES
1/11/2024  1st attempt to complete Initial Assessment  for Outpatient Care Management.  Will send letter thru [facility] portal. Son, Michael asked for phone call back the week of 01/22/24. He will be out of the country next week.

## 2024-01-11 NOTE — LETTER
January 11, 2024             Dear Jane Clark:     My name is Candi Aquino RN CCM. My goal is to help you function at the healthiest and highest level possible.  You can contact me directly at [phone].    As an [facility] patient, some of the services we may be able to provide include:     Development of an individualized care plan with a Registered Nurse   Connection with a   Connection with available resources and services    Coordinate communication among your care team members   Provide coaching and education   Help you understand your doctors treatment plan  Help you obtain information about your insurance coverage.     All services provided by [facility]s Complex Care Managers and other care team members are coordinated with and communicated to your primary care team.      Sincerely,  Candi Aquino RN CCM   [facility]   Out-patient RN Complex Care Manager

## 2024-01-12 ENCOUNTER — TELEPHONE (OUTPATIENT)
Dept: FAMILY MEDICINE | Facility: CLINIC | Age: 79
End: 2024-01-12

## 2024-01-18 DIAGNOSIS — K21.9 GASTROESOPHAGEAL REFLUX DISEASE WITHOUT ESOPHAGITIS: Primary | ICD-10-CM

## 2024-01-18 RX ORDER — PANTOPRAZOLE SODIUM 40 MG/1
40 TABLET, DELAYED RELEASE ORAL DAILY
Qty: 30 TABLET | Refills: 11 | Status: SHIPPED | OUTPATIENT
Start: 2024-01-18 | End: 2025-01-17

## 2024-01-23 DIAGNOSIS — Z47.89 ENCOUNTER FOR ORTHOPEDIC FOLLOW-UP CARE: Primary | ICD-10-CM

## 2024-01-24 ENCOUNTER — OUTPATIENT CASE MANAGEMENT (OUTPATIENT)
Dept: ADMINISTRATIVE | Facility: OTHER | Age: 79
End: 2024-01-24

## 2024-01-24 NOTE — PROGRESS NOTES
01/24/24-1/24/2024  3rd attempt to complete Initial Assessment  for Outpatient Care Management, left message.  OPCM Case Closure.

## 2024-02-05 ENCOUNTER — OFFICE VISIT (OUTPATIENT)
Dept: FAMILY MEDICINE | Facility: CLINIC | Age: 79
End: 2024-02-05
Payer: MEDICARE

## 2024-02-05 VITALS
HEIGHT: 66 IN | SYSTOLIC BLOOD PRESSURE: 110 MMHG | WEIGHT: 132 LBS | BODY MASS INDEX: 21.21 KG/M2 | DIASTOLIC BLOOD PRESSURE: 70 MMHG

## 2024-02-05 DIAGNOSIS — R30.0 DYSURIA: Primary | ICD-10-CM

## 2024-02-05 LAB
BILIRUB SERPL-MCNC: NEGATIVE MG/DL
BLOOD URINE, POC: NORMAL
CLARITY, POC UA: NORMAL
COLOR, POC UA: YELLOW
GLUCOSE UR QL STRIP: NEGATIVE
KETONES UR QL STRIP: NEGATIVE
LEUKOCYTE ESTERASE URINE, POC: NORMAL
NITRITE, POC UA: NEGATIVE
PH, POC UA: 7
PROTEIN, POC: 30
SPECIFIC GRAVITY, POC UA: 1.01
UROBILINOGEN, POC UA: 0.2

## 2024-02-05 PROCEDURE — 81002 URINALYSIS NONAUTO W/O SCOPE: CPT | Mod: NDTC,,, | Performed by: NURSE PRACTITIONER

## 2024-02-05 PROCEDURE — 99499 UNLISTED E&M SERVICE: CPT | Mod: ,,, | Performed by: NURSE PRACTITIONER

## 2024-02-06 PROCEDURE — 87086 URINE CULTURE/COLONY COUNT: CPT | Mod: GZ,NDTC,, | Performed by: CLINICAL MEDICAL LABORATORY

## 2024-02-06 PROCEDURE — 87077 CULTURE AEROBIC IDENTIFY: CPT | Mod: NDTC,,, | Performed by: CLINICAL MEDICAL LABORATORY

## 2024-02-06 PROCEDURE — 87186 SC STD MICRODIL/AGAR DIL: CPT | Mod: NDTC,,, | Performed by: CLINICAL MEDICAL LABORATORY

## 2024-02-06 NOTE — PROGRESS NOTES
MASON Bunch   [facility]  [address] [facility] [address]  [phone]      PATIENT NAME: Jane Clark  : 1945  DATE: 24  MRN: [id number]      Billing Provider: MASON Bunch  Level of Service:   Patient PCP Information       Provider PCP Type    Jane Amor MD General            Reason for Visit / Chief Complaint: Urinary Tract Infection (Painful urination )       Update PCP  Update Chief Complaint         History of Present Illness / Problem Focused Workflow     Jane Clark presents to the clinic with Urinary Tract Infection (Painful urination )     Telemedicine visit, facilitated by her daughter, who is a nurse  Daughter also to obtain cath specimen collection, as patient having difficulty obtaining clean catch specimen    Urinary Tract Infection   This is a new problem. The current episode started in the past 7 days. The problem occurs every urination. The problem has been unchanged. The quality of the pain is described as burning. There has been no fever. She is Not sexually active. There is No history of pyelonephritis. Associated symptoms include frequency. Pertinent negatives include no flank pain, nausea or vomiting.       Review of Systems     Review of Systems   Constitutional:  Negative for fever.   Gastrointestinal:  Negative for diarrhea, nausea and vomiting.   Genitourinary:  Positive for dysuria and frequency. Negative for difficulty urinating and flank pain.        Medical / Social / Family History     Past Medical History:   Diagnosis Date    Achrochordon     Anxiety     Aphasia     Bronchospasm     Cervicalgia     Chest pain     Constipation     CVA (cerebral vascular accident)     Depression     Diabetes mellitus, type 2     Dizziness     Dysuria     Encounter for long-term (current) use of other medications     Encounter for screening colonoscopy 01/10/2017    Essential (primary) hypertension     GERD  (gastroesophageal reflux disease)     Hiatal hernia     Hypertension HYPERLIPI    Hypokalemia     Insomnia     Low back pain     Memory impairment     Overactive bladder     Shoulder pain     Sinusitis     Sleep related leg cramps     Thoracic back pain     TMJ (dislocation of temporomandibular joint)     Vitamin B 12 deficiency     Vitamin D deficiency        Past Surgical History:   Procedure Laterality Date    BREAST BIOPSY      rt.core biopsy    carotid dopple complete      CHOLECYSTECTOMY      COLONOSCOPY  01/10/2017    repeat in 5 years    ESOPHAGOGASTRODUODENOSCOPY  11/29/2016    HYSTERECTOMY         Social History  Ms. Clark  reports that she has never smoked. She has never been exposed to tobacco smoke. She has never used smokeless tobacco. She reports that she does not drink alcohol and does not use drugs.    Family History  Ms. Clark's family history includes Emphysema in her father; Heart disease in her father; Hyperlipidemia in her mother; Hypertension in her father and mother.    Medications and Allergies     Medications  Outpatient Medications Marked as Taking for the 2/5/24 encounter (Office Visit) with Yuki Field FNP   Medication Sig Dispense Refill    atorvastatin (LIPITOR) 40 MG tablet TAKE 1 TABLET ONCE DAILY 90 tablet 3    citalopram (CELEXA) 40 MG tablet TAKE 1 TABLET ONCE DAILY 90 tablet 3    donepeziL (ARICEPT) 10 MG tablet TAKE 1 TABLET EVERY EVENING 90 tablet 3    fluticasone propionate (FLONASE) 50 mcg/actuation nasal spray 1 spray (50 mcg total) by Each Nostril route once daily. 48 g 3    ibuprofen (ADVIL,MOTRIN) 800 MG tablet Take 1 tablet (800 mg total) by mouth every 6 (six) hours as needed for Pain. 20 tablet 0    meclizine (ANTIVERT) 25 mg tablet Take 1 tablet (25 mg total) by mouth 3 (three) times daily as needed for Dizziness or Nausea. 270 tablet 0    memantine (NAMENDA) 10 MG Tab TAKE 1 TABLET TWICE A  tablet 3    pantoprazole (PROTONIX) 40 MG tablet Take 1 tablet  (40 mg total) by mouth once daily. 30 tablet 11    traZODone (DESYREL) 50 MG tablet TAKE 3 TABLETS (150MG      TOTAL) EVERY EVENING 270 tablet 3       Allergies  Review of patient's allergies indicates:   Allergen Reactions    Sulfa (sulfonamide antibiotics) Anaphylaxis    Codeine Nausea Only       Physical Examination     Vitals:    02/05/24 1605   BP: 110/70     Physical Exam  Vitals (Vital signs and assessment obtained per daughter, who is our cllinic nurse) and nursing note reviewed.   Constitutional:       Appearance: Normal appearance.   HENT:      Head: Normocephalic and atraumatic.   Cardiovascular:      Heart sounds: No murmur heard.  Pulmonary:      Effort: Pulmonary effort is normal. No respiratory distress.      Breath sounds: Normal breath sounds.   Abdominal:      Palpations: Abdomen is soft.   Skin:     General: Skin is warm and dry.      Coloration: Skin is not jaundiced.   Neurological:      Mental Status: She is alert. Mental status is at baseline.      Comments: Ambulates without difficulty          Lab Results   Component Value Date    WBC 8.04 12/19/2023    HGB 13.0 12/19/2023    HCT 37.0 (L) 12/19/2023    MCV 84.9 12/19/2023     12/19/2023        Sodium   Date Value Ref Range Status   12/19/2023 137 136 - 145 mmol/L Final     Potassium   Date Value Ref Range Status   12/19/2023 3.1 (L) 3.5 - 5.1 mmol/L Final     Chloride   Date Value Ref Range Status   12/19/2023 102 98 - 107 mmol/L Final     CO2   Date Value Ref Range Status   12/19/2023 24 21 - 32 mmol/L Final     Glucose   Date Value Ref Range Status   12/19/2023 95 74 - 106 mg/dL Final     BUN   Date Value Ref Range Status   12/19/2023 15 7 - 18 mg/dL Final     Creatinine   Date Value Ref Range Status   12/19/2023 0.98 0.55 - 1.02 mg/dL Final     Calcium   Date Value Ref Range Status   12/19/2023 9.3 8.5 - 10.1 mg/dL Final     Total Protein   Date Value Ref Range Status   12/19/2023 7.4 6.4 - 8.2 g/dL Final     Albumin   Date Value  "Ref Range Status   12/19/2023 3.5 3.5 - 5.0 g/dL Final     Bilirubin, Total   Date Value Ref Range Status   12/19/2023 0.6 >0.0 - 1.2 mg/dL Final     Alk Phos   Date Value Ref Range Status   12/19/2023 169 (H) 55 - 142 U/L Final     AST   Date Value Ref Range Status   12/19/2023 14 (L) 15 - 37 U/L Final     ALT   Date Value Ref Range Status   12/19/2023 22 13 - 56 U/L Final     Anion Gap   Date Value Ref Range Status   12/19/2023 14 7 - 16 mmol/L Final     eGFR   Date Value Ref Range Status   12/19/2023 60 >=60 mL/min/1.73m2 Final      Lab Results   Component Value Date    HGBA1C 5.5 11/21/2023      Lab Results   Component Value Date    CHOL 121 11/21/2023    CHOL 138 03/02/2022     Lab Results   Component Value Date    HDL 55 11/21/2023    HDL 59 03/02/2022     Lab Results   Component Value Date    LDLCALC 51 11/21/2023    LDLCALC 59 03/02/2022     No results found for: "DLDL"  Lab Results   Component Value Date    TRIG 77 11/21/2023    TRIG 99 03/02/2022     Lab Results   Component Value Date    CHOLHDL 2.2 11/21/2023    CHOLHDL 2.3 03/02/2022      Lab Results   Component Value Date    TSH 1.270 11/21/2023        Assessment and Plan (including Health Maintenance)      Problem List  Smart Sets  Document Outside HM   :    Plan:     1. Dysuria  -     POCT URINE DIPSTICK WITHOUT MICROSCOPE  -     Urine culture         There are no Patient Instructions on file for this visit.     Health Maintenance Due   Topic Date Due    Hepatitis C Screening  Never done    COVID-19 Vaccine (1) Never done    Diabetes Urine Screening  Never done    Shingles Vaccine (1 of 2) Never done    RSV Vaccine (Age 60+ and Pregnant patients) (1 - 1-dose 60+ series) Never done    TETANUS VACCINE  11/05/2022         Health Maintenance Topics with due status: Not Due       Topic Last Completion Date    DEXA Scan 05/03/2022    Lipid Panel 11/21/2023    Hemoglobin A1c 11/21/2023    Eye Exam 12/06/2023       Future Appointments   Date Time Provider " Department Center   2/9/2024 10:00 AM Didi Singer NP [facility] [place]            Signature:  MASON Bunch  [facility]  [address] [facility] [place] [address]  [phone]    Date of encounter: 2/5/24

## 2024-02-08 DIAGNOSIS — N39.0 URINARY TRACT INFECTION WITHOUT HEMATURIA, SITE UNSPECIFIED: Primary | ICD-10-CM

## 2024-02-08 LAB
UA COMPLETE W REFLEX CULTURE PNL UR: ABNORMAL
UA COMPLETE W REFLEX CULTURE PNL UR: ABNORMAL

## 2024-02-08 RX ORDER — CIPROFLOXACIN 250 MG/5ML
KIT ORAL
Qty: 140 ML | Refills: 0 | Status: SHIPPED | OUTPATIENT
Start: 2024-02-08

## 2024-02-09 ENCOUNTER — OFFICE VISIT (OUTPATIENT)
Dept: FAMILY MEDICINE | Facility: CLINIC | Age: 79
End: 2024-02-09
Payer: MEDICARE

## 2024-02-09 VITALS
RESPIRATION RATE: 18 BRPM | TEMPERATURE: 98 F | WEIGHT: 120 LBS | OXYGEN SATURATION: 97 % | BODY MASS INDEX: 19.29 KG/M2 | SYSTOLIC BLOOD PRESSURE: 100 MMHG | HEART RATE: 66 BPM | DIASTOLIC BLOOD PRESSURE: 60 MMHG | HEIGHT: 66 IN

## 2024-02-09 DIAGNOSIS — K59.00 CONSTIPATION, UNSPECIFIED CONSTIPATION TYPE: ICD-10-CM

## 2024-02-09 DIAGNOSIS — N39.0 URINARY TRACT INFECTION WITHOUT HEMATURIA, SITE UNSPECIFIED: ICD-10-CM

## 2024-02-09 DIAGNOSIS — F02.818 EARLY ONSET ALZHEIMER'S DISEASE WITH BEHAVIORAL DISTURBANCE: ICD-10-CM

## 2024-02-09 DIAGNOSIS — F03.90 DEMENTIA, UNSPECIFIED DEMENTIA SEVERITY, UNSPECIFIED DEMENTIA TYPE, UNSPECIFIED WHETHER BEHAVIORAL, PSYCHOTIC, OR MOOD DISTURBANCE OR ANXIETY: Primary | ICD-10-CM

## 2024-02-09 DIAGNOSIS — F32.1 MAJOR DEPRESSIVE DISORDER, SINGLE EPISODE, MODERATE: ICD-10-CM

## 2024-02-09 DIAGNOSIS — G30.0 EARLY ONSET ALZHEIMER'S DISEASE WITH BEHAVIORAL DISTURBANCE: ICD-10-CM

## 2024-02-09 DIAGNOSIS — F33.0 MILD EPISODE OF RECURRENT MAJOR DEPRESSIVE DISORDER: ICD-10-CM

## 2024-02-09 PROBLEM — N18.31 CHRONIC KIDNEY DISEASE, STAGE 3A: Status: RESOLVED | Noted: 2023-11-21 | Resolved: 2024-02-09

## 2024-02-09 PROBLEM — E11.22 TYPE 2 DM WITH CKD STAGE 2 AND HYPERTENSION: Status: RESOLVED | Noted: 2023-11-21 | Resolved: 2024-02-09

## 2024-02-09 PROBLEM — I12.9 TYPE 2 DM WITH CKD STAGE 2 AND HYPERTENSION: Status: RESOLVED | Noted: 2023-11-21 | Resolved: 2024-02-09

## 2024-02-09 PROBLEM — N18.2 TYPE 2 DM WITH CKD STAGE 2 AND HYPERTENSION: Status: RESOLVED | Noted: 2023-11-21 | Resolved: 2024-02-09

## 2024-02-09 PROCEDURE — 96372 THER/PROPH/DIAG INJ SC/IM: CPT | Mod: ,,, | Performed by: NURSE PRACTITIONER

## 2024-02-09 PROCEDURE — 99213 OFFICE O/P EST LOW 20 MIN: CPT | Mod: 25,,, | Performed by: NURSE PRACTITIONER

## 2024-02-09 RX ORDER — CEFTRIAXONE 1 G/1
1 INJECTION, POWDER, FOR SOLUTION INTRAMUSCULAR; INTRAVENOUS
Status: COMPLETED | OUTPATIENT
Start: 2024-02-09 | End: 2024-02-09

## 2024-02-09 RX ADMIN — CEFTRIAXONE 1 G: 1 INJECTION, POWDER, FOR SOLUTION INTRAMUSCULAR; INTRAVENOUS at 11:02

## 2024-02-09 NOTE — PROGRESS NOTES
Didi Singer NP   [address] [place] [address]     PATIENT NAME: Jane Clark  : 1945  DATE: 24  MRN: [id number]      Billing Provider: Didi Singer NP  Level of Service:   Patient PCP Information       Provider PCP Type    Jane Amor MD General            Reason for Visit / Chief Complaint: Urinary Tract Infection and evaluation for home health        Update PCP  Update Chief Complaint         History of Present Illness / Problem Focused Workflow     Jane Clark presents to the clinic with Urinary Tract Infection and evaluation for home health      Urinary Tract Infection   Pertinent negatives include no chills or flank pain.     Patient presents today for referral to home health. She has family present during visit. They would like to use [facility]. She has been losing weight due to lack of appetite. She has also had some increased confusion, incontinence, difficulty with speech, and right side motor strength. She has an upcoming appointment with Dr. Morrissey to discuss these concerns. She was recently diagnosed with a UTI and is still on medication for this. She was struggling with constipation but did have significant output following a laxative. Family would still like a KUB to make sure this is resolved. She is not longer taking her BP medication. Readings are wnl. Diabetes is controlled with diet and A1C is normal. Last lab work was in November with no significant abnormalities. Kidney function at last check was wnl. She denies any other needs at today's visit.    Review of Systems     Review of Systems   Constitutional:  Positive for appetite change. Negative for activity change, chills and fever.   HENT:  Negative for ear pain, hearing loss, trouble swallowing and voice change.    Eyes:  Negative for visual disturbance.   Respiratory:  Negative for apnea, cough, chest tightness and shortness of breath.    Cardiovascular:  Negative for chest pain, palpitations and leg  swelling.   Gastrointestinal:  Negative for abdominal pain, blood in stool, change in bowel habit and reflux.   Genitourinary:  Negative for bladder incontinence, difficulty urinating and flank pain.   Musculoskeletal:  Negative for back pain, gait problem, joint swelling and myalgias.   Integumentary:  Negative for color change and pallor.   Neurological:  Positive for speech difficulty, weakness and memory loss. Negative for dizziness, numbness and headaches.   Psychiatric/Behavioral:  Negative for sleep disturbance. The patient is not nervous/anxious.         Medical / Social / Family History     Past Medical History:   Diagnosis Date    Achrochordon     Anxiety     Aphasia     Bronchospasm     Cervicalgia     Chest pain     Constipation     CVA (cerebral vascular accident)     Depression     Diabetes mellitus, type 2     Dizziness     Dysuria     Encounter for long-term (current) use of other medications     Encounter for screening colonoscopy 01/10/2017    Essential (primary) hypertension     GERD (gastroesophageal reflux disease)     Hiatal hernia     Hypertension HYPERLIPI    Hypokalemia     Insomnia     Low back pain     Memory impairment     Overactive bladder     Shoulder pain     Sinusitis     Sleep related leg cramps     Thoracic back pain     TMJ (dislocation of temporomandibular joint)     Vitamin B 12 deficiency     Vitamin D deficiency        Past Surgical History:   Procedure Laterality Date    BREAST BIOPSY      rt.core biopsy    carotid dopple complete      CHOLECYSTECTOMY      COLONOSCOPY  01/10/2017    repeat in 5 years    ESOPHAGOGASTRODUODENOSCOPY  11/29/2016    HYSTERECTOMY         Social History  Ms.  reports that she has never smoked. She has never been exposed to tobacco smoke. She has never used smokeless tobacco. She reports that she does not drink alcohol and does not use drugs.    Family History  Ms.'s family history includes Emphysema in her father; Heart disease in her father;  "Hyperlipidemia in her mother; Hypertension in her father and mother.    Medications and Allergies     Medications  Outpatient Medications Marked as Taking for the 2/9/24 encounter (Office Visit) with Didi Singer NP   Medication Sig Dispense Refill    atorvastatin (LIPITOR) 40 MG tablet TAKE 1 TABLET ONCE DAILY 90 tablet 3    ciprofloxacin 250 mg/5 ml (CIPRO) Take 10 milliliters PO BID X 7 days 140 mL 0    citalopram (CELEXA) 40 MG tablet TAKE 1 TABLET ONCE DAILY 90 tablet 3    donepeziL (ARICEPT) 10 MG tablet TAKE 1 TABLET EVERY EVENING 90 tablet 3    fluticasone propionate (FLONASE) 50 mcg/actuation nasal spray 1 spray (50 mcg total) by Each Nostril route once daily. 48 g 3    ibuprofen (ADVIL,MOTRIN) 800 MG tablet Take 1 tablet (800 mg total) by mouth every 6 (six) hours as needed for Pain. 20 tablet 0    meclizine (ANTIVERT) 25 mg tablet Take 1 tablet (25 mg total) by mouth 3 (three) times daily as needed for Dizziness or Nausea. 270 tablet 0    memantine (NAMENDA) 10 MG Tab TAKE 1 TABLET TWICE A  tablet 3    pantoprazole (PROTONIX) 40 MG tablet Take 1 tablet (40 mg total) by mouth once daily. 30 tablet 11    traZODone (DESYREL) 50 MG tablet TAKE 3 TABLETS (150MG      TOTAL) EVERY EVENING 270 tablet 3     Current Facility-Administered Medications for the 2/9/24 encounter (Office Visit) with Didi Singer NP   Medication Dose Route Frequency Provider Last Rate Last Admin    [COMPLETED] cefTRIAXone injection 1 g  1 g Intramuscular 1 time in Clinic/HOD Didi Singer NP   1 g at 02/09/24 1107       Allergies  Review of patient's allergies indicates:   Allergen Reactions    Sulfa (sulfonamide antibiotics) Anaphylaxis    Codeine Nausea Only       Physical Examination   /60 (BP Location: Left arm, Patient Position: Sitting, BP Method: Medium (Manual))   Pulse 66   Temp 98.3 °F (36.8 °C) (Temporal)   Resp 18   Ht 5' 6" (1.676 m)   Wt 54.4 kg (120 lb)   SpO2 97%   BMI 19.37 kg/m²    Physical " Exam  Vitals and nursing note reviewed.   Constitutional:       Appearance: Normal appearance. She is normal weight.   HENT:      Head: Normocephalic.      Nose: Nose normal.      Mouth/Throat:      Mouth: Mucous membranes are moist.      Pharynx: Oropharynx is clear.   Eyes:      Extraocular Movements: Extraocular movements intact.      Conjunctiva/sclera: Conjunctivae normal.      Pupils: Pupils are equal, round, and reactive to light.   Cardiovascular:      Rate and Rhythm: Normal rate and regular rhythm.      Heart sounds: Normal heart sounds.   Pulmonary:      Breath sounds: Normal breath sounds.   Abdominal:      General: Bowel sounds are normal.   Musculoskeletal:         General: Normal range of motion.      Cervical back: Normal range of motion and neck supple.   Skin:     General: Skin is warm and dry.   Neurological:      General: No focal deficit present.      Mental Status: She is alert.   Psychiatric:         Mood and Affect: Mood normal.          Assessment and Plan (including Health Maintenance)      Problem List  Smart Sets  Document Outside HM   :    Plan:   Referral to [facility] in progress.  Kub ordered will follow up with results.   Follow up with Dr. Morrissey as scheduled.  RTC as needed.        Health Maintenance Due   Topic Date Due    Hepatitis C Screening  Never done    COVID-19 Vaccine (1) Never done    Diabetes Urine Screening  Never done    Shingles Vaccine (1 of 2) Never done    RSV Vaccine (Age 60+ and Pregnant patients) (1 - 1-dose 60+ series) Never done    TETANUS VACCINE  11/05/2022       Problem List Items Addressed This Visit          Neuro    Early onset Alzheimer's disease with behavioral disturbance       Psychiatric    Mild episode of recurrent major depressive disorder    Major depressive disorder, single episode, moderate     Other Visit Diagnoses       Dementia, unspecified dementia severity, unspecified dementia type, unspecified whether behavioral, psychotic, or  mood disturbance or anxiety    -  Primary    Relevant Orders    Ambulatory referral/consult to Home Health    Urinary tract infection without hematuria, site unspecified        Relevant Medications    cefTRIAXone injection 1 g (Completed)            Health Maintenance Topics with due status: Not Due       Topic Last Completion Date    DEXA Scan 05/03/2022    Lipid Panel 11/21/2023    Hemoglobin A1c 11/21/2023    Eye Exam 12/06/2023       Future Appointments   Date Time Provider Department Center   3/12/2024  4:15 PM Olvin Morrissey FNP [facility]            Signature:  Didi Singer NP      [address]  [place] [address]    Date of encounter: 2/9/24

## 2024-02-13 ENCOUNTER — HOSPITAL ENCOUNTER (OUTPATIENT)
Dept: RADIOLOGY | Facility: HOSPITAL | Age: 79
Discharge: HOME | End: 2024-02-13
Attending: NURSE PRACTITIONER
Payer: MEDICARE

## 2024-02-13 DIAGNOSIS — K59.00 CONSTIPATION, UNSPECIFIED CONSTIPATION TYPE: ICD-10-CM

## 2024-02-13 PROCEDURE — 74018 RADEX ABDOMEN 1 VIEW: CPT | Mod: TC

## 2024-02-13 PROCEDURE — 74018 RADEX ABDOMEN 1 VIEW: CPT | Mod: 26,,, | Performed by: STUDENT IN AN ORGANIZED HEALTH CARE EDUCATION/TRAINING PROGRAM

## 2024-03-09 DIAGNOSIS — Z71.89 COMPLEX CARE COORDINATION: ICD-10-CM

## 2024-03-12 ENCOUNTER — OFFICE VISIT (OUTPATIENT)
Dept: NEUROLOGY | Facility: CLINIC | Age: 79
End: 2024-03-12
Payer: MEDICARE

## 2024-03-12 VITALS
HEIGHT: 66 IN | DIASTOLIC BLOOD PRESSURE: 72 MMHG | HEART RATE: 99 BPM | SYSTOLIC BLOOD PRESSURE: 126 MMHG | WEIGHT: 121 LBS | OXYGEN SATURATION: 96 % | BODY MASS INDEX: 19.44 KG/M2

## 2024-03-12 DIAGNOSIS — F51.01 PRIMARY INSOMNIA: ICD-10-CM

## 2024-03-12 DIAGNOSIS — F33.0 MILD EPISODE OF RECURRENT MAJOR DEPRESSIVE DISORDER: ICD-10-CM

## 2024-03-12 DIAGNOSIS — F02.C4 SEVERE LATE ONSET ALZHEIMER'S DEMENTIA WITH ANXIETY: Primary | ICD-10-CM

## 2024-03-12 DIAGNOSIS — G30.1 SEVERE LATE ONSET ALZHEIMER'S DEMENTIA WITH ANXIETY: Primary | ICD-10-CM

## 2024-03-12 PROCEDURE — 99214 OFFICE O/P EST MOD 30 MIN: CPT | Mod: PBBFAC | Performed by: NURSE PRACTITIONER

## 2024-03-12 PROCEDURE — 99213 OFFICE O/P EST LOW 20 MIN: CPT | Mod: S$PBB,,, | Performed by: NURSE PRACTITIONER

## 2024-03-12 NOTE — PATIENT INSTRUCTIONS
1. Continue current aricept and namenda as directed  2. Continue to encourage good eating and sleep habits  3. Continue current triple therapy for stroke prevention  4. Continue the celexa daily  5. Take the trazodone 50mg tablet around 1500 and take another 50mg at night    Stroke risk modifiers:    1. Good sleep habits, recommend at least 7 hours total sleep daily  2. Continue management of blood pressure and cholesterol including medications, exercise, and good eating habits  3. Exercise as much as possible, recommend 5 days of the week for 30 minutes each day  4. Avoid smoking and alcohol  5. Go to the nearest emergency department immediately if any new or worsening weakness, speech difficulty, or numbness

## 2024-03-12 NOTE — PROGRESS NOTES
Subjective:       Patient ID: Jane Clark is a 78 y.o. female     Chief Complaint:    Chief Complaint   Patient presents with    Memory Loss          Allergies:  Sulfa (sulfonamide antibiotics) and Codeine    Current Medications:    Outpatient Encounter Medications as of 3/12/2024   Medication Sig Dispense Refill    atorvastatin (LIPITOR) 40 MG tablet TAKE 1 TABLET ONCE DAILY 90 tablet 3    ciprofloxacin 250 mg/5 ml (CIPRO) Take 10 milliliters PO BID X 7 days 140 mL 0    citalopram (CELEXA) 40 MG tablet TAKE 1 TABLET ONCE DAILY 90 tablet 3    donepeziL (ARICEPT) 10 MG tablet TAKE 1 TABLET EVERY EVENING 90 tablet 3    fluticasone propionate (FLONASE) 50 mcg/actuation nasal spray 1 spray (50 mcg total) by Each Nostril route once daily. 48 g 3    ibuprofen (ADVIL,MOTRIN) 800 MG tablet Take 1 tablet (800 mg total) by mouth every 6 (six) hours as needed for Pain. 20 tablet 0    meclizine (ANTIVERT) 25 mg tablet Take 1 tablet (25 mg total) by mouth 3 (three) times daily as needed for Dizziness or Nausea. 270 tablet 0    memantine (NAMENDA) 10 MG Tab TAKE 1 TABLET TWICE A  tablet 3    pantoprazole (PROTONIX) 40 MG tablet Take 1 tablet (40 mg total) by mouth once daily. 30 tablet 11    traZODone (DESYREL) 50 MG tablet TAKE 3 TABLETS (150MG      TOTAL) EVERY EVENING 270 tablet 3     No facility-administered encounter medications on file as of 3/12/2024.       History of Present Illness  77 y/o WF followed in clinic for alzheimer's dementia, insomnia, and CVA.  It has been 15 months since last clinic visit.    Her son are present with her in clinic.    Last MMSE in clinic 13/30    Dr. Gregg prior testing reflected dementia, more mixed etiology but was not able to r/o possibly Alzheimer's.  This was in 2019.  She is prescribed aricept and namenda , however there has continued to be gradually worsening in impairment as is typical of this diagnosis.  She is living with family.  She attends local adult  5  days of the week.  Family reports essentially now patient is not able to perform ADLs at all currently and they must assist in this.  She has difficulty with using items such as utensils, they are using more foods that patient can  such as sandwiches.  They report she is drinking plenty of fluids.  She has celexa to take daily for agitation/anxiety, she has been treated also with trazodone which is prescribed up to 150mg daily, son reports she seems more anxiety daily around 1500, I suggest takign 50mg of trazodone around that time and continue the remainder dosing at night.  She is interactive in clinic.  On ambulation she has clearly noted worsening gait, her son has to hold her hand which is significant from last visit.  She is currently getting PT two days of the week through home health to assist with this.    Prior sleep study evaluation was negative.    She is on triple therpay and tolerating well.  Denies any CVA symptoms since last visit.    Noted CT head done in the emergency department in December of 2023 after a fall did not reflect any new CVA or injury.           Review of Systems  Review of Systems   Constitutional:  Negative for diaphoresis and fever.   HENT:  Negative for congestion, hearing loss and tinnitus.    Eyes:  Negative for blurred vision, double vision, photophobia, discharge and redness.   Respiratory:  Negative for cough and shortness of breath.    Cardiovascular:  Negative for chest pain.   Gastrointestinal:  Negative for abdominal pain, nausea and vomiting.   Musculoskeletal:  Positive for falls. Negative for back pain, joint pain, myalgias and neck pain.   Skin:  Negative for itching and rash.   Neurological:  Positive for weakness. Negative for dizziness, tremors, sensory change, speech change, focal weakness, seizures, loss of consciousness and headaches.   Psychiatric/Behavioral:  Positive for depression and memory loss. Negative for hallucinations. The patient has insomnia.     All other systems reviewed and are negative.     Objective:     NEUROLOGICAL EXAMINATION:     MENTAL STATUS   Oriented to person, place, and time.   Registration: recalls 2 of 3 objects. Recall at 5 minutes: recalls 1 of 3 objects.   Attention: decreased. Concentration: decreased.   Speech: speech is normal   Level of consciousness: alert  Knowledge: poor and inconsistent with education.   Abnormal comprehension. Praxis: abnormal     CRANIAL NERVES     CN II   Visual fields full to confrontation.   Visual acuity: normal  Right visual field deficit: none  Left visual field deficit: none     CN III, IV, VI   Pupils are equal, round, and reactive to light.  Extraocular motions are normal.   Right pupil: Size: 3 mm. Shape: regular. Reactivity: brisk. Consensual response: intact. Accommodation: intact.   Left pupil: Size: 3 mm. Shape: regular. Reactivity: brisk. Consensual response: intact. Accommodation: intact.   CN III: no CN III palsy  CN VI: no CN VI palsy  Nystagmus: none   Diplopia: none  Upgaze: normal  Downgaze: normal  Conjugate gaze: present  Vestibulo-ocular reflex: present    CN V   Facial sensation intact.   Right facial sensation deficit: none  Left facial sensation deficit: none  Right corneal reflex: normal  Left corneal reflex: normal    CN VII   Facial expression full, symmetric.   Right facial weakness: none  Left facial weakness: none  Right taste: normal  Left taste: normal    CN VIII   CN VIII normal.   Hearing: intact    CN IX, X   CN IX normal.   CN X normal.   Palate: symmetric    CN XI   CN XI normal.   Right sternocleidomastoid strength: normal  Left sternocleidomastoid strength: normal  Right trapezius strength: normal  Left trapezius strength: normal    CN XII   CN XII normal.   Tongue: not atrophic  Fasciculations: absent  Tongue deviation: none    MOTOR EXAM   Muscle bulk: decreased  Overall muscle tone: normal  Right arm tone: normal  Left arm tone: normal  Right arm pronator drift:  absent  Left arm pronator drift: absent  Right leg tone: decreased  Left leg tone: decreased    Strength   Right neck flexion: 5/5  Left neck flexion: 5/5  Right neck extension: 5/5  Left neck extension: 5/5  Right deltoid: 5/5  Left deltoid: 5/5  Right biceps: 5/5  Left biceps: 5/5  Right triceps: 5/5  Left triceps: 5/5  Right wrist flexion: 5/5  Left wrist flexion: 5/5  Right wrist extension: 5/5  Left wrist extension: 5/5  Right interossei: 5/5  Left interossei: 5/5  Right iliopsoas: 4/5  Left iliopsoas: 4/5  Right quadriceps: 4/5  Left quadriceps: 4/5  Right hamstrin/5  Left hamstrin/5  Right anterior tibial: 4/5  Left anterior tibial: 4/5  Right posterior tibial: 4/5  Left posterior tibial: 4/5  Right gastroc: 4/5  Left gastroc: 4/5    REFLEXES     Reflexes   Right brachioradialis: 2+  Left brachioradialis: 2+  Right biceps: 2+  Left biceps: 2+  Right triceps: 2+  Left triceps: 2+  Right patellar: 2+  Left patellar: 2+  Right achilles: 2+  Left achilles: 2+  Right plantar: normal  Left plantar: normal  Right Vásquez: absent  Left Vásquez: absent  Right ankle clonus: absent  Left ankle clonus: absent  Right pendular knee jerk: absent  Left pendular knee jerk: absent    GAIT AND COORDINATION     Gait  Gait: wide-based     Coordination   Finger to nose coordination: normal  Heel to shin coordination: abnormal  Tandem walking coordination: abnormal    Tremor   Resting tremor: absent  Intention tremor: absent  Action tremor: absent       Physical Exam  Vitals and nursing note reviewed.   Constitutional:       Appearance: Normal appearance.   HENT:      Head: Normocephalic.   Eyes:      Extraocular Movements: Extraocular movements intact and EOM normal.      Pupils: Pupils are equal, round, and reactive to light.   Cardiovascular:      Rate and Rhythm: Normal rate and regular rhythm.   Pulmonary:      Effort: Pulmonary effort is normal.      Breath sounds: Normal breath sounds.   Musculoskeletal:          General: No swelling or tenderness. Normal range of motion.      Cervical back: Normal range of motion and neck supple.      Right lower leg: No edema.      Left lower leg: No edema.   Skin:     General: Skin is warm and dry.      Coloration: Skin is not jaundiced.      Findings: No rash.   Neurological:      General: No focal deficit present.      Mental Status: She is alert and oriented to person, place, and time.      GCS: GCS eye subscore is 4. GCS verbal subscore is 5. GCS motor subscore is 6.      Cranial Nerves: No cranial nerve deficit.      Sensory: No sensory deficit.      Motor: Weakness present.      Coordination: Coordination abnormal. Heel to Shin Test abnormal. Finger-Nose-Finger Test normal.      Gait: Gait abnormal and tandem walk abnormal.      Deep Tendon Reflexes: Reflexes normal.      Reflex Scores:       Tricep reflexes are 2+ on the right side and 2+ on the left side.       Bicep reflexes are 2+ on the right side and 2+ on the left side.       Brachioradialis reflexes are 2+ on the right side and 2+ on the left side.       Patellar reflexes are 2+ on the right side and 2+ on the left side.       Achilles reflexes are 2+ on the right side and 2+ on the left side.  Psychiatric:         Attention and Perception: She is inattentive.         Mood and Affect: Mood normal.         Speech: Speech normal.         Behavior: Behavior normal. Behavior is cooperative.         Thought Content: Thought content is not paranoid or delusional.         Cognition and Memory: Cognition is impaired. Memory is impaired.          Assessment:     Problem List Items Addressed This Visit          Neuro    Severe late onset Alzheimer's dementia with anxiety - Primary       Psychiatric    Mild episode of recurrent major depressive disorder       Other    Insomnia          Primary Diagnosis and ICD10  Severe late onset Alzheimer's dementia with anxiety [G30.1, F02.C4]    Plan:     Patient Instructions   1. Continue current  aricept and namenda as directed  2. Continue to encourage good eating and sleep habits  3. Continue current triple therapy for stroke prevention  4. Continue the celexa daily  5. Take the trazodone 50mg tablet around 1500 and take another 50mg at night    Stroke risk modifiers:    1. Good sleep habits, recommend at least 7 hours total sleep daily  2. Continue management of blood pressure and cholesterol including medications, exercise, and good eating habits  3. Exercise as much as possible, recommend 5 days of the week for 30 minutes each day  4. Avoid smoking and alcohol  5. Go to the nearest emergency department immediately if any new or worsening weakness, speech difficulty, or numbness    There are no discontinued medications.      Requested Prescriptions      No prescriptions requested or ordered in this encounter       No orders of the defined types were placed in this encounter.

## 2024-03-21 ENCOUNTER — HOSPITAL ENCOUNTER (EMERGENCY)
Facility: HOSPITAL | Age: 79
Discharge: HOME | End: 2024-03-21
Attending: FAMILY MEDICINE
Payer: MEDICARE

## 2024-03-21 VITALS
OXYGEN SATURATION: 99 % | WEIGHT: 122 LBS | SYSTOLIC BLOOD PRESSURE: 139 MMHG | HEART RATE: 61 BPM | DIASTOLIC BLOOD PRESSURE: 73 MMHG | BODY MASS INDEX: 19.69 KG/M2 | TEMPERATURE: 98 F | RESPIRATION RATE: 16 BRPM
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
## 2024-03-21 PROCEDURE — 99284 EMERGENCY DEPT VISIT MOD MDM: CPT | Mod: 25

## 2024-03-21 PROCEDURE — 99283 EMERGENCY DEPT VISIT LOW MDM: CPT | Mod: ,,, | Performed by: FAMILY MEDICINE

## 2024-03-22 ENCOUNTER — TELEPHONE (OUTPATIENT)
Dept: EMERGENCY MEDICINE | Facility: HOSPITAL | Age: 79
End: 2024-03-22
Payer: MEDICARE

## 2024-03-22 NOTE — ED PROVIDER NOTES
Encounter Date: 3/21/2024    SCRIBE #1 NOTE: I, Misty Marti, am scribing for, and in the presence of,  Chapito Mar DO.       History     Chief Complaint   Patient presents with    Fall     Patient is a 78 y.o. Female that presents to the ED with c//o of A Fall. Patient's relative stated she fell and hit her head on the right side. Patient has an extensive MHx  with a few aliments that consists of Dizziness, Encounter for long-term (current) use of other medications, HTN and Anxiety.       The history is provided by the patient and a relative. No  was used.     Review of patient's allergies indicates:   Allergen Reactions    Sulfa (sulfonamide antibiotics) Anaphylaxis    Codeine Nausea Only     Past Medical History:   Diagnosis Date    Achrochordon     Anxiety     Aphasia     Bronchospasm     Cervicalgia     Chest pain     Constipation     CVA (cerebral vascular accident)     Depression     Diabetes mellitus, type 2     Dizziness     Dysuria     Encounter for long-term (current) use of other medications     Encounter for screening colonoscopy 01/10/2017    Essential (primary) hypertension     GERD (gastroesophageal reflux disease)     Hiatal hernia     Hypertension HYPERLIPI    Hypokalemia     Insomnia     Low back pain     Memory impairment     Overactive bladder     Shoulder pain     Sinusitis     Sleep related leg cramps     Thoracic back pain     TMJ (dislocation of temporomandibular joint)     Vitamin B 12 deficiency     Vitamin D deficiency      Past Surgical History:   Procedure Laterality Date    BREAST BIOPSY      rt.core biopsy    carotid dopple complete      CHOLECYSTECTOMY      COLONOSCOPY  01/10/2017    repeat in 5 years    ESOPHAGOGASTRODUODENOSCOPY  11/29/2016    HYSTERECTOMY       Family History   Problem Relation Age of Onset    Hypertension Mother     Hyperlipidemia Mother     Heart disease Father     Hypertension Father     Emphysema Father      Social History      Tobacco Use    Smoking status: Never     Passive exposure: Never    Smokeless tobacco: Never   Substance Use Topics    Alcohol use: Never    Drug use: Never     Review of Systems   Constitutional:  Negative for fever.   Respiratory:  Negative for cough.    Cardiovascular:  Negative for chest pain.   Gastrointestinal:  Negative for diarrhea.   All other systems reviewed and are negative.      Physical Exam     Initial Vitals [03/21/24 1923]   BP Pulse Resp Temp SpO2   (!) 111/57 69 18 97.6 °F (36.4 °C) 99 %      MAP       --         Physical Exam    Constitutional: She appears well-developed and well-nourished.   HENT:   Head: Normocephalic and atraumatic.   Right Ear: External ear normal.   Left Ear: External ear normal.   Nose: Nose normal.   Mouth/Throat: Oropharynx is clear and moist.   Eyes: Conjunctivae and EOM are normal. Pupils are equal, round, and reactive to light.   Neck: Neck supple.   Normal range of motion.  Cardiovascular:  Normal rate, regular rhythm, normal heart sounds and intact distal pulses.           Pulmonary/Chest: Breath sounds normal.   Abdominal: Abdomen is soft. Bowel sounds are normal.   Genitourinary:    Vagina and uterus normal.     Musculoskeletal:         General: Normal range of motion.      Cervical back: Normal range of motion and neck supple.      Comments: Fell, hit head     Neurological: She is alert and oriented to person, place, and time. She has normal strength and normal reflexes.   Skin: Skin is warm. Capillary refill takes less than 2 seconds.   Psychiatric: She has a normal mood and affect. Her behavior is normal. Judgment and thought content normal.         ED Course   Procedures  Labs Reviewed - No data to display       Imaging Results              CT Head Without Contrast (In process)                      Medications - No data to display  Medical Decision Making  Amount and/or Complexity of Data Reviewed  Radiology: ordered. Decision-making details documented in  ED Course.              Attending Attestation:           Physician Attestation for Scribe:  Physician Attestation Statement for Scribe #1: I, Chapito Mar DO, reviewed documentation, as scribed by Misty Marti in my presence, and it is both accurate and complete.             ED Course as of 03/21/24 2310   u Mar 21, 2024   0966 CT Head Without Contrast [CM]      ED Course User Index  [CM] Misty Marti               Medical Decision Making:   Initial Assessment:   Patient doing well with no sign contusions.  Differential Diagnosis:   Patient with fall with dementia  ED Management:  Follow-up regular doctor             Clinical Impression:  Final diagnoses:  [W19.XXXA] Fall, initial encounter (Primary)          ED Disposition Condition    Discharge Stable          ED Prescriptions    None       Follow-up Information    None          Chapito Mar DO  03/21/24 6033

## 2024-03-28 ENCOUNTER — EXTERNAL HOME HEALTH (OUTPATIENT)
Dept: HOME HEALTH SERVICES | Facility: HOSPITAL | Age: 79
End: 2024-03-28

## 2024-03-28 DIAGNOSIS — F32.A DEPRESSION, UNSPECIFIED DEPRESSION TYPE: Chronic | ICD-10-CM

## 2024-03-28 DIAGNOSIS — E78.2 MIXED HYPERLIPIDEMIA: ICD-10-CM

## 2024-03-28 RX ORDER — CITALOPRAM 40 MG/1
TABLET, FILM COATED ORAL
Qty: 90 TABLET | Refills: 3 | Status: SHIPPED | OUTPATIENT
Start: 2024-03-28

## 2024-03-28 RX ORDER — ATORVASTATIN CALCIUM 40 MG/1
40 TABLET, FILM COATED ORAL DAILY
Qty: 90 TABLET | Refills: 3 | Status: SHIPPED | OUTPATIENT
Start: 2024-03-28

## 2024-04-22 DIAGNOSIS — F41.9 ANXIETY: Primary | ICD-10-CM

## 2024-04-22 RX ORDER — BUSPIRONE HYDROCHLORIDE 5 MG/1
TABLET ORAL
Qty: 30 TABLET | Refills: 5 | Status: SHIPPED | OUTPATIENT
Start: 2024-04-22 | End: 2024-05-14

## 2024-05-14 DIAGNOSIS — F41.9 ANXIETY: ICD-10-CM

## 2024-05-14 RX ORDER — BUSPIRONE HYDROCHLORIDE 5 MG/1
TABLET ORAL
Qty: 60 TABLET | Refills: 5 | Status: SHIPPED | OUTPATIENT
Start: 2024-05-14 | End: 2024-06-12 | Stop reason: SDUPTHER

## 2024-06-12 DIAGNOSIS — F41.9 ANXIETY: ICD-10-CM

## 2024-06-12 RX ORDER — BUSPIRONE HYDROCHLORIDE 5 MG/1
TABLET ORAL
Qty: 180 TABLET | Refills: 3 | Status: SHIPPED | OUTPATIENT
Start: 2024-06-12

## 2024-07-10 ENCOUNTER — HOSPITAL ENCOUNTER (EMERGENCY)
Facility: HOSPITAL | Age: 79
Discharge: HOME | End: 2024-07-10
Payer: MEDICARE

## 2024-07-10 VITALS
BODY MASS INDEX: 19.16 KG/M2 | WEIGHT: 115 LBS | DIASTOLIC BLOOD PRESSURE: 57 MMHG | HEART RATE: 87 BPM | SYSTOLIC BLOOD PRESSURE: 120 MMHG | TEMPERATURE: 98 F | OXYGEN SATURATION: 98 % | HEIGHT: 65 IN | RESPIRATION RATE: 18 BRPM

## 2024-07-10 DIAGNOSIS — S02.401A CLOSED FRACTURE OF MAXILLARY SINUS, INITIAL ENCOUNTER: ICD-10-CM

## 2024-07-10 DIAGNOSIS — S02.31XA CLOSED FRACTURE OF RIGHT ORBITAL FLOOR, INITIAL ENCOUNTER: Primary | ICD-10-CM

## 2024-07-10 PROCEDURE — 99284 EMERGENCY DEPT VISIT MOD MDM: CPT | Mod: 25

## 2024-07-10 PROCEDURE — 25000003 PHARM REV CODE 250: Performed by: NURSE PRACTITIONER

## 2024-07-10 RX ORDER — TRAMADOL HYDROCHLORIDE 50 MG/1
50 TABLET ORAL EVERY 6 HOURS PRN
Qty: 12 TABLET | Refills: 0 | Status: SHIPPED | OUTPATIENT
Start: 2024-07-10

## 2024-07-10 RX ORDER — ONDANSETRON 4 MG/1
4 TABLET, FILM COATED ORAL EVERY 6 HOURS
Qty: 12 TABLET | Refills: 0 | Status: SHIPPED | OUTPATIENT
Start: 2024-07-10

## 2024-07-10 RX ORDER — TRAMADOL HYDROCHLORIDE 50 MG/1
50 TABLET ORAL
Status: COMPLETED | OUTPATIENT
Start: 2024-07-10 | End: 2024-07-10

## 2024-07-10 RX ORDER — TRAMADOL HYDROCHLORIDE 50 MG/1
50 TABLET ORAL EVERY 6 HOURS PRN
Qty: 12 TABLET | Refills: 0 | Status: SHIPPED | OUTPATIENT
Start: 2024-07-10 | End: 2024-07-10

## 2024-07-10 RX ORDER — AMOXICILLIN AND CLAVULANATE POTASSIUM 875; 125 MG/1; MG/1
1 TABLET, FILM COATED ORAL 2 TIMES DAILY
Qty: 14 TABLET | Refills: 0 | Status: SHIPPED | OUTPATIENT
Start: 2024-07-10

## 2024-07-10 RX ORDER — AMOXICILLIN AND CLAVULANATE POTASSIUM 875; 125 MG/1; MG/1
1 TABLET, FILM COATED ORAL
Status: COMPLETED | OUTPATIENT
Start: 2024-07-10 | End: 2024-07-10

## 2024-07-10 RX ADMIN — TRAMADOL HYDROCHLORIDE 50 MG: 50 TABLET, COATED ORAL at 10:07

## 2024-07-10 RX ADMIN — AMOXICILLIN AND CLAVULANATE POTASSIUM 1 TABLET: 875; 125 TABLET, FILM COATED ORAL at 10:07

## 2024-07-11 DIAGNOSIS — F02.818 EARLY ONSET ALZHEIMER'S DISEASE WITH BEHAVIORAL DISTURBANCE: Primary | ICD-10-CM

## 2024-07-11 DIAGNOSIS — G30.0 EARLY ONSET ALZHEIMER'S DISEASE WITH BEHAVIORAL DISTURBANCE: Primary | ICD-10-CM

## 2024-07-11 DIAGNOSIS — R29.6 FALLS FREQUENTLY: ICD-10-CM

## 2024-07-11 NOTE — ED PROVIDER NOTES
Encounter Date: 7/10/2024       History     Chief Complaint   Patient presents with    Fall     Pov to ed - pt c/o fell and hit her head - pt stated fell at 1745 and hit her head on metal bed frame hit R eyebrow  - pt is not on any blood thinners     78 year old female presents to ED with complaint of head injury/eyebrow injury status post fall. Family reports patient was in other room and attempted to get up out of bed. They believe patient got tangled up in her blanket and patient fell hitting her head on the bed frame. Patient denies LOC; family reports they came in as soon as they heard her fall. Patient denies headache, blurred vision/double vision, nausea/vomiting, dizziness, weakness.     The history is provided by the patient and a relative.     Review of patient's allergies indicates:   Allergen Reactions    Sulfa (sulfonamide antibiotics) Anaphylaxis    Codeine Nausea Only     Past Medical History:   Diagnosis Date    Achrochordon     Anxiety     Aphasia     Bronchospasm     Cervicalgia     Chest pain     Constipation     CVA (cerebral vascular accident)     Depression     Diabetes mellitus, type 2     Dizziness     Dysuria     Encounter for long-term (current) use of other medications     Encounter for screening colonoscopy 01/10/2017    Essential (primary) hypertension     GERD (gastroesophageal reflux disease)     Hiatal hernia     Hypertension HYPERLIPI    Hypokalemia     Insomnia     Low back pain     Memory impairment     Overactive bladder     Shoulder pain     Sinusitis     Sleep related leg cramps     Thoracic back pain     TMJ (dislocation of temporomandibular joint)     Vitamin B 12 deficiency     Vitamin D deficiency      Past Surgical History:   Procedure Laterality Date    BREAST BIOPSY      rt.core biopsy    carotid dopple complete      CHOLECYSTECTOMY      COLONOSCOPY  01/10/2017    repeat in 5 years    ESOPHAGOGASTRODUODENOSCOPY  11/29/2016    HYSTERECTOMY       Family History   Problem  Relation Name Age of Onset    Hypertension Mother      Hyperlipidemia Mother      Heart disease Father      Hypertension Father      Emphysema Father       Social History     Tobacco Use    Smoking status: Never     Passive exposure: Never    Smokeless tobacco: Never   Substance Use Topics    Alcohol use: Never    Drug use: Never     Review of Systems   Constitutional:  Negative for chills and fever.   Eyes:  Negative for photophobia and visual disturbance.   Respiratory:  Negative for cough and shortness of breath.    Cardiovascular:  Negative for chest pain and palpitations.   Gastrointestinal:  Negative for nausea and vomiting.   Musculoskeletal:  Negative for arthralgias and gait problem.   Skin:  Positive for wound. Negative for color change.   Neurological:  Negative for dizziness and headaches.   Hematological:  Negative for adenopathy. Does not bruise/bleed easily.   Psychiatric/Behavioral:  Negative for agitation and confusion.    All other systems reviewed and are negative.      Physical Exam     Initial Vitals [07/10/24 1917]   BP Pulse Resp Temp SpO2   (!) 120/57 87 18 97.7 °F (36.5 °C) 98 %      MAP       --         Physical Exam    Nursing note and vitals reviewed.  Constitutional: She appears well-developed and well-nourished.   HENT:   Head: Normocephalic.       Eyes: EOM are normal. Pupils are equal, round, and reactive to light.   Neck: Neck supple.   Normal range of motion.  Cardiovascular:  Normal rate and regular rhythm.           No murmur heard.  Pulmonary/Chest: She has no wheezes. She has no rhonchi.   Abdominal: Abdomen is soft. She exhibits no distension. There is no abdominal tenderness.   Musculoskeletal:         General: No tenderness or edema.      Cervical back: Normal range of motion and neck supple.     Lymphadenopathy:     She has no cervical adenopathy.   Neurological: She is alert and oriented to person, place, and time. No cranial nerve deficit or sensory deficit.   Skin: Skin  is warm and dry. Capillary refill takes less than 2 seconds.   Psychiatric: She has a normal mood and affect. Thought content normal.         Medical Screening Exam   See Full Note    ED Course   Procedures  Labs Reviewed - No data to display       Imaging Results              CT Head Without Contrast (In process)                      CT Maxillofacial Without Contrast (In process)                      CT Cervical Spine Without Contrast (In process)                      Medications   traMADoL tablet 50 mg (50 mg Oral Given 7/10/24 2203)   amoxicillin-clavulanate 875-125mg per tablet 1 tablet (1 tablet Oral Given 7/10/24 2204)     Medical Decision Making  78 year old female presents to ED with complaint of head injury/eyebrow injury status post fall. Family reports patient was in other room and attempted to get up out of bed. They believe patient got tangled up in her blanket and patient fell hitting her head on the bed frame. Patient denies LOC; family reports they came in as soon as they heard her fall. Patient denies headache, blurred vision/double vision, nausea/vomiting, dizziness, weakness.     Diagnostics obtained and reviewed. PO Augmentin, Ultram administered. Prescriptions provided. OMFS referral placed. Steri strips applied to abrasion to right side of head    Amount and/or Complexity of Data Reviewed  Radiology: ordered.     Details: C-spine negative for acute fracture or dislocation  CT head no acute processes; chronic left frontal lobe and left basal ganglia lacunar infarct  CT Maxillofacial:  Right orbital floor fracture, right anterior maxillary sinus wall fracture, nondisplaced right inferior lateral maxillary sinus fracture    Risk  Prescription drug management.                                      Clinical Impression:   Final diagnoses:  [S02.31XA] Closed fracture of right orbital floor, initial encounter (Primary)  [S02.401A] Closed fracture of maxillary sinus, initial encounter        ED  Disposition Condition    Discharge Stable          ED Prescriptions       Medication Sig Dispense Start Date End Date Auth. Provider    amoxicillin-clavulanate 875-125mg (AUGMENTIN) 875-125 mg per tablet Take 1 tablet by mouth 2 (two) times daily. 14 tablet 7/10/2024 -- Tania Sharma FNP    traMADoL (ULTRAM) 50 mg tablet  (Status: Discontinued) Take 1 tablet (50 mg total) by mouth every 6 (six) hours as needed for Pain. 12 tablet 7/10/2024 7/10/2024 Tania Sharma FNP    ondansetron (ZOFRAN) 4 MG tablet Take 1 tablet (4 mg total) by mouth every 6 (six) hours. 12 tablet 7/10/2024 -- Tania Sharma FNP    traMADoL (ULTRAM) 50 mg tablet Take 1 tablet (50 mg total) by mouth every 6 (six) hours as needed for Pain. 12 tablet 7/10/2024 -- Tania Sharma FNP          Follow-up Information    None          Tania Sharma, MASON  07/10/24 2221       Tania Sharma, MASON  07/10/24 0211

## 2024-07-12 ENCOUNTER — TELEPHONE (OUTPATIENT)
Dept: EMERGENCY MEDICINE | Facility: HOSPITAL | Age: 79
End: 2024-07-12
Payer: MEDICARE

## 2024-07-18 ENCOUNTER — OFFICE VISIT (OUTPATIENT)
Dept: FAMILY MEDICINE | Facility: CLINIC | Age: 79
End: 2024-07-18
Payer: MEDICARE

## 2024-07-18 VITALS
RESPIRATION RATE: 16 BRPM | OXYGEN SATURATION: 99 % | TEMPERATURE: 98 F | SYSTOLIC BLOOD PRESSURE: 122 MMHG | WEIGHT: 113 LBS | BODY MASS INDEX: 18.8 KG/M2 | DIASTOLIC BLOOD PRESSURE: 60 MMHG | HEART RATE: 78 BPM

## 2024-07-18 DIAGNOSIS — Z09 FOLLOW-UP EXAM: Primary | ICD-10-CM

## 2024-07-18 PROCEDURE — 99212 OFFICE O/P EST SF 10 MIN: CPT | Mod: ,,, | Performed by: NURSE PRACTITIONER

## 2024-07-18 NOTE — PROGRESS NOTES
Didi Singer NP   [address] [place] [address]     PATIENT NAME: Jane Clark  : 1945  DATE: 24  MRN: [id number]      Billing Provider: Didi Singer NP  Level of Service:   Patient PCP Information       Provider PCP Type    Jane Amor MD General            Reason for Visit / Chief Complaint: Follow-up (Pt presents to clinic this morning for a follow up visit after an in patient behavioral health stay.)       Update PCP  Update Chief Complaint         History of Present Illness / Problem Focused Workflow     Jane Clark presents to the clinic with Follow-up (Pt presents to clinic this morning for a follow up visit after an in patient behavioral health stay.)     Follow-up  Pertinent negatives include no abdominal pain, change in bowel habit, chest pain, chills, coughing, fever, headaches, joint swelling, myalgias, numbness or weakness.     Patient presents to clinic today for follow up after being inpatient in behavioral health unit. There were several medication adjustments made that family had to readjust once home. Patient was unable to walk, stand, etc. Unassisted due to all the medication she was taking. She is now n buspar x 2 in the morning, prozac daily, and trazodone nightly and as needed for anxiety. She seems to be doing much better now that she is home and medication adjustments have been made. She does continue to have crying spells and episodes of anxiety. These are managed with trazodone. She does seem to be sleeping better. She is maintaining her weight and has been eating well this week. She has physical therapy twice weekly through home health. They are considering hospice but have not yet made a decision. She continues to go to  during the day and they are pleased with the care she receives there. Denies any needs at today's visit.    Review of Systems     Review of Systems   Constitutional:  Negative for activity change, appetite change, chills and  fever.   HENT:  Negative for ear pain, hearing loss, trouble swallowing and voice change.    Eyes:  Negative for visual disturbance.   Respiratory:  Negative for apnea, cough, chest tightness and shortness of breath.    Cardiovascular:  Negative for chest pain, palpitations and leg swelling.   Gastrointestinal:  Negative for abdominal pain, blood in stool, change in bowel habit and reflux.   Genitourinary:  Negative for bladder incontinence, difficulty urinating and flank pain.   Musculoskeletal:  Negative for back pain, gait problem, joint swelling and myalgias.   Integumentary:  Negative for color change and pallor.   Neurological:  Negative for dizziness, weakness, numbness and headaches.   Psychiatric/Behavioral:  Negative for sleep disturbance. The patient is not nervous/anxious.         Medical / Social / Family History     Past Medical History:   Diagnosis Date    Achrochordon     Anxiety     Aphasia     Bronchospasm     Cervicalgia     Chest pain     Constipation     CVA (cerebral vascular accident)     Depression     Diabetes mellitus, type 2     Dizziness     Dysuria     Encounter for long-term (current) use of other medications     Encounter for screening colonoscopy 01/10/2017    Essential (primary) hypertension     GERD (gastroesophageal reflux disease)     Hiatal hernia     Hypertension HYPERLIPI    Hypokalemia     Insomnia     Low back pain     Memory impairment     Overactive bladder     Shoulder pain     Sinusitis     Sleep related leg cramps     Thoracic back pain     TMJ (dislocation of temporomandibular joint)     Vitamin B 12 deficiency     Vitamin D deficiency        Past Surgical History:   Procedure Laterality Date    BREAST BIOPSY      rt.core biopsy    carotid dopple complete      CHOLECYSTECTOMY      COLONOSCOPY  01/10/2017    repeat in 5 years    ESOPHAGOGASTRODUODENOSCOPY  11/29/2016    HYSTERECTOMY         Social History  Ms.  reports that she has never smoked. She has never been  exposed to tobacco smoke. She has never used smokeless tobacco. She reports that she does not drink alcohol and does not use drugs.    Family History  Ms.'s family history includes Emphysema in her father; Heart disease in her father; Hyperlipidemia in her mother; Hypertension in her father and mother.    Medications and Allergies     Medications  No outpatient medications have been marked as taking for the 7/18/24 encounter (Office Visit) with Didi Singer NP.       Allergies  Review of patient's allergies indicates:   Allergen Reactions    Sulfa (sulfonamide antibiotics) Anaphylaxis    Codeine Nausea Only       Physical Examination   /60   Pulse 78   Temp 98 °F (36.7 °C)   Resp 16   Wt 51.3 kg (113 lb)   SpO2 99%   BMI 18.80 kg/m²    Physical Exam  Vitals and nursing note reviewed.   Constitutional:       Appearance: Normal appearance. She is normal weight.   HENT:      Head: Normocephalic.      Nose: Nose normal.      Mouth/Throat:      Mouth: Mucous membranes are moist.   Eyes:      Extraocular Movements: Extraocular movements intact.      Conjunctiva/sclera: Conjunctivae normal.      Pupils: Pupils are equal, round, and reactive to light.   Cardiovascular:      Rate and Rhythm: Normal rate and regular rhythm.      Pulses: Normal pulses.      Heart sounds: Normal heart sounds.   Pulmonary:      Effort: Pulmonary effort is normal.      Breath sounds: Normal breath sounds.   Abdominal:      General: Abdomen is flat. Bowel sounds are normal.      Palpations: Abdomen is soft.   Musculoskeletal:         General: Normal range of motion.      Cervical back: Normal range of motion and neck supple.   Skin:     General: Skin is warm and dry.      Capillary Refill: Capillary refill takes less than 2 seconds.   Neurological:      General: No focal deficit present.      Mental Status: She is alert and oriented to person, place, and time.   Psychiatric:         Behavior: Behavior normal.         Thought  Content: Thought content normal.          Assessment and Plan (including Health Maintenance)      Problem List  Smart Sets  Document Outside HM   :    Plan:   Continue current medications.   Continue home health and PT.   RTC as needed or if s/s worsen or persist.        Health Maintenance Due   Topic Date Due    Hepatitis C Screening  Never done    Shingles Vaccine (1 of 2) Never done    RSV Vaccine (Age 60+ and Pregnant patients) (1 - 1-dose 60+ series) Never done    TETANUS VACCINE  11/05/2022    COVID-19 Vaccine (1 - 2023-24 season) Never done       Problem List Items Addressed This Visit    None      Health Maintenance Topics with due status: Not Due       Topic Last Completion Date    DEXA Scan 05/03/2022    Lipid Panel 11/21/2023    Influenza Vaccine 11/28/2023       No future appointments.         Signature:  Didi Singer NP      [address]   [place] [address]    Date of encounter: 7/18/24

## 2024-07-27 ENCOUNTER — HOSPITAL ENCOUNTER (EMERGENCY)
Facility: HOSPITAL | Age: 79
Discharge: HOME | End: 2024-07-28
Attending: EMERGENCY MEDICINE
Payer: MEDICARE

## 2024-07-27 DIAGNOSIS — R31.9 URINARY TRACT INFECTION WITH HEMATURIA, SITE UNSPECIFIED: Primary | ICD-10-CM

## 2024-07-27 DIAGNOSIS — K59.00 CONSTIPATION: ICD-10-CM

## 2024-07-27 DIAGNOSIS — N39.0 URINARY TRACT INFECTION WITH HEMATURIA, SITE UNSPECIFIED: Primary | ICD-10-CM

## 2024-07-27 DIAGNOSIS — R05.9 COUGH: ICD-10-CM

## 2024-07-27 LAB
ALBUMIN SERPL BCP-MCNC: 3.1 G/DL (ref 3.5–5)
ALBUMIN/GLOB SERPL: 1.1 {RATIO}
ALP SERPL-CCNC: 120 U/L (ref 55–142)
ALT SERPL W P-5'-P-CCNC: 19 U/L (ref 13–56)
AMORPHOUS CRYSTALS, UA (OHS): ABNORMAL /HPF
ANION GAP SERPL CALCULATED.3IONS-SCNC: 7 MMOL/L (ref 7–16)
AST SERPL W P-5'-P-CCNC: 14 U/L (ref 15–37)
BACTERIA #/AREA URNS HPF: ABNORMAL /HPF
BASOPHILS # BLD AUTO: 0.04 K/UL (ref 0–0.2)
BASOPHILS NFR BLD AUTO: 0.3 % (ref 0–1)
BILIRUB SERPL-MCNC: 0.3 MG/DL (ref ?–1.2)
BILIRUB UR QL STRIP: NEGATIVE
BUN SERPL-MCNC: 27 MG/DL (ref 7–18)
BUN/CREAT SERPL: 36 (ref 6–20)
CALCIUM SERPL-MCNC: 8.6 MG/DL (ref 8.5–10.1)
CAOX CRY UR QL COMP ASSIST: ABNORMAL
CHLORIDE SERPL-SCNC: 107 MMOL/L (ref 98–107)
CLARITY UR: ABNORMAL
CO2 SERPL-SCNC: 29 MMOL/L (ref 21–32)
COLOR UR: YELLOW
CREAT SERPL-MCNC: 0.75 MG/DL (ref 0.55–1.02)
DIFFERENTIAL METHOD BLD: ABNORMAL
EGFR (NO RACE VARIABLE) (RUSH/TITUS): 82 ML/MIN/1.73M2
EOSINOPHIL # BLD AUTO: 0.25 K/UL (ref 0–0.5)
EOSINOPHIL NFR BLD AUTO: 1.9 % (ref 1–4)
ERYTHROCYTE [DISTWIDTH] IN BLOOD BY AUTOMATED COUNT: 12.8 % (ref 11.5–14.5)
GLOBULIN SER-MCNC: 2.8 G/DL (ref 2–4)
GLUCOSE SERPL-MCNC: 133 MG/DL (ref 74–106)
GLUCOSE UR STRIP-MCNC: NORMAL MG/DL
HCT VFR BLD AUTO: 31.6 % (ref 38–47)
HGB BLD-MCNC: 10.4 G/DL (ref 12–16)
IMM GRANULOCYTES # BLD AUTO: 0.06 K/UL (ref 0–0.04)
IMM GRANULOCYTES NFR BLD: 0.5 % (ref 0–0.4)
KETONES UR STRIP-SCNC: NEGATIVE MG/DL
LEUKOCYTE ESTERASE UR QL STRIP: ABNORMAL
LYMPHOCYTES # BLD AUTO: 1.09 K/UL (ref 1–4.8)
LYMPHOCYTES NFR BLD AUTO: 8.3 % (ref 27–41)
MCH RBC QN AUTO: 29.1 PG (ref 27–31)
MCHC RBC AUTO-ENTMCNC: 32.9 G/DL (ref 32–36)
MCV RBC AUTO: 88.5 FL (ref 80–96)
MONOCYTES # BLD AUTO: 0.86 K/UL (ref 0–0.8)
MONOCYTES NFR BLD AUTO: 6.5 % (ref 2–6)
MPC BLD CALC-MCNC: 10.2 FL (ref 9.4–12.4)
MUCOUS, UA: ABNORMAL /LPF
NEUTROPHILS # BLD AUTO: 10.85 K/UL (ref 1.8–7.7)
NEUTROPHILS NFR BLD AUTO: 82.5 % (ref 53–65)
NITRITE UR QL STRIP: NEGATIVE
NRBC # BLD AUTO: 0 X10E3/UL
NRBC, AUTO (.00): 0 %
PH UR STRIP: 6 PH UNITS
PLATELET # BLD AUTO: 169 K/UL (ref 150–400)
POTASSIUM SERPL-SCNC: 3.1 MMOL/L (ref 3.5–5.1)
PROT SERPL-MCNC: 5.9 G/DL (ref 6.4–8.2)
PROT UR QL STRIP: 30
RBC # BLD AUTO: 3.57 M/UL (ref 4.2–5.4)
RBC # UR STRIP: NEGATIVE /UL
RBC #/AREA URNS HPF: 5 /HPF
SODIUM SERPL-SCNC: 140 MMOL/L (ref 136–145)
SP GR UR STRIP: 1.03
SQUAMOUS #/AREA URNS LPF: ABNORMAL /HPF
UROBILINOGEN UR STRIP-ACNC: 2 MG/DL
WBC # BLD AUTO: 13.15 K/UL (ref 4.5–11)
WBC #/AREA URNS HPF: >182 /HPF

## 2024-07-27 PROCEDURE — 87186 SC STD MICRODIL/AGAR DIL: CPT | Performed by: EMERGENCY MEDICINE

## 2024-07-27 PROCEDURE — 36415 COLL VENOUS BLD VENIPUNCTURE: CPT | Performed by: EMERGENCY MEDICINE

## 2024-07-27 PROCEDURE — 87077 CULTURE AEROBIC IDENTIFY: CPT | Performed by: EMERGENCY MEDICINE

## 2024-07-27 PROCEDURE — 87086 URINE CULTURE/COLONY COUNT: CPT | Performed by: EMERGENCY MEDICINE

## 2024-07-27 PROCEDURE — 80053 COMPREHEN METABOLIC PANEL: CPT | Performed by: EMERGENCY MEDICINE

## 2024-07-27 PROCEDURE — 85025 COMPLETE CBC W/AUTO DIFF WBC: CPT | Performed by: EMERGENCY MEDICINE

## 2024-07-27 PROCEDURE — 99284 EMERGENCY DEPT VISIT MOD MDM: CPT | Mod: 25

## 2024-07-27 PROCEDURE — 81001 URINALYSIS AUTO W/SCOPE: CPT | Performed by: EMERGENCY MEDICINE

## 2024-07-28 VITALS
BODY MASS INDEX: 19.16 KG/M2 | WEIGHT: 115 LBS | RESPIRATION RATE: 20 BRPM | HEART RATE: 84 BPM | HEIGHT: 65 IN | SYSTOLIC BLOOD PRESSURE: 146 MMHG | OXYGEN SATURATION: 95 % | TEMPERATURE: 98 F | DIASTOLIC BLOOD PRESSURE: 84 MMHG

## 2024-07-28 PROCEDURE — 87040 BLOOD CULTURE FOR BACTERIA: CPT | Performed by: EMERGENCY MEDICINE

## 2024-07-28 PROCEDURE — 96365 THER/PROPH/DIAG IV INF INIT: CPT

## 2024-07-28 PROCEDURE — 36415 COLL VENOUS BLD VENIPUNCTURE: CPT | Performed by: EMERGENCY MEDICINE

## 2024-07-28 PROCEDURE — 25000003 PHARM REV CODE 250: Performed by: EMERGENCY MEDICINE

## 2024-07-28 PROCEDURE — 63600175 PHARM REV CODE 636 W HCPCS: Performed by: EMERGENCY MEDICINE

## 2024-07-28 PROCEDURE — 96361 HYDRATE IV INFUSION ADD-ON: CPT

## 2024-07-28 RX ORDER — CEFDINIR 300 MG/1
300 CAPSULE ORAL 2 TIMES DAILY
Qty: 20 CAPSULE | Refills: 0 | Status: SHIPPED | OUTPATIENT
Start: 2024-07-28 | End: 2024-07-28

## 2024-07-28 RX ORDER — SYRING-NEEDL,DISP,INSUL,0.3 ML 29 G X1/2"
296 SYRINGE, EMPTY DISPOSABLE MISCELLANEOUS
Status: DISCONTINUED | OUTPATIENT
Start: 2024-07-28 | End: 2024-07-28 | Stop reason: HOSPADM

## 2024-07-28 RX ORDER — CEFDINIR 300 MG/1
300 CAPSULE ORAL 2 TIMES DAILY
Qty: 20 CAPSULE | Refills: 0 | Status: ON HOLD | OUTPATIENT
Start: 2024-07-28 | End: 2024-07-31

## 2024-07-28 RX ORDER — SODIUM CHLORIDE, SODIUM LACTATE, POTASSIUM CHLORIDE, CALCIUM CHLORIDE 600; 310; 30; 20 MG/100ML; MG/100ML; MG/100ML; MG/100ML
1000 INJECTION, SOLUTION INTRAVENOUS
Status: COMPLETED | OUTPATIENT
Start: 2024-07-28 | End: 2024-07-28

## 2024-07-28 RX ORDER — PSEUDOEPHEDRINE/ACETAMINOPHEN 30MG-500MG
100 TABLET ORAL
Status: DISCONTINUED | OUTPATIENT
Start: 2024-07-28 | End: 2024-07-28 | Stop reason: HOSPADM

## 2024-07-28 RX ORDER — CEFTRIAXONE 1 G/1
1 INJECTION, POWDER, FOR SOLUTION INTRAMUSCULAR; INTRAVENOUS
Status: DISCONTINUED | OUTPATIENT
Start: 2024-07-28 | End: 2024-07-28

## 2024-07-28 RX ADMIN — SODIUM CHLORIDE, POTASSIUM CHLORIDE, SODIUM LACTATE AND CALCIUM CHLORIDE 1000 ML: 600; 310; 30; 20 INJECTION, SOLUTION INTRAVENOUS at 12:07

## 2024-07-28 RX ADMIN — CEFTRIAXONE SODIUM 1 G: 1 INJECTION, POWDER, FOR SOLUTION INTRAMUSCULAR; INTRAVENOUS at 01:07

## 2024-07-28 NOTE — ED PROVIDER NOTES
Encounter Date: 7/27/2024    SCRIBE #1 NOTE: I, Misty Marti, am scribing for, and in the presence of,  Maury Justice MD.       History     Chief Complaint   Patient presents with    Cough    Dysuria    Constipation     Family reports that patient started having a productive cough on Thursday with thick yellow sputum. Her urine is concentrated and has a bad smell. They report the patient grabs herself when she urinated like it is hurting. She was disimpacted yesterday by caretakers but they feel she is still constipated. Pt has advanced dementia.      This pt is a 79 y/o Female that presents to the ED with c/o Cough ,Dysuria, and Constipation. The pt is none verbal and can't express in detail the severity of her pain, when and if she does have pain. The pt's family reports the pt having a productive cough with thick yellow sputum. The family thinks she has a UTI, because when asked where the pain is she uses gestures to show where the pain is located. They report the patient grabs herself when she urinated like it is hurting. She was disimpacted yesterday by caretakers but they feel she is still constipated. The family thinks she may still be constipated, because this morning around 08:00 she passed stool and it was very hard. Pt has Mhx of advanced dementia. There are on other issues to report with this pt at this time.       The history is provided by a relative and a caregiver. No  was used.     Review of patient's allergies indicates:   Allergen Reactions    Sulfa (sulfonamide antibiotics) Anaphylaxis    Codeine Nausea Only     Past Medical History:   Diagnosis Date    Achrochordon     Aphasia as late effect of cerebrovascular accident     Cervicalgia     Diabetes mellitus, type 2     Hiatal hernia with GERD     Insomnia secondary to depression with anxiety     Mixed hyperlipidemia 03/02/2022    Primary hypertension 03/02/2022    Senile dementia with behavioral disturbance     Sleep  related leg cramps     Stroke     TMJ (dislocation of temporomandibular joint)     Vascular dementia with behavior disturbance 03/02/2022    Vitamin B 12 deficiency     Vitamin D deficiency      Past Surgical History:   Procedure Laterality Date    BREAST BIOPSY      rt.core biopsy    carotid dopple complete      CHOLECYSTECTOMY      COLONOSCOPY  01/10/2017    repeat in 5 years    ESOPHAGOGASTRODUODENOSCOPY  11/29/2016    HYSTERECTOMY       Family History   Problem Relation Name Age of Onset    Hypertension Mother      Hyperlipidemia Mother      Heart disease Father      Hypertension Father      Emphysema Father       Social History     Tobacco Use    Smoking status: Never     Passive exposure: Never    Smokeless tobacco: Never   Substance Use Topics    Alcohol use: Never    Drug use: Never     Review of Systems   Constitutional:  Negative for fever.   Respiratory:  Positive for cough.    Gastrointestinal:  Positive for constipation. Negative for nausea and vomiting.   Genitourinary:  Positive for dysuria.   All other systems reviewed and are negative.      Physical Exam     Initial Vitals [07/27/24 2027]   BP Pulse Resp Temp SpO2   106/68 86 20 98.2 °F (36.8 °C) 95 %      MAP       --         Physical Exam    Nursing note and vitals reviewed.  Constitutional: She appears well-developed and well-nourished.   HENT:   Head: Normocephalic and atraumatic.   Eyes: EOM are normal. Pupils are equal, round, and reactive to light.   Neck: Neck supple. No thyromegaly present.   Normal range of motion.  Cardiovascular:  Normal rate, regular rhythm, normal heart sounds and intact distal pulses.           No murmur heard.  Pulmonary/Chest: Breath sounds normal. She has no wheezes.   Abdominal: Abdomen is soft. Bowel sounds are normal. There is no abdominal tenderness.   Musculoskeletal:         General: No tenderness or edema. Normal range of motion.      Cervical back: Normal range of motion and neck supple.      Lymphadenopathy:     She has no cervical adenopathy.   Neurological: She is alert and oriented to person, place, and time. She has normal strength and normal reflexes. No cranial nerve deficit or sensory deficit. GCS score is 15. GCS eye subscore is 4. GCS verbal subscore is 5. GCS motor subscore is 6.   Skin: Skin is warm and dry. Capillary refill takes less than 2 seconds. No rash noted.   Psychiatric: She has a normal mood and affect.         ED Course   Procedures  Labs Reviewed   URINALYSIS, REFLEX TO URINE CULTURE - Abnormal       Result Value    Color, UA Yellow      Clarity, UA Turbid      pH, UA 6.0      Leukocytes, UA Large (*)     Nitrites, UA Negative      Protein, UA 30 (*)     Glucose, UA Normal      Ketones, UA Negative      Urobilinogen, UA 2 (*)     Bilirubin, UA Negative      Blood, UA Negative      Specific Gravity, UA 1.030     URINALYSIS, MICROSCOPIC - Abnormal    WBC, UA >182 (*)     RBC, UA 5 (*)     Bacteria, UA Moderate (*)     Squamous Epithelial Cells, UA Occasional (*)     Amorphous Crystals, UA Few (*)     Calcium Oxalate Crystals, UA Occasional (*)     Mucous Few (*)    COMPREHENSIVE METABOLIC PANEL - Abnormal    Sodium 140      Potassium 3.1 (*)     Chloride 107      CO2 29      Anion Gap 7      Glucose 133 (*)     BUN 27 (*)     Creatinine 0.75      BUN/Creatinine Ratio 36 (*)     Calcium 8.6      Total Protein 5.9 (*)     Albumin 3.1 (*)     Globulin 2.8      A/G Ratio 1.1      Bilirubin, Total 0.3      Alk Phos 120      ALT 19      AST 14 (*)     eGFR 82     CBC WITH DIFFERENTIAL - Abnormal    WBC 13.15 (*)     RBC 3.57 (*)     Hemoglobin 10.4 (*)     Hematocrit 31.6 (*)     MCV 88.5      MCH 29.1      MCHC 32.9      RDW 12.8      Platelet Count 169      MPV 10.2      Neutrophils % 82.5 (*)     Lymphocytes % 8.3 (*)     Monocytes % 6.5 (*)     Eosinophils % 1.9      Basophils % 0.3      Immature Granulocytes % 0.5 (*)     nRBC, Auto 0.0      Neutrophils, Abs 10.85 (*)      Lymphocytes, Absolute 1.09      Monocytes, Absolute 0.86 (*)     Eosinophils, Absolute 0.25      Basophils, Absolute 0.04      Immature Granulocytes, Absolute 0.06 (*)     nRBC, Absolute 0.00      Diff Type Auto     CULTURE, URINE    Culture, Urine No Growth To Date     CULTURE, BLOOD   CULTURE, BLOOD   CBC W/ AUTO DIFFERENTIAL    Narrative:     The following orders were created for panel order CBC auto differential.  Procedure                               Abnormality         Status                     ---------                               -----------         ------                     CBC with Differential[[id number]]       Abnormal            Final result                 Please view results for these tests on the individual orders.          Imaging Results              XR ABDOMEN, ACUTE 2 OR MORE VIEWS WITH CHEST (Final result)  Result time 07/28/24 08:08:36   Procedure changed from X-Ray Abdomen Flat And Erect     Final result by Galen Dickerson MD (07/28/24 08:08:36)                   Impression:      Mild colonic stool burden.      Electronically signed by: Galen Dickerson  Date:    07/28/2024  Time:    08:08               Narrative:    EXAMINATION:  XR ABDOMEN ACUTE 2 OR MORE VIEWS WITH CHEST    CLINICAL HISTORY:  pain;  Constipation, unspecified    TECHNIQUE:  PA chest radiograph, supine and erect views of the abdomen    COMPARISON:  None    FINDINGS:  Lungs clear.  Bowel gas pattern normal.  Mild colonic stool burden.  Surgical clips right upper quadrant.  Calcifications in the left upper quadrant are again seen and similar possibly renal related.  No pneumoperitoneum.                                       Medications   lactated ringers infusion (0 mLs Intravenous Stopped 7/28/24 0353)   cefTRIAXone (Rocephin) 1 g in D5W 100 mL IVPB (MB+) (0 g Intravenous Stopped 7/28/24 0130)     Medical Decision Making  Amount and/or Complexity of Data Reviewed  Labs: ordered.  Radiology: ordered.    Risk  Prescription drug  management.    MDM:  A 78 year old female patient who has history of dementia is brought to the emergency department because of constipation and UTI.  The patient was severely constipated and had to be disimpacted with enema        Attending Attestation:           Physician Attestation for Scribe:  Physician Attestation Statement for Scribe #1: I, Maury Justice MD, reviewed documentation, as scribed by Misty Marti in my presence, and it is both accurate and complete.                                    Clinical Impression:  Final diagnoses:  [K59.00] Constipation  [R05.9] Cough  [N39.0, R31.9] Urinary tract infection with hematuria, site unspecified (Primary)          ED Disposition Condition    Discharge Stable          ED Prescriptions       Medication Sig Dispense Start Date End Date Auth. Provider    cefdinir (OMNICEF) 300 MG capsule  (Status: Discontinued) Take 1 capsule (300 mg total) by mouth 2 (two) times daily. for 10 days 20 capsule 7/28/2024 7/28/2024 Maury Justice MD    cefdinir (OMNICEF) 300 MG capsule Take 1 capsule (300 mg total) by mouth 2 (two) times daily. for 10 days 20 capsule 7/28/2024 8/7/2024 Maury Justice MD          Follow-up Information       Follow up With Specialties Details Why Contact Info    Jane Amor MD Family Medicine In 3 days If symptoms worsen [address] [facility] [address] [phone]               Maury Justice MD  07/29/24 [id number]

## 2024-07-28 NOTE — ED NOTES
Digital rectal exam performed and large amount of very firm stool present in the rectal vault;  digital removal of stool that was readily accessible;  we then attempted to administer brown bomb enema protocol - pt was only able to tolerate approx 450cc of solution  she was then assisted to BSC but only results was about half of amount administered;  pt then assisted back into bed and new brief applied

## 2024-07-30 ENCOUNTER — TELEPHONE (OUTPATIENT)
Dept: EMERGENCY MEDICINE | Facility: HOSPITAL | Age: 79
End: 2024-07-30
Payer: MEDICARE

## 2024-07-30 LAB — UA COMPLETE W REFLEX CULTURE PNL UR: ABNORMAL

## 2024-07-30 RX ORDER — AMOXICILLIN 500 MG/1
500 CAPSULE ORAL 3 TIMES DAILY
Qty: 30 CAPSULE | Refills: 0 | Status: ON HOLD | OUTPATIENT
Start: 2024-07-30 | End: 2024-07-31

## 2024-07-30 NOTE — TELEPHONE ENCOUNTER
----- Message from MASON Arango sent at 7/30/2024  8:39 AM CDT -----  Please notify the patient of RX

## 2024-07-31 ENCOUNTER — OFFICE VISIT (OUTPATIENT)
Dept: FAMILY MEDICINE | Facility: CLINIC | Age: 79
End: 2024-07-31
Payer: MEDICARE

## 2024-07-31 ENCOUNTER — EXTERNAL HOME HEALTH (OUTPATIENT)
Dept: HOME HEALTH SERVICES | Facility: HOSPITAL | Age: 79
End: 2024-07-31

## 2024-07-31 ENCOUNTER — HOSPITAL ENCOUNTER (INPATIENT)
Facility: HOSPITAL | Age: 79
LOS: 3 days | Discharge: NURSING, REHAB OR LONG-TERM CARE | DRG: 689 | End: 2024-08-03
Attending: STUDENT IN AN ORGANIZED HEALTH CARE EDUCATION/TRAINING PROGRAM | Admitting: STUDENT IN AN ORGANIZED HEALTH CARE EDUCATION/TRAINING PROGRAM
Payer: MEDICARE

## 2024-07-31 VITALS
HEART RATE: 94 BPM | DIASTOLIC BLOOD PRESSURE: 60 MMHG | TEMPERATURE: 99 F | OXYGEN SATURATION: 95 % | RESPIRATION RATE: 18 BRPM | SYSTOLIC BLOOD PRESSURE: 100 MMHG | HEIGHT: 65 IN | BODY MASS INDEX: 18.83 KG/M2 | WEIGHT: 113 LBS

## 2024-07-31 DIAGNOSIS — R53.83 LETHARGY: ICD-10-CM

## 2024-07-31 DIAGNOSIS — R32 URINARY INCONTINENCE, UNSPECIFIED TYPE: ICD-10-CM

## 2024-07-31 DIAGNOSIS — A41.9 SEPSIS: ICD-10-CM

## 2024-07-31 DIAGNOSIS — E87.6 HYPOKALEMIA: ICD-10-CM
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
## 2024-07-31 DIAGNOSIS — G93.41 ENCEPHALOPATHY, METABOLIC: Primary | ICD-10-CM

## 2024-07-31 DIAGNOSIS — K59.00 CONSTIPATION, UNSPECIFIED CONSTIPATION TYPE: ICD-10-CM

## 2024-07-31 DIAGNOSIS — Z78.9 MEDICATION INTOLERANCE: ICD-10-CM

## 2024-07-31 DIAGNOSIS — D64.9 ANEMIA, UNSPECIFIED TYPE: ICD-10-CM

## 2024-07-31 DIAGNOSIS — R63.0 POOR APPETITE: ICD-10-CM

## 2024-07-31 DIAGNOSIS — N39.0 URINARY TRACT INFECTION WITHOUT HEMATURIA, SITE UNSPECIFIED: Primary | ICD-10-CM

## 2024-07-31 DIAGNOSIS — R07.9 CHEST PAIN: ICD-10-CM

## 2024-07-31 PROBLEM — E78.2 MIXED HYPERLIPIDEMIA: Status: ACTIVE | Noted: 2022-03-02

## 2024-07-31 PROBLEM — I10 PRIMARY HYPERTENSION: Status: ACTIVE | Noted: 2022-03-02

## 2024-07-31 PROBLEM — F03.918 DEMENTIA WITH BEHAVIORAL DISTURBANCE: Status: ACTIVE | Noted: 2024-07-31

## 2024-07-31 PROBLEM — E43 SEVERE PROTEIN-CALORIE MALNUTRITION: Status: ACTIVE | Noted: 2024-07-31

## 2024-07-31 LAB
ALBUMIN SERPL BCP-MCNC: 3.3 G/DL (ref 3.5–5)
ALBUMIN/GLOB SERPL: 0.7 {RATIO}
ALP SERPL-CCNC: 154 U/L (ref 55–142)
ALT SERPL W P-5'-P-CCNC: 18 U/L (ref 13–56)
ANION GAP SERPL CALCULATED.3IONS-SCNC: 8 MMOL/L (ref 7–16)
AST SERPL W P-5'-P-CCNC: 15 U/L (ref 15–37)
BASOPHILS # BLD AUTO: 0.05 K/UL (ref 0–0.2)
BASOPHILS NFR BLD AUTO: 0.3 % (ref 0–1)
BILIRUB SERPL-MCNC: 0.6 MG/DL (ref ?–1.2)
BUN SERPL-MCNC: 14 MG/DL (ref 7–18)
BUN/CREAT SERPL: 20 (ref 6–20)
CALCIUM SERPL-MCNC: 9.5 MG/DL (ref 8.5–10.1)
CHLORIDE SERPL-SCNC: 101 MMOL/L (ref 98–107)
CO2 SERPL-SCNC: 29 MMOL/L (ref 21–32)
CREAT SERPL-MCNC: 0.7 MG/DL (ref 0.55–1.02)
CTP QC/QA: YES
DIFFERENTIAL METHOD BLD: ABNORMAL
EGFR (NO RACE VARIABLE) (RUSH/TITUS): 89 ML/MIN/1.73M2
EOSINOPHIL # BLD AUTO: 0.01 K/UL (ref 0–0.5)
EOSINOPHIL NFR BLD AUTO: 0.1 % (ref 1–4)
ERYTHROCYTE [DISTWIDTH] IN BLOOD BY AUTOMATED COUNT: 12.5 % (ref 11.5–14.5)
GLOBULIN SER-MCNC: 4.8 G/DL (ref 2–4)
GLUCOSE SERPL-MCNC: 130 MG/DL (ref 74–106)
HCT VFR BLD AUTO: 37.1 % (ref 38–47)
HGB BLD-MCNC: 12.3 G/DL (ref 12–16)
IMM GRANULOCYTES # BLD AUTO: 0.04 K/UL (ref 0–0.04)
IMM GRANULOCYTES NFR BLD: 0.2 % (ref 0–0.4)
LYMPHOCYTES # BLD AUTO: 0.95 K/UL (ref 1–4.8)
LYMPHOCYTES NFR BLD AUTO: 5.9 % (ref 27–41)
MCH RBC QN AUTO: 29.4 PG (ref 27–31)
MCHC RBC AUTO-ENTMCNC: 33.2 G/DL (ref 32–36)
MCV RBC AUTO: 88.5 FL (ref 80–96)
MONOCYTES # BLD AUTO: 0.9 K/UL (ref 0–0.8)
MONOCYTES NFR BLD AUTO: 5.6 % (ref 2–6)
MPC BLD CALC-MCNC: 9.9 FL (ref 9.4–12.4)
NEUTROPHILS # BLD AUTO: 14.11 K/UL (ref 1.8–7.7)
NEUTROPHILS NFR BLD AUTO: 87.9 % (ref 53–65)
NRBC # BLD AUTO: 0 X10E3/UL
NRBC, AUTO (.00): 0 %
PLATELET # BLD AUTO: 214 K/UL (ref 150–400)
POTASSIUM SERPL-SCNC: 3.5 MMOL/L (ref 3.5–5.1)
PROT SERPL-MCNC: 8.1 G/DL (ref 6.4–8.2)
RBC # BLD AUTO: 4.19 M/UL (ref 4.2–5.4)
SARS-COV-2 RDRP RESP QL NAA+PROBE: NEGATIVE
SODIUM SERPL-SCNC: 134 MMOL/L (ref 136–145)
TSH SERPL DL<=0.005 MIU/L-ACNC: 0.45 UIU/ML (ref 0.36–3.74)
WBC # BLD AUTO: 16.06 K/UL (ref 4.5–11)

## 2024-07-31 PROCEDURE — 85025 COMPLETE CBC W/AUTO DIFF WBC: CPT | Performed by: STUDENT IN AN ORGANIZED HEALTH CARE EDUCATION/TRAINING PROGRAM

## 2024-07-31 PROCEDURE — 25000242 PHARM REV CODE 250 ALT 637 W/ HCPCS: Performed by: STUDENT IN AN ORGANIZED HEALTH CARE EDUCATION/TRAINING PROGRAM

## 2024-07-31 PROCEDURE — 36415 COLL VENOUS BLD VENIPUNCTURE: CPT | Performed by: STUDENT IN AN ORGANIZED HEALTH CARE EDUCATION/TRAINING PROGRAM

## 2024-07-31 PROCEDURE — 99900035 HC TECH TIME PER 15 MIN (STAT)

## 2024-07-31 PROCEDURE — 63600175 PHARM REV CODE 636 W HCPCS: Performed by: INTERNAL MEDICINE

## 2024-07-31 PROCEDURE — 84443 ASSAY THYROID STIM HORMONE: CPT | Performed by: STUDENT IN AN ORGANIZED HEALTH CARE EDUCATION/TRAINING PROGRAM

## 2024-07-31 PROCEDURE — 87040 BLOOD CULTURE FOR BACTERIA: CPT | Performed by: STUDENT IN AN ORGANIZED HEALTH CARE EDUCATION/TRAINING PROGRAM

## 2024-07-31 PROCEDURE — 99213 OFFICE O/P EST LOW 20 MIN: CPT | Mod: ,,, | Performed by: NURSE PRACTITIONER

## 2024-07-31 PROCEDURE — 80053 COMPREHEN METABOLIC PANEL: CPT | Performed by: STUDENT IN AN ORGANIZED HEALTH CARE EDUCATION/TRAINING PROGRAM

## 2024-07-31 PROCEDURE — 11000001 HC ACUTE MED/SURG PRIVATE ROOM

## 2024-07-31 PROCEDURE — 99223 1ST HOSP IP/OBS HIGH 75: CPT | Mod: AI,,, | Performed by: STUDENT IN AN ORGANIZED HEALTH CARE EDUCATION/TRAINING PROGRAM

## 2024-07-31 PROCEDURE — 63600175 PHARM REV CODE 636 W HCPCS: Performed by: STUDENT IN AN ORGANIZED HEALTH CARE EDUCATION/TRAINING PROGRAM

## 2024-07-31 PROCEDURE — 87635 SARS-COV-2 COVID-19 AMP PRB: CPT | Mod: QW,,, | Performed by: NURSE PRACTITIONER

## 2024-07-31 PROCEDURE — 94640 AIRWAY INHALATION TREATMENT: CPT

## 2024-07-31 PROCEDURE — 25000003 PHARM REV CODE 250: Performed by: STUDENT IN AN ORGANIZED HEALTH CARE EDUCATION/TRAINING PROGRAM

## 2024-07-31 RX ORDER — SODIUM CHLORIDE 9 MG/ML
INJECTION, SOLUTION INTRAVENOUS
Status: DISCONTINUED | OUTPATIENT
Start: 2024-07-31 | End: 2024-08-03 | Stop reason: HOSPADM

## 2024-07-31 RX ORDER — PANTOPRAZOLE SODIUM 40 MG/1
40 TABLET, DELAYED RELEASE ORAL DAILY
Status: DISCONTINUED | OUTPATIENT
Start: 2024-08-01 | End: 2024-08-03 | Stop reason: HOSPADM

## 2024-07-31 RX ORDER — SODIUM CHLORIDE 0.9 % (FLUSH) 0.9 %
10 SYRINGE (ML) INJECTION EVERY 12 HOURS PRN
Status: DISCONTINUED | OUTPATIENT
Start: 2024-07-31 | End: 2024-08-03 | Stop reason: HOSPADM

## 2024-07-31 RX ORDER — ATORVASTATIN CALCIUM 40 MG/1
40 TABLET, FILM COATED ORAL DAILY
Status: DISCONTINUED | OUTPATIENT
Start: 2024-08-01 | End: 2024-08-03 | Stop reason: HOSPADM

## 2024-07-31 RX ORDER — CITALOPRAM 20 MG/1
20 TABLET, FILM COATED ORAL DAILY
Status: DISCONTINUED | OUTPATIENT
Start: 2024-08-01 | End: 2024-08-03 | Stop reason: HOSPADM

## 2024-07-31 RX ORDER — MEMANTINE HYDROCHLORIDE 10 MG/1
10 TABLET ORAL 2 TIMES DAILY
Status: DISCONTINUED | OUTPATIENT
Start: 2024-07-31 | End: 2024-08-03 | Stop reason: HOSPADM

## 2024-07-31 RX ORDER — DONEPEZIL HYDROCHLORIDE 5 MG/1
10 TABLET, FILM COATED ORAL NIGHTLY
Status: DISCONTINUED | OUTPATIENT
Start: 2024-07-31 | End: 2024-08-03 | Stop reason: HOSPADM

## 2024-07-31 RX ORDER — SODIUM CHLORIDE 9 MG/ML
INJECTION, SOLUTION INTRAVENOUS CONTINUOUS
Status: DISPENSED | OUTPATIENT
Start: 2024-07-31 | End: 2024-08-01

## 2024-07-31 RX ORDER — AMOXICILLIN 250 MG
1 CAPSULE ORAL 2 TIMES DAILY
Status: DISCONTINUED | OUTPATIENT
Start: 2024-07-31 | End: 2024-08-03 | Stop reason: HOSPADM

## 2024-07-31 RX ORDER — IBUPROFEN 200 MG
24 TABLET ORAL
Status: DISCONTINUED | OUTPATIENT
Start: 2024-07-31 | End: 2024-08-03 | Stop reason: HOSPADM

## 2024-07-31 RX ORDER — POLYETHYLENE GLYCOL 3350 17 G/17G
34 POWDER, FOR SOLUTION ORAL 2 TIMES DAILY
Status: DISCONTINUED | OUTPATIENT
Start: 2024-07-31 | End: 2024-08-03 | Stop reason: HOSPADM

## 2024-07-31 RX ORDER — HEPARIN SODIUM 5000 [USP'U]/ML
5000 INJECTION, SOLUTION INTRAVENOUS; SUBCUTANEOUS EVERY 8 HOURS
Status: DISCONTINUED | OUTPATIENT
Start: 2024-07-31 | End: 2024-08-03 | Stop reason: HOSPADM

## 2024-07-31 RX ORDER — LEVOFLOXACIN 5 MG/ML
750 INJECTION, SOLUTION INTRAVENOUS
Status: DISCONTINUED | OUTPATIENT
Start: 2024-07-31 | End: 2024-08-02

## 2024-07-31 RX ORDER — TRAZODONE HYDROCHLORIDE 150 MG/1
150 TABLET ORAL NIGHTLY
Status: DISCONTINUED | OUTPATIENT
Start: 2024-07-31 | End: 2024-08-03 | Stop reason: HOSPADM

## 2024-07-31 RX ORDER — ACETYLCYSTEINE 200 MG/ML
4 SOLUTION ORAL; RESPIRATORY (INHALATION) 2 TIMES DAILY
Status: DISCONTINUED | OUTPATIENT
Start: 2024-07-31 | End: 2024-08-03 | Stop reason: HOSPADM

## 2024-07-31 RX ORDER — IPRATROPIUM BROMIDE AND ALBUTEROL SULFATE 2.5; .5 MG/3ML; MG/3ML
3 SOLUTION RESPIRATORY (INHALATION) EVERY 6 HOURS PRN
Status: DISCONTINUED | OUTPATIENT
Start: 2024-07-31 | End: 2024-08-03 | Stop reason: HOSPADM

## 2024-07-31 RX ORDER — FLUTICASONE PROPIONATE 50 MCG
1 SPRAY, SUSPENSION (ML) NASAL DAILY
Status: DISCONTINUED | OUTPATIENT
Start: 2024-08-01 | End: 2024-08-03 | Stop reason: HOSPADM

## 2024-07-31 RX ORDER — POTASSIUM CHLORIDE 20 MEQ/1
40 TABLET, EXTENDED RELEASE ORAL ONCE
Status: COMPLETED | OUTPATIENT
Start: 2024-07-31 | End: 2024-07-31

## 2024-07-31 RX ORDER — TRAMADOL HYDROCHLORIDE 50 MG/1
50 TABLET ORAL EVERY 6 HOURS PRN
Status: DISCONTINUED | OUTPATIENT
Start: 2024-07-31 | End: 2024-08-03 | Stop reason: HOSPADM

## 2024-07-31 RX ORDER — NALOXONE HCL 0.4 MG/ML
0.02 VIAL (ML) INJECTION
Status: DISCONTINUED | OUTPATIENT
Start: 2024-07-31 | End: 2024-08-03 | Stop reason: HOSPADM

## 2024-07-31 RX ORDER — ZIPRASIDONE MESYLATE 20 MG/ML
10 INJECTION, POWDER, LYOPHILIZED, FOR SOLUTION INTRAMUSCULAR ONCE
Status: COMPLETED | OUTPATIENT
Start: 2024-07-31 | End: 2024-07-31

## 2024-07-31 RX ORDER — IBUPROFEN 200 MG
16 TABLET ORAL
Status: DISCONTINUED | OUTPATIENT
Start: 2024-07-31 | End: 2024-08-03 | Stop reason: HOSPADM

## 2024-07-31 RX ORDER — BUSPIRONE HYDROCHLORIDE 5 MG/1
10 TABLET ORAL DAILY
Status: DISCONTINUED | OUTPATIENT
Start: 2024-08-01 | End: 2024-08-03 | Stop reason: HOSPADM

## 2024-07-31 RX ORDER — GLUCAGON 1 MG
1 KIT INJECTION
Status: DISCONTINUED | OUTPATIENT
Start: 2024-07-31 | End: 2024-08-03 | Stop reason: HOSPADM

## 2024-07-31 RX ADMIN — POTASSIUM CHLORIDE 40 MEQ: 1500 TABLET, EXTENDED RELEASE ORAL at 05:07

## 2024-07-31 RX ADMIN — SENNOSIDES, DOCUSATE SODIUM 1 TABLET: 8.6; 5 TABLET ORAL at 09:07

## 2024-07-31 RX ADMIN — SODIUM CHLORIDE: 9 INJECTION, SOLUTION INTRAVENOUS at 06:07

## 2024-07-31 RX ADMIN — DONEPEZIL HYDROCHLORIDE 10 MG: 5 TABLET ORAL at 08:07

## 2024-07-31 RX ADMIN — MEMANTINE 10 MG: 10 TABLET ORAL at 08:07

## 2024-07-31 RX ADMIN — TRAZODONE HYDROCHLORIDE 150 MG: 150 TABLET ORAL at 08:07

## 2024-07-31 RX ADMIN — LEVOFLOXACIN 750 MG: 750 INJECTION, SOLUTION INTRAVENOUS at 06:07

## 2024-07-31 RX ADMIN — ZIPRASIDONE MESYLATE 10 MG: 20 INJECTION, POWDER, LYOPHILIZED, FOR SOLUTION INTRAMUSCULAR at 08:07

## 2024-07-31 RX ADMIN — POLYETHYLENE GLYCOL 3350 34 G: 17 POWDER, FOR SOLUTION ORAL at 08:07

## 2024-07-31 RX ADMIN — ACETYLCYSTEINE 4 ML: 200 SOLUTION ORAL; RESPIRATORY (INHALATION) at 07:07

## 2024-07-31 RX ADMIN — HEPARIN SODIUM 5000 UNITS: 5000 INJECTION, SOLUTION INTRAVENOUS; SUBCUTANEOUS at 08:07

## 2024-07-31 NOTE — PROGRESS NOTES
MASON Bunch   [facility]  [address] [facility] [id number]  [phone]      PATIENT NAME: Jane Clark  : 1945  DATE: 24  MRN: [id number]      Billing Provider: MASON Bunch  Level of Service:   Patient PCP Information       Provider PCP Type    Didi Singer NP General            Reason for Visit / Chief Complaint: Constipation (Constipation and fatigue)       Update PCP  Update Chief Complaint         History of Present Illness / Problem Focused Workflow     Jane Clark presents to the clinic with Constipation (Constipation and fatigue)     Patient brought to clinic today, brought by her daughter, for c/o increased confusion, refusal to take PO antibiotics for UTI, decreased oral intake of fluids and decreased bowel movements. Daughter has noted more confusion than baseline also. She was seen in the ED four days ago for same.         Review of Systems     Review of Systems   Constitutional:  Positive for chills. Negative for fever.   HENT:  Positive for nasal congestion. Negative for sore throat and trouble swallowing.    Respiratory:  Positive for cough. Negative for choking, chest tightness and shortness of breath.    Cardiovascular:  Negative for chest pain, palpitations, leg swelling and claudication.   Gastrointestinal:  Positive for constipation, reflux and fecal incontinence. Negative for abdominal distention, abdominal pain, blood in stool, change in bowel habit, diarrhea, nausea and vomiting.   Genitourinary:  Positive for bladder incontinence.   Musculoskeletal:  Negative for arthralgias.   Integumentary:  Negative for rash.   Neurological:  Positive for memory loss. Negative for seizures and facial asymmetry.        Medical / Social / Family History     Past Medical History:   Diagnosis Date    Achrochordon     Aphasia as late effect of cerebrovascular accident     Cervicalgia     Diabetes mellitus, type 2      Hiatal hernia with GERD     Insomnia secondary to depression with anxiety     Mixed hyperlipidemia 03/02/2022    Primary hypertension 03/02/2022    Senile dementia with behavioral disturbance     Sleep related leg cramps     Stroke     TMJ (dislocation of temporomandibular joint)     Vascular dementia with behavior disturbance 03/02/2022    Vitamin B 12 deficiency     Vitamin D deficiency        Past Surgical History:   Procedure Laterality Date    BREAST BIOPSY      rt.core biopsy    carotid dopple complete      CHOLECYSTECTOMY      COLONOSCOPY  01/10/2017    repeat in 5 years    ESOPHAGOGASTRODUODENOSCOPY  11/29/2016    HYSTERECTOMY         Social History  Ms. Clark  reports that she has never smoked. She has never been exposed to tobacco smoke. She has never used smokeless tobacco. She reports that she does not drink alcohol and does not use drugs.    Family History  Ms. Clark's family history includes Emphysema in her father; Heart disease in her father; Hyperlipidemia in her mother; Hypertension in her father and mother.    Medications and Allergies     Medications  Outpatient Medications Marked as Taking for the 7/31/24 encounter (Office Visit) with Yuki Field FNP   Medication Sig Dispense Refill    atorvastatin (LIPITOR) 40 MG tablet Take 1 tablet (40 mg total) by mouth once daily. 90 tablet 3    busPIRone (BUSPAR) 5 MG Tab Take 2 tablets daily at noon 180 tablet 3    citalopram (CELEXA) 40 MG tablet TAKE 1 TABLET ONCE DAILY 90 tablet 3    donepeziL (ARICEPT) 10 MG tablet TAKE 1 TABLET EVERY EVENING 90 tablet 3    fluticasone propionate (FLONASE) 50 mcg/actuation nasal spray 1 spray (50 mcg total) by Each Nostril route once daily. 48 g 3    memantine (NAMENDA) 10 MG Tab TAKE 1 TABLET TWICE A  tablet 3    pantoprazole (PROTONIX) 40 MG tablet Take 1 tablet (40 mg total) by mouth once daily. 30 tablet 11    traMADoL (ULTRAM) 50 mg tablet Take 1 tablet (50 mg total) by mouth every 6 (six) hours as  needed for Pain. 12 tablet 0    traZODone (DESYREL) 50 MG tablet TAKE 3 TABLETS (150MG      TOTAL) EVERY EVENING 270 tablet 3    [DISCONTINUED] amoxicillin (AMOXIL) 500 MG capsule Take 1 capsule (500 mg total) by mouth 3 (three) times daily. for 10 days 30 capsule 0    [DISCONTINUED] cefdinir (OMNICEF) 300 MG capsule Take 1 capsule (300 mg total) by mouth 2 (two) times daily. for 10 days 20 capsule 0    [DISCONTINUED] ibuprofen (ADVIL,MOTRIN) 800 MG tablet Take 1 tablet (800 mg total) by mouth every 6 (six) hours as needed for Pain. 20 tablet 0    [DISCONTINUED] meclizine (ANTIVERT) 25 mg tablet Take 1 tablet (25 mg total) by mouth 3 (three) times daily as needed for Dizziness or Nausea. 270 tablet 0    [DISCONTINUED] ondansetron (ZOFRAN) 4 MG tablet Take 1 tablet (4 mg total) by mouth every 6 (six) hours. 12 tablet 0       Allergies  Review of patient's allergies indicates:   Allergen Reactions    Sulfa (sulfonamide antibiotics) Anaphylaxis    Codeine Nausea Only       Physical Examination     Vitals:    07/31/24 1311   BP: 100/60   Pulse: 94   Resp: 18   Temp: 98.6 °F (37 °C)     Physical Exam  Vitals and nursing note reviewed.   Constitutional:       General: She is not in acute distress.     Appearance: Normal appearance. She is ill-appearing. She is not toxic-appearing or diaphoretic.   HENT:      Head: Normocephalic and atraumatic.      Nose: Nose normal.      Mouth/Throat:      Mouth: Mucous membranes are dry.   Eyes:      General: No scleral icterus.     Conjunctiva/sclera: Conjunctivae normal.   Cardiovascular:      Rate and Rhythm: Normal rate and regular rhythm.      Pulses: Normal pulses.      Heart sounds: Normal heart sounds. No murmur heard.  Pulmonary:      Effort: Pulmonary effort is normal. No respiratory distress.      Breath sounds: Normal breath sounds.   Abdominal:      General: Bowel sounds are normal. There is no distension.      Palpations: Abdomen is soft.      Tenderness: There is no  abdominal tenderness. There is no guarding.   Musculoskeletal:      Cervical back: Normal range of motion.      Right lower leg: No edema.      Left lower leg: No edema.   Skin:     General: Skin is warm and dry.      Capillary Refill: Capillary refill takes less than 2 seconds.      Coloration: Skin is pale. Skin is not jaundiced.   Neurological:      Mental Status: She is alert. She is disoriented.      Comments: Ambulates without difficulty   Psychiatric:         Mood and Affect: Mood normal.      Comments: Slightly lethargic, responds to verbal stimulus, garbled speech           Lab Results   Component Value Date    WBC 13.15 (H) 07/27/2024    HGB 10.4 (L) 07/27/2024    HCT 31.6 (L) 07/27/2024    MCV 88.5 07/27/2024     07/27/2024        Sodium   Date Value Ref Range Status   07/27/2024 140 136 - 145 mmol/L Final     Potassium   Date Value Ref Range Status   07/27/2024 3.1 (L) 3.5 - 5.1 mmol/L Final     Chloride   Date Value Ref Range Status   07/27/2024 107 98 - 107 mmol/L Final     CO2   Date Value Ref Range Status   07/27/2024 29 21 - 32 mmol/L Final     Glucose   Date Value Ref Range Status   07/27/2024 133 (H) 74 - 106 mg/dL Final     BUN   Date Value Ref Range Status   07/27/2024 27 (H) 7 - 18 mg/dL Final     Creatinine   Date Value Ref Range Status   07/27/2024 0.75 0.55 - 1.02 mg/dL Final     Calcium   Date Value Ref Range Status   07/27/2024 8.6 8.5 - 10.1 mg/dL Final     Total Protein   Date Value Ref Range Status   07/27/2024 5.9 (L) 6.4 - 8.2 g/dL Final     Albumin   Date Value Ref Range Status   07/27/2024 3.1 (L) 3.5 - 5.0 g/dL Final     Bilirubin, Total   Date Value Ref Range Status   07/27/2024 0.3 >0.0 - 1.2 mg/dL Final     Alk Phos   Date Value Ref Range Status   07/27/2024 120 55 - 142 U/L Final     AST   Date Value Ref Range Status   07/27/2024 14 (L) 15 - 37 U/L Final     ALT   Date Value Ref Range Status   07/27/2024 19 13 - 56 U/L Final     Anion Gap   Date Value Ref Range Status  "  07/27/2024 7 7 - 16 mmol/L Final     eGFR   Date Value Ref Range Status   07/27/2024 82 >=60 mL/min/1.73m2 Final      Lab Results   Component Value Date    HGBA1C 5.5 11/21/2023      Lab Results   Component Value Date    CHOL 121 11/21/2023    CHOL 138 03/02/2022     Lab Results   Component Value Date    HDL 55 11/21/2023    HDL 59 03/02/2022     Lab Results   Component Value Date    LDLCALC 51 11/21/2023    LDLCALC 59 03/02/2022     No results found for: "DLDL"  Lab Results   Component Value Date    TRIG 77 11/21/2023    TRIG 99 03/02/2022     Lab Results   Component Value Date    CHOLHDL 2.2 11/21/2023    CHOLHDL 2.3 03/02/2022      Lab Results   Component Value Date    TSH 1.270 11/21/2023        Assessment and Plan (including Health Maintenance)      Problem List  Smart Sets  Document Outside HM   :    Plan:     1. Urinary tract infection without hematuria, site unspecified    2. Urinary incontinence, unspecified type    3. Constipation, unspecified constipation type    4. Hypokalemia    5. Poor appetite  -     POCT COVID-19 Rapid Screening    6. Lethargy  -     POCT COVID-19 Rapid Screening    7. Medication intolerance    8. Fall, initial encounter    9. Anemia, unspecified type         There are no Patient Instructions on file for this visit.     Health Maintenance Due   Topic Date Due    Hepatitis C Screening  Never done    Shingles Vaccine (1 of 2) Never done    RSV Vaccine (Age 60+ and Pregnant patients) (1 - 1-dose 60+ series) Never done    TETANUS VACCINE  11/05/2022    COVID-19 Vaccine (1 - 2023-24 season) Never done         Health Maintenance Topics with due status: Not Due       Topic Last Completion Date    DEXA Scan 05/03/2022    Lipid Panel 11/21/2023    Influenza Vaccine 11/28/2023       No future appointments.     Patient to be directed admitted to hospital services. Dr Freire to receive patient and assume care. Coordinated room number assignment with house supervisor and Dr Freire. Patient " transported to hospital via POV by her daughter. Hemodynamically stable at time of clinic departure.       Signature:  MASON Bunch  [facility]  [address] [facility] [address]  [phone]    Date of encounter: 7/31/24

## 2024-08-01 LAB
ANION GAP SERPL CALCULATED.3IONS-SCNC: 10 MMOL/L (ref 7–16)
BASOPHILS # BLD AUTO: 0.04 K/UL (ref 0–0.2)
BASOPHILS NFR BLD AUTO: 0.3 % (ref 0–1)
BUN SERPL-MCNC: 9 MG/DL (ref 7–18)
BUN/CREAT SERPL: 13 (ref 6–20)
CALCIUM SERPL-MCNC: 9 MG/DL (ref 8.5–10.1)
CHLORIDE SERPL-SCNC: 104 MMOL/L (ref 98–107)
CO2 SERPL-SCNC: 28 MMOL/L (ref 21–32)
CREAT SERPL-MCNC: 0.68 MG/DL (ref 0.55–1.02)
DIFFERENTIAL METHOD BLD: ABNORMAL
EGFR (NO RACE VARIABLE) (RUSH/TITUS): 90 ML/MIN/1.73M2
EOSINOPHIL # BLD AUTO: 0.03 K/UL (ref 0–0.5)
EOSINOPHIL NFR BLD AUTO: 0.2 % (ref 1–4)
ERYTHROCYTE [DISTWIDTH] IN BLOOD BY AUTOMATED COUNT: 12.4 % (ref 11.5–14.5)
GLUCOSE SERPL-MCNC: 94 MG/DL (ref 74–106)
HCT VFR BLD AUTO: 35.2 % (ref 38–47)
HGB BLD-MCNC: 11.5 G/DL (ref 12–16)
IMM GRANULOCYTES # BLD AUTO: 0.09 K/UL (ref 0–0.04)
IMM GRANULOCYTES NFR BLD: 0.6 % (ref 0–0.4)
LYMPHOCYTES # BLD AUTO: 1.36 K/UL (ref 1–4.8)
LYMPHOCYTES NFR BLD AUTO: 8.5 % (ref 27–41)
MAGNESIUM SERPL-MCNC: 2.2 MG/DL (ref 1.7–2.3)
MCH RBC QN AUTO: 29 PG (ref 27–31)
MCHC RBC AUTO-ENTMCNC: 32.7 G/DL (ref 32–36)
MCV RBC AUTO: 88.7 FL (ref 80–96)
MONOCYTES # BLD AUTO: 1.11 K/UL (ref 0–0.8)
MONOCYTES NFR BLD AUTO: 7 % (ref 2–6)
MPC BLD CALC-MCNC: 10.1 FL (ref 9.4–12.4)
NEUTROPHILS # BLD AUTO: 13.28 K/UL (ref 1.8–7.7)
NEUTROPHILS NFR BLD AUTO: 83.4 % (ref 53–65)
NRBC # BLD AUTO: 0 X10E3/UL
NRBC, AUTO (.00): 0 %
PHOSPHATE SERPL-MCNC: 2.6 MG/DL (ref 2.5–4.5)
PLATELET # BLD AUTO: 220 K/UL (ref 150–400)
POTASSIUM SERPL-SCNC: 3.5 MMOL/L (ref 3.5–5.1)
RBC # BLD AUTO: 3.97 M/UL (ref 4.2–5.4)
SODIUM SERPL-SCNC: 138 MMOL/L (ref 136–145)
WBC # BLD AUTO: 15.91 K/UL (ref 4.5–11)

## 2024-08-01 PROCEDURE — 27000207 HC ISOLATION

## 2024-08-01 PROCEDURE — 99233 SBSQ HOSP IP/OBS HIGH 50: CPT | Mod: ,,, | Performed by: STUDENT IN AN ORGANIZED HEALTH CARE EDUCATION/TRAINING PROGRAM

## 2024-08-01 PROCEDURE — 97530 THERAPEUTIC ACTIVITIES: CPT

## 2024-08-01 PROCEDURE — 94761 N-INVAS EAR/PLS OXIMETRY MLT: CPT

## 2024-08-01 PROCEDURE — 25000242 PHARM REV CODE 250 ALT 637 W/ HCPCS: Performed by: STUDENT IN AN ORGANIZED HEALTH CARE EDUCATION/TRAINING PROGRAM

## 2024-08-01 PROCEDURE — 84100 ASSAY OF PHOSPHORUS: CPT | Performed by: STUDENT IN AN ORGANIZED HEALTH CARE EDUCATION/TRAINING PROGRAM

## 2024-08-01 PROCEDURE — 85025 COMPLETE CBC W/AUTO DIFF WBC: CPT | Performed by: STUDENT IN AN ORGANIZED HEALTH CARE EDUCATION/TRAINING PROGRAM

## 2024-08-01 PROCEDURE — 63600175 PHARM REV CODE 636 W HCPCS: Performed by: STUDENT IN AN ORGANIZED HEALTH CARE EDUCATION/TRAINING PROGRAM

## 2024-08-01 PROCEDURE — 80048 BASIC METABOLIC PNL TOTAL CA: CPT | Performed by: STUDENT IN AN ORGANIZED HEALTH CARE EDUCATION/TRAINING PROGRAM

## 2024-08-01 PROCEDURE — 83735 ASSAY OF MAGNESIUM: CPT | Performed by: STUDENT IN AN ORGANIZED HEALTH CARE EDUCATION/TRAINING PROGRAM

## 2024-08-01 PROCEDURE — 97166 OT EVAL MOD COMPLEX 45 MIN: CPT

## 2024-08-01 PROCEDURE — 97161 PT EVAL LOW COMPLEX 20 MIN: CPT

## 2024-08-01 PROCEDURE — 11000001 HC ACUTE MED/SURG PRIVATE ROOM

## 2024-08-01 PROCEDURE — 25000003 PHARM REV CODE 250: Performed by: STUDENT IN AN ORGANIZED HEALTH CARE EDUCATION/TRAINING PROGRAM

## 2024-08-01 PROCEDURE — 36415 COLL VENOUS BLD VENIPUNCTURE: CPT | Performed by: STUDENT IN AN ORGANIZED HEALTH CARE EDUCATION/TRAINING PROGRAM

## 2024-08-01 PROCEDURE — 94640 AIRWAY INHALATION TREATMENT: CPT

## 2024-08-01 PROCEDURE — 99900035 HC TECH TIME PER 15 MIN (STAT)

## 2024-08-01 RX ADMIN — TRAZODONE HYDROCHLORIDE 150 MG: 150 TABLET ORAL at 08:08

## 2024-08-01 RX ADMIN — SENNOSIDES, DOCUSATE SODIUM 1 TABLET: 8.6; 5 TABLET ORAL at 08:08

## 2024-08-01 RX ADMIN — HEPARIN SODIUM 5000 UNITS: 5000 INJECTION, SOLUTION INTRAVENOUS; SUBCUTANEOUS at 08:08

## 2024-08-01 RX ADMIN — MEMANTINE 10 MG: 10 TABLET ORAL at 08:08

## 2024-08-01 RX ADMIN — TRAMADOL HYDROCHLORIDE 50 MG: 50 TABLET, COATED ORAL at 08:08

## 2024-08-01 RX ADMIN — DONEPEZIL HYDROCHLORIDE 10 MG: 5 TABLET ORAL at 08:08

## 2024-08-01 RX ADMIN — LEVOFLOXACIN 750 MG: 750 INJECTION, SOLUTION INTRAVENOUS at 05:08

## 2024-08-01 RX ADMIN — SODIUM CHLORIDE: 9 INJECTION, SOLUTION INTRAVENOUS at 10:08

## 2024-08-01 RX ADMIN — ACETYLCYSTEINE 4 ML: 200 SOLUTION ORAL; RESPIRATORY (INHALATION) at 06:08

## 2024-08-01 RX ADMIN — HEPARIN SODIUM 5000 UNITS: 5000 INJECTION, SOLUTION INTRAVENOUS; SUBCUTANEOUS at 05:08

## 2024-08-01 RX ADMIN — ACETYLCYSTEINE 4 ML: 200 SOLUTION ORAL; RESPIRATORY (INHALATION) at 07:08

## 2024-08-01 RX ADMIN — IPRATROPIUM BROMIDE AND ALBUTEROL SULFATE 3 ML: .5; 3 SOLUTION RESPIRATORY (INHALATION) at 06:08

## 2024-08-01 RX ADMIN — IPRATROPIUM BROMIDE AND ALBUTEROL SULFATE 3 ML: .5; 3 SOLUTION RESPIRATORY (INHALATION) at 07:08

## 2024-08-01 RX ADMIN — HEPARIN SODIUM 5000 UNITS: 5000 INJECTION, SOLUTION INTRAVENOUS; SUBCUTANEOUS at 03:08

## 2024-08-01 RX ADMIN — POLYETHYLENE GLYCOL 3350 34 G: 17 POWDER, FOR SOLUTION ORAL at 08:08

## 2024-08-02 ENCOUNTER — TELEPHONE (OUTPATIENT)
Dept: ADMINISTRATIVE | Facility: HOSPITAL | Age: 79
End: 2024-08-02

## 2024-08-02 LAB
ANION GAP SERPL CALCULATED.3IONS-SCNC: 7 MMOL/L (ref 7–16)
BACTERIA BLD CULT: NORMAL
BACTERIA BLD CULT: NORMAL
BASOPHILS # BLD AUTO: 0.03 K/UL (ref 0–0.2)
BASOPHILS NFR BLD AUTO: 0.3 % (ref 0–1)
BUN SERPL-MCNC: 6 MG/DL (ref 7–18)
BUN/CREAT SERPL: 11 (ref 6–20)
CALCIUM SERPL-MCNC: 7.8 MG/DL (ref 8.5–10.1)
CHLORIDE SERPL-SCNC: 112 MMOL/L (ref 98–107)
CO2 SERPL-SCNC: 27 MMOL/L (ref 21–32)
CREAT SERPL-MCNC: 0.54 MG/DL (ref 0.55–1.02)
DIFFERENTIAL METHOD BLD: ABNORMAL
EGFR (NO RACE VARIABLE) (RUSH/TITUS): 95 ML/MIN/1.73M2
EOSINOPHIL # BLD AUTO: 0.07 K/UL (ref 0–0.5)
EOSINOPHIL NFR BLD AUTO: 0.7 % (ref 1–4)
ERYTHROCYTE [DISTWIDTH] IN BLOOD BY AUTOMATED COUNT: 12.1 % (ref 11.5–14.5)
GLUCOSE SERPL-MCNC: 112 MG/DL (ref 74–106)
HCT VFR BLD AUTO: 32.6 % (ref 38–47)
HGB BLD-MCNC: 10.6 G/DL (ref 12–16)
IMM GRANULOCYTES # BLD AUTO: 0.07 K/UL (ref 0–0.04)
IMM GRANULOCYTES NFR BLD: 0.7 % (ref 0–0.4)
LYMPHOCYTES # BLD AUTO: 1.13 K/UL (ref 1–4.8)
LYMPHOCYTES NFR BLD AUTO: 11.7 % (ref 27–41)
MAGNESIUM SERPL-MCNC: 1.9 MG/DL (ref 1.7–2.3)
MCH RBC QN AUTO: 28.8 PG (ref 27–31)
MCHC RBC AUTO-ENTMCNC: 32.5 G/DL (ref 32–36)
MCV RBC AUTO: 88.6 FL (ref 80–96)
MONOCYTES # BLD AUTO: 0.64 K/UL (ref 0–0.8)
MONOCYTES NFR BLD AUTO: 6.6 % (ref 2–6)
MPC BLD CALC-MCNC: 9.6 FL (ref 9.4–12.4)
NEUTROPHILS # BLD AUTO: 7.75 K/UL (ref 1.8–7.7)
NEUTROPHILS NFR BLD AUTO: 80 % (ref 53–65)
NRBC # BLD AUTO: 0 X10E3/UL
NRBC, AUTO (.00): 0 %
PHOSPHATE SERPL-MCNC: 3.3 MG/DL (ref 2.5–4.5)
PLATELET # BLD AUTO: 199 K/UL (ref 150–400)
POTASSIUM SERPL-SCNC: 3.2 MMOL/L (ref 3.5–5.1)
RBC # BLD AUTO: 3.68 M/UL (ref 4.2–5.4)
SODIUM SERPL-SCNC: 143 MMOL/L (ref 136–145)
WBC # BLD AUTO: 9.69 K/UL (ref 4.5–11)

## 2024-08-02 PROCEDURE — 25000242 PHARM REV CODE 250 ALT 637 W/ HCPCS: Performed by: STUDENT IN AN ORGANIZED HEALTH CARE EDUCATION/TRAINING PROGRAM

## 2024-08-02 PROCEDURE — 94668 MNPJ CHEST WALL SBSQ: CPT

## 2024-08-02 PROCEDURE — 27000207 HC ISOLATION

## 2024-08-02 PROCEDURE — 99233 SBSQ HOSP IP/OBS HIGH 50: CPT | Mod: ,,, | Performed by: STUDENT IN AN ORGANIZED HEALTH CARE EDUCATION/TRAINING PROGRAM

## 2024-08-02 PROCEDURE — 94640 AIRWAY INHALATION TREATMENT: CPT

## 2024-08-02 PROCEDURE — 11000001 HC ACUTE MED/SURG PRIVATE ROOM

## 2024-08-02 PROCEDURE — 85025 COMPLETE CBC W/AUTO DIFF WBC: CPT | Performed by: STUDENT IN AN ORGANIZED HEALTH CARE EDUCATION/TRAINING PROGRAM

## 2024-08-02 PROCEDURE — 99900035 HC TECH TIME PER 15 MIN (STAT)

## 2024-08-02 PROCEDURE — 83735 ASSAY OF MAGNESIUM: CPT | Performed by: STUDENT IN AN ORGANIZED HEALTH CARE EDUCATION/TRAINING PROGRAM

## 2024-08-02 PROCEDURE — 36415 COLL VENOUS BLD VENIPUNCTURE: CPT | Performed by: STUDENT IN AN ORGANIZED HEALTH CARE EDUCATION/TRAINING PROGRAM

## 2024-08-02 PROCEDURE — 84100 ASSAY OF PHOSPHORUS: CPT | Performed by: STUDENT IN AN ORGANIZED HEALTH CARE EDUCATION/TRAINING PROGRAM

## 2024-08-02 PROCEDURE — 25000003 PHARM REV CODE 250: Performed by: STUDENT IN AN ORGANIZED HEALTH CARE EDUCATION/TRAINING PROGRAM

## 2024-08-02 PROCEDURE — 97530 THERAPEUTIC ACTIVITIES: CPT | Mod: CO

## 2024-08-02 PROCEDURE — 94761 N-INVAS EAR/PLS OXIMETRY MLT: CPT

## 2024-08-02 PROCEDURE — 63600175 PHARM REV CODE 636 W HCPCS: Performed by: STUDENT IN AN ORGANIZED HEALTH CARE EDUCATION/TRAINING PROGRAM

## 2024-08-02 PROCEDURE — 63600175 PHARM REV CODE 636 W HCPCS: Performed by: INTERNAL MEDICINE

## 2024-08-02 PROCEDURE — 80048 BASIC METABOLIC PNL TOTAL CA: CPT | Performed by: STUDENT IN AN ORGANIZED HEALTH CARE EDUCATION/TRAINING PROGRAM

## 2024-08-02 RX ORDER — MEGESTROL ACETATE 40 MG/1
40 TABLET ORAL DAILY
Status: DISCONTINUED | OUTPATIENT
Start: 2024-08-02 | End: 2024-08-03 | Stop reason: HOSPADM

## 2024-08-02 RX ORDER — POTASSIUM CHLORIDE 20 MEQ/1
40 TABLET, EXTENDED RELEASE ORAL ONCE
Status: COMPLETED | OUTPATIENT
Start: 2024-08-02 | End: 2024-08-02

## 2024-08-02 RX ORDER — LEVOFLOXACIN 750 MG/1
750 TABLET ORAL
Status: DISCONTINUED | OUTPATIENT
Start: 2024-08-02 | End: 2024-08-03 | Stop reason: HOSPADM

## 2024-08-02 RX ORDER — ZIPRASIDONE MESYLATE 20 MG/ML
10 INJECTION, POWDER, LYOPHILIZED, FOR SOLUTION INTRAMUSCULAR ONCE
Status: COMPLETED | OUTPATIENT
Start: 2024-08-02 | End: 2024-08-02

## 2024-08-02 RX ADMIN — POLYETHYLENE GLYCOL 3350 34 G: 17 POWDER, FOR SOLUTION ORAL at 09:08

## 2024-08-02 RX ADMIN — HEPARIN SODIUM 5000 UNITS: 5000 INJECTION, SOLUTION INTRAVENOUS; SUBCUTANEOUS at 06:08

## 2024-08-02 RX ADMIN — ACETYLCYSTEINE 4 ML: 200 SOLUTION ORAL; RESPIRATORY (INHALATION) at 07:08

## 2024-08-02 RX ADMIN — CITALOPRAM HYDROBROMIDE 20 MG: 20 TABLET ORAL at 09:08

## 2024-08-02 RX ADMIN — DONEPEZIL HYDROCHLORIDE 10 MG: 5 TABLET ORAL at 09:08

## 2024-08-02 RX ADMIN — MEMANTINE 10 MG: 10 TABLET ORAL at 09:08

## 2024-08-02 RX ADMIN — IPRATROPIUM BROMIDE AND ALBUTEROL SULFATE 3 ML: .5; 3 SOLUTION RESPIRATORY (INHALATION) at 07:08

## 2024-08-02 RX ADMIN — SENNOSIDES, DOCUSATE SODIUM 1 TABLET: 8.6; 5 TABLET ORAL at 09:08

## 2024-08-02 RX ADMIN — TRAZODONE HYDROCHLORIDE 150 MG: 150 TABLET ORAL at 09:08

## 2024-08-02 RX ADMIN — PANTOPRAZOLE SODIUM 40 MG: 40 TABLET, DELAYED RELEASE ORAL at 09:08

## 2024-08-02 RX ADMIN — IPRATROPIUM BROMIDE AND ALBUTEROL SULFATE 3 ML: .5; 3 SOLUTION RESPIRATORY (INHALATION) at 08:08

## 2024-08-02 RX ADMIN — FLUTICASONE PROPIONATE 50 MCG: 50 SPRAY, METERED NASAL at 09:08

## 2024-08-02 RX ADMIN — HEPARIN SODIUM 5000 UNITS: 5000 INJECTION, SOLUTION INTRAVENOUS; SUBCUTANEOUS at 10:08

## 2024-08-02 RX ADMIN — ACETYLCYSTEINE 4 ML: 200 SOLUTION ORAL; RESPIRATORY (INHALATION) at 08:08

## 2024-08-02 RX ADMIN — BUSPIRONE HYDROCHLORIDE 10 MG: 5 TABLET ORAL at 09:08

## 2024-08-02 RX ADMIN — MEGESTROL ACETATE 40 MG: 40 TABLET ORAL at 11:08

## 2024-08-02 RX ADMIN — LEVOFLOXACIN 750 MG: 750 TABLET, FILM COATED ORAL at 09:08

## 2024-08-02 RX ADMIN — ZIPRASIDONE MESYLATE 10 MG: 20 INJECTION, POWDER, LYOPHILIZED, FOR SOLUTION INTRAMUSCULAR at 10:08

## 2024-08-02 RX ADMIN — ATORVASTATIN CALCIUM 40 MG: 40 TABLET, FILM COATED ORAL at 09:08

## 2024-08-02 RX ADMIN — POTASSIUM CHLORIDE 40 MEQ: 1500 TABLET, EXTENDED RELEASE ORAL at 10:08

## 2024-08-03 ENCOUNTER — HOSPITAL ENCOUNTER (INPATIENT)
Facility: HOSPITAL | Age: 79
LOS: 10 days | Discharge: NURSING, REHAB OR LONG-TERM CARE | DRG: 947 | End: 2024-08-13
Attending: FAMILY MEDICINE | Admitting: FAMILY MEDICINE
Payer: MEDICARE

## 2024-08-03 VITALS
HEIGHT: 65 IN | DIASTOLIC BLOOD PRESSURE: 82 MMHG | SYSTOLIC BLOOD PRESSURE: 145 MMHG | TEMPERATURE: 98 F | BODY MASS INDEX: 18.83 KG/M2 | RESPIRATION RATE: 22 BRPM | WEIGHT: 113 LBS | OXYGEN SATURATION: 95 % | HEART RATE: 74 BPM

## 2024-08-03 DIAGNOSIS — E43 SEVERE PROTEIN-CALORIE MALNUTRITION: ICD-10-CM

## 2024-08-03 DIAGNOSIS — R53.1 GENERALIZED WEAKNESS: ICD-10-CM

## 2024-08-03 DIAGNOSIS — E78.2 MIXED HYPERLIPIDEMIA: ICD-10-CM

## 2024-08-03 DIAGNOSIS — F03.918 DEMENTIA WITH BEHAVIORAL DISTURBANCE: Primary | ICD-10-CM

## 2024-08-03 DIAGNOSIS — I10 PRIMARY HYPERTENSION: ICD-10-CM

## 2024-08-03 LAB
ANION GAP SERPL CALCULATED.3IONS-SCNC: 10 MMOL/L (ref 7–16)
BASOPHILS # BLD AUTO: 0.03 K/UL (ref 0–0.2)
BASOPHILS NFR BLD AUTO: 0.4 % (ref 0–1)
BUN SERPL-MCNC: 15 MG/DL (ref 7–18)
BUN/CREAT SERPL: 23 (ref 6–20)
CALCIUM SERPL-MCNC: 9.4 MG/DL (ref 8.5–10.1)
CHLORIDE SERPL-SCNC: 104 MMOL/L (ref 98–107)
CO2 SERPL-SCNC: 29 MMOL/L (ref 21–32)
CREAT SERPL-MCNC: 0.66 MG/DL (ref 0.55–1.02)
DIFFERENTIAL METHOD BLD: ABNORMAL
EGFR (NO RACE VARIABLE) (RUSH/TITUS): 90 ML/MIN/1.73M2
EOSINOPHIL # BLD AUTO: 0.12 K/UL (ref 0–0.5)
EOSINOPHIL NFR BLD AUTO: 1.4 % (ref 1–4)
ERYTHROCYTE [DISTWIDTH] IN BLOOD BY AUTOMATED COUNT: 12.5 % (ref 11.5–14.5)
GLUCOSE SERPL-MCNC: 96 MG/DL (ref 74–106)
HCT VFR BLD AUTO: 33 % (ref 38–47)
HGB BLD-MCNC: 10.4 G/DL (ref 12–16)
IMM GRANULOCYTES # BLD AUTO: 0.06 K/UL (ref 0–0.04)
IMM GRANULOCYTES NFR BLD: 0.7 % (ref 0–0.4)
LYMPHOCYTES # BLD AUTO: 1.25 K/UL (ref 1–4.8)
LYMPHOCYTES NFR BLD AUTO: 14.7 % (ref 27–41)
MAGNESIUM SERPL-MCNC: 2.5 MG/DL (ref 1.7–2.3)
MCH RBC QN AUTO: 28.2 PG (ref 27–31)
MCHC RBC AUTO-ENTMCNC: 31.5 G/DL (ref 32–36)
MCV RBC AUTO: 89.4 FL (ref 80–96)
MONOCYTES # BLD AUTO: 0.65 K/UL (ref 0–0.8)
MONOCYTES NFR BLD AUTO: 7.6 % (ref 2–6)
MPC BLD CALC-MCNC: 9.8 FL (ref 9.4–12.4)
NEUTROPHILS # BLD AUTO: 6.39 K/UL (ref 1.8–7.7)
NEUTROPHILS NFR BLD AUTO: 75.2 % (ref 53–65)
NRBC # BLD AUTO: 0 X10E3/UL
NRBC, AUTO (.00): 0 %
PHOSPHATE SERPL-MCNC: 3.1 MG/DL (ref 2.5–4.5)
PLATELET # BLD AUTO: 227 K/UL (ref 150–400)
POTASSIUM SERPL-SCNC: 3.6 MMOL/L (ref 3.5–5.1)
RBC # BLD AUTO: 3.69 M/UL (ref 4.2–5.4)
SODIUM SERPL-SCNC: 139 MMOL/L (ref 136–145)
WBC # BLD AUTO: 8.5 K/UL (ref 4.5–11)

## 2024-08-03 PROCEDURE — 25000242 PHARM REV CODE 250 ALT 637 W/ HCPCS: Performed by: NURSE PRACTITIONER

## 2024-08-03 PROCEDURE — 63600175 PHARM REV CODE 636 W HCPCS: Performed by: INTERNAL MEDICINE

## 2024-08-03 PROCEDURE — 83735 ASSAY OF MAGNESIUM: CPT | Performed by: STUDENT IN AN ORGANIZED HEALTH CARE EDUCATION/TRAINING PROGRAM

## 2024-08-03 PROCEDURE — 94761 N-INVAS EAR/PLS OXIMETRY MLT: CPT

## 2024-08-03 PROCEDURE — 36415 COLL VENOUS BLD VENIPUNCTURE: CPT | Performed by: STUDENT IN AN ORGANIZED HEALTH CARE EDUCATION/TRAINING PROGRAM

## 2024-08-03 PROCEDURE — 84100 ASSAY OF PHOSPHORUS: CPT | Performed by: STUDENT IN AN ORGANIZED HEALTH CARE EDUCATION/TRAINING PROGRAM

## 2024-08-03 PROCEDURE — 94640 AIRWAY INHALATION TREATMENT: CPT

## 2024-08-03 PROCEDURE — 25000242 PHARM REV CODE 250 ALT 637 W/ HCPCS: Performed by: STUDENT IN AN ORGANIZED HEALTH CARE EDUCATION/TRAINING PROGRAM

## 2024-08-03 PROCEDURE — 25000003 PHARM REV CODE 250: Performed by: NURSE PRACTITIONER

## 2024-08-03 PROCEDURE — 25000003 PHARM REV CODE 250: Performed by: STUDENT IN AN ORGANIZED HEALTH CARE EDUCATION/TRAINING PROGRAM

## 2024-08-03 PROCEDURE — 11000004 HC SNF PRIVATE

## 2024-08-03 PROCEDURE — 99239 HOSP IP/OBS DSCHRG MGMT >30: CPT | Mod: ,,, | Performed by: STUDENT IN AN ORGANIZED HEALTH CARE EDUCATION/TRAINING PROGRAM

## 2024-08-03 PROCEDURE — 80048 BASIC METABOLIC PNL TOTAL CA: CPT | Performed by: STUDENT IN AN ORGANIZED HEALTH CARE EDUCATION/TRAINING PROGRAM

## 2024-08-03 PROCEDURE — 63600175 PHARM REV CODE 636 W HCPCS: Performed by: NURSE PRACTITIONER

## 2024-08-03 PROCEDURE — A6212 FOAM DRG <=16 SQ IN W/BORDER: HCPCS

## 2024-08-03 PROCEDURE — 63600175 PHARM REV CODE 636 W HCPCS: Performed by: STUDENT IN AN ORGANIZED HEALTH CARE EDUCATION/TRAINING PROGRAM

## 2024-08-03 PROCEDURE — 99900035 HC TECH TIME PER 15 MIN (STAT)

## 2024-08-03 PROCEDURE — 85025 COMPLETE CBC W/AUTO DIFF WBC: CPT | Performed by: STUDENT IN AN ORGANIZED HEALTH CARE EDUCATION/TRAINING PROGRAM

## 2024-08-03 PROCEDURE — 27000944

## 2024-08-03 PROCEDURE — 27000982 HC MATTRESS, MATRIX LAL RENTAL

## 2024-08-03 RX ORDER — POLYETHYLENE GLYCOL 3350 17 G/17G
34 POWDER, FOR SOLUTION ORAL 2 TIMES DAILY
Status: DISCONTINUED | OUTPATIENT
Start: 2024-08-03 | End: 2024-08-06

## 2024-08-03 RX ORDER — ATORVASTATIN CALCIUM 40 MG/1
40 TABLET, FILM COATED ORAL DAILY
Status: DISCONTINUED | OUTPATIENT
Start: 2024-08-03 | End: 2024-08-13 | Stop reason: HOSPADM

## 2024-08-03 RX ORDER — MEGESTROL ACETATE 40 MG/1
40 TABLET ORAL DAILY
Status: ON HOLD
Start: 2024-08-04 | End: 2025-08-04

## 2024-08-03 RX ORDER — ENOXAPARIN SODIUM 100 MG/ML
30 INJECTION SUBCUTANEOUS EVERY 24 HOURS
Status: DISCONTINUED | OUTPATIENT
Start: 2024-08-03 | End: 2024-08-05 | Stop reason: DRUGHIGH

## 2024-08-03 RX ORDER — MEMANTINE HYDROCHLORIDE 5 MG/1
10 TABLET ORAL 2 TIMES DAILY
Status: DISCONTINUED | OUTPATIENT
Start: 2024-08-03 | End: 2024-08-13 | Stop reason: HOSPADM

## 2024-08-03 RX ORDER — ACETAMINOPHEN 325 MG/1
650 TABLET ORAL EVERY 6 HOURS PRN
Status: DISCONTINUED | OUTPATIENT
Start: 2024-08-03 | End: 2024-08-13 | Stop reason: HOSPADM

## 2024-08-03 RX ORDER — PANTOPRAZOLE SODIUM 40 MG/1
40 TABLET, DELAYED RELEASE ORAL DAILY
Status: DISCONTINUED | OUTPATIENT
Start: 2024-08-03 | End: 2024-08-13 | Stop reason: HOSPADM

## 2024-08-03 RX ORDER — TRAMADOL HYDROCHLORIDE 50 MG/1
50 TABLET ORAL EVERY 6 HOURS PRN
Status: DISCONTINUED | OUTPATIENT
Start: 2024-08-03 | End: 2024-08-05

## 2024-08-03 RX ORDER — ACETYLCYSTEINE 200 MG/ML
4 SOLUTION ORAL; RESPIRATORY (INHALATION) 2 TIMES DAILY
Status: ON HOLD
Start: 2024-08-03 | End: 2024-09-02

## 2024-08-03 RX ORDER — HALOPERIDOL 5 MG/ML
5 INJECTION INTRAMUSCULAR ONCE
Status: COMPLETED | OUTPATIENT
Start: 2024-08-03 | End: 2024-08-03

## 2024-08-03 RX ORDER — POLYETHYLENE GLYCOL 3350 17 G/17G
34 POWDER, FOR SOLUTION ORAL 2 TIMES DAILY
Status: ON HOLD
Start: 2024-08-03

## 2024-08-03 RX ORDER — DONEPEZIL HYDROCHLORIDE 5 MG/1
10 TABLET, FILM COATED ORAL NIGHTLY
Status: DISCONTINUED | OUTPATIENT
Start: 2024-08-03 | End: 2024-08-13 | Stop reason: HOSPADM

## 2024-08-03 RX ORDER — AMOXICILLIN 250 MG
1 CAPSULE ORAL 2 TIMES DAILY PRN
Status: DISCONTINUED | OUTPATIENT
Start: 2024-08-03 | End: 2024-08-13 | Stop reason: HOSPADM

## 2024-08-03 RX ORDER — ACETYLCYSTEINE 200 MG/ML
4 SOLUTION ORAL; RESPIRATORY (INHALATION) 2 TIMES DAILY
Status: DISCONTINUED | OUTPATIENT
Start: 2024-08-03 | End: 2024-08-04

## 2024-08-03 RX ORDER — LEVOFLOXACIN 750 MG/1
750 TABLET ORAL DAILY
Status: ON HOLD
Start: 2024-08-03

## 2024-08-03 RX ORDER — CITALOPRAM 20 MG/1
40 TABLET, FILM COATED ORAL DAILY
Status: DISCONTINUED | OUTPATIENT
Start: 2024-08-04 | End: 2024-08-13 | Stop reason: HOSPADM

## 2024-08-03 RX ORDER — MEGESTROL ACETATE 40 MG/1
40 TABLET ORAL DAILY
Status: DISCONTINUED | OUTPATIENT
Start: 2024-08-04 | End: 2024-08-13 | Stop reason: HOSPADM

## 2024-08-03 RX ORDER — FLUTICASONE PROPIONATE 50 MCG
1 SPRAY, SUSPENSION (ML) NASAL DAILY
Status: DISCONTINUED | OUTPATIENT
Start: 2024-08-03 | End: 2024-08-13 | Stop reason: HOSPADM

## 2024-08-03 RX ORDER — IPRATROPIUM BROMIDE AND ALBUTEROL SULFATE 2.5; .5 MG/3ML; MG/3ML
3 SOLUTION RESPIRATORY (INHALATION) EVERY 6 HOURS PRN
Qty: 75 ML | Refills: 0 | Status: ON HOLD | OUTPATIENT
Start: 2024-08-03 | End: 2025-08-03

## 2024-08-03 RX ORDER — BUSPIRONE HYDROCHLORIDE 5 MG/1
10 TABLET ORAL DAILY
Status: DISCONTINUED | OUTPATIENT
Start: 2024-08-03 | End: 2024-08-13 | Stop reason: HOSPADM

## 2024-08-03 RX ORDER — TRAZODONE HYDROCHLORIDE 50 MG/1
150 TABLET ORAL NIGHTLY
Status: DISCONTINUED | OUTPATIENT
Start: 2024-08-03 | End: 2024-08-13 | Stop reason: HOSPADM

## 2024-08-03 RX ORDER — ONDANSETRON 4 MG/1
4 TABLET, ORALLY DISINTEGRATING ORAL EVERY 8 HOURS PRN
Status: DISCONTINUED | OUTPATIENT
Start: 2024-08-03 | End: 2024-08-13 | Stop reason: HOSPADM

## 2024-08-03 RX ORDER — CALCIUM CARBONATE 200(500)MG
500 TABLET,CHEWABLE ORAL 2 TIMES DAILY PRN
Status: DISCONTINUED | OUTPATIENT
Start: 2024-08-03 | End: 2024-08-13 | Stop reason: HOSPADM

## 2024-08-03 RX ORDER — IPRATROPIUM BROMIDE AND ALBUTEROL SULFATE 2.5; .5 MG/3ML; MG/3ML
3 SOLUTION RESPIRATORY (INHALATION) EVERY 6 HOURS PRN
Status: DISCONTINUED | OUTPATIENT
Start: 2024-08-03 | End: 2024-08-13 | Stop reason: HOSPADM

## 2024-08-03 RX ADMIN — TRAZODONE HYDROCHLORIDE 150 MG: 50 TABLET ORAL at 08:08

## 2024-08-03 RX ADMIN — BUSPIRONE HYDROCHLORIDE 10 MG: 5 TABLET ORAL at 08:08

## 2024-08-03 RX ADMIN — POLYETHYLENE GLYCOL 3350 34 G: 17 POWDER, FOR SOLUTION ORAL at 08:08

## 2024-08-03 RX ADMIN — PANTOPRAZOLE SODIUM 40 MG: 40 TABLET, DELAYED RELEASE ORAL at 02:08

## 2024-08-03 RX ADMIN — IPRATROPIUM BROMIDE AND ALBUTEROL SULFATE 3 ML: 2.5; .5 SOLUTION RESPIRATORY (INHALATION) at 08:08

## 2024-08-03 RX ADMIN — ATORVASTATIN CALCIUM 40 MG: 40 TABLET, FILM COATED ORAL at 08:08

## 2024-08-03 RX ADMIN — ACETYLCYSTEINE 4 ML: 200 INHALANT RESPIRATORY (INHALATION) at 08:08

## 2024-08-03 RX ADMIN — ACETYLCYSTEINE 4 ML: 200 SOLUTION ORAL; RESPIRATORY (INHALATION) at 08:08

## 2024-08-03 RX ADMIN — ENOXAPARIN SODIUM 30 MG: 30 INJECTION SUBCUTANEOUS at 06:08

## 2024-08-03 RX ADMIN — IPRATROPIUM BROMIDE AND ALBUTEROL SULFATE 3 ML: .5; 3 SOLUTION RESPIRATORY (INHALATION) at 08:08

## 2024-08-03 RX ADMIN — FLUTICASONE PROPIONATE 50 MCG: 50 SPRAY, METERED NASAL at 02:08

## 2024-08-03 RX ADMIN — FLUTICASONE PROPIONATE 50 MCG: 50 SPRAY, METERED NASAL at 08:08

## 2024-08-03 RX ADMIN — HEPARIN SODIUM 5000 UNITS: 5000 INJECTION, SOLUTION INTRAVENOUS; SUBCUTANEOUS at 06:08

## 2024-08-03 RX ADMIN — TRAMADOL HYDROCHLORIDE 50 MG: 50 TABLET, COATED ORAL at 11:08

## 2024-08-03 RX ADMIN — MEMANTINE 10 MG: 5 TABLET ORAL at 08:08

## 2024-08-03 RX ADMIN — HALOPERIDOL LACTATE 5 MG: 5 INJECTION, SOLUTION INTRAMUSCULAR at 01:08

## 2024-08-03 RX ADMIN — BUSPIRONE HYDROCHLORIDE 10 MG: 5 TABLET ORAL at 02:08

## 2024-08-03 RX ADMIN — ATORVASTATIN CALCIUM 40 MG: 40 TABLET, FILM COATED ORAL at 02:08

## 2024-08-03 RX ADMIN — SENNOSIDES, DOCUSATE SODIUM 1 TABLET: 8.6; 5 TABLET ORAL at 08:08

## 2024-08-03 RX ADMIN — CITALOPRAM HYDROBROMIDE 20 MG: 20 TABLET ORAL at 08:08

## 2024-08-03 RX ADMIN — MEMANTINE 10 MG: 10 TABLET ORAL at 08:08

## 2024-08-03 RX ADMIN — LEVOFLOXACIN 750 MG: 500 TABLET, FILM COATED ORAL at 02:08

## 2024-08-03 RX ADMIN — PANTOPRAZOLE SODIUM 40 MG: 40 TABLET, DELAYED RELEASE ORAL at 08:08

## 2024-08-03 RX ADMIN — MEGESTROL ACETATE 40 MG: 40 TABLET ORAL at 08:08

## 2024-08-03 RX ADMIN — DONEPEZIL HYDROCHLORIDE 10 MG: 5 TABLET ORAL at 08:08

## 2024-08-03 NOTE — SUBJECTIVE & OBJECTIVE
Past Medical History:   Diagnosis Date    Achrochordon     Aphasia as late effect of cerebrovascular accident     Cervicalgia     Diabetes mellitus, type 2     Hiatal hernia with GERD     Insomnia secondary to depression with anxiety     Mixed hyperlipidemia 03/02/2022    Primary hypertension 03/02/2022    Senile dementia with behavioral disturbance     Sleep related leg cramps     Stroke     TMJ (dislocation of temporomandibular joint)     Vascular dementia with behavior disturbance 03/02/2022    Vitamin B 12 deficiency     Vitamin D deficiency        Past Surgical History:   Procedure Laterality Date    BREAST BIOPSY      rt.core biopsy    carotid dopple complete      CHOLECYSTECTOMY      COLONOSCOPY  01/10/2017    repeat in 5 years    ESOPHAGOGASTRODUODENOSCOPY  11/29/2016    HYSTERECTOMY         Review of patient's allergies indicates:   Allergen Reactions    Sulfa (sulfonamide antibiotics) Anaphylaxis    Codeine Nausea Only       Current Facility-Administered Medications on File Prior to Encounter   Medication    [COMPLETED] haloperidol lactate injection 5 mg    [COMPLETED] ziprasidone injection 10 mg    [DISCONTINUED] 0.9%  NaCl infusion    [DISCONTINUED] acetylcysteine 200 mg/ml (20%) solution 4 mL    [DISCONTINUED] albuterol-ipratropium 2.5 mg-0.5 mg/3 mL nebulizer solution 3 mL    [DISCONTINUED] atorvastatin tablet 40 mg    [DISCONTINUED] busPIRone tablet 10 mg    [DISCONTINUED] citalopram tablet 20 mg    [DISCONTINUED] dextrose 10% bolus 125 mL 125 mL    [DISCONTINUED] dextrose 10% bolus 250 mL 250 mL    [DISCONTINUED] donepeziL tablet 10 mg    [DISCONTINUED] fluticasone propionate 50 mcg/actuation nasal spray 50 mcg    [DISCONTINUED] glucagon (human recombinant) injection 1 mg    [DISCONTINUED] glucose chewable tablet 16 g    [DISCONTINUED] glucose chewable tablet 24 g    [DISCONTINUED] heparin (porcine) injection 5,000 Units    [DISCONTINUED] levoFLOXacin tablet 750 mg    [DISCONTINUED] megestroL tablet  40 mg    [DISCONTINUED] memantine tablet 10 mg    [DISCONTINUED] naloxone 0.4 mg/mL injection 0.02 mg    [DISCONTINUED] pantoprazole EC tablet 40 mg    [DISCONTINUED] polyethylene glycol packet 34 g    [DISCONTINUED] senna-docusate 8.6-50 mg per tablet 1 tablet    [DISCONTINUED] sodium chloride 0.9% flush 10 mL    [DISCONTINUED] traMADoL tablet 50 mg    [DISCONTINUED] traZODone tablet 150 mg     Current Outpatient Medications on File Prior to Encounter   Medication Sig    acetylcysteine 200 mg/ml, 20%, (MUCOMYST) 200 mg/mL (20 %) nebulizer solution Take 4 mLs by nebulization 2 (two) times daily.    albuterol-ipratropium (DUO-NEB) 2.5 mg-0.5 mg/3 mL nebulizer solution Take 3 mLs by nebulization every 6 (six) hours as needed for Wheezing. Rescue    atorvastatin (LIPITOR) 40 MG tablet Take 1 tablet (40 mg total) by mouth once daily.    busPIRone (BUSPAR) 5 MG Tab Take 2 tablets daily at noon    citalopram (CELEXA) 40 MG tablet TAKE 1 TABLET ONCE DAILY    donepeziL (ARICEPT) 10 MG tablet TAKE 1 TABLET EVERY EVENING    fluticasone propionate (FLONASE) 50 mcg/actuation nasal spray 1 spray (50 mcg total) by Each Nostril route once daily.    levoFLOXacin (LEVAQUIN) 750 MG tablet Take 1 tablet (750 mg total) by mouth once daily.    [START ON 8/4/2024] megestroL (MEGACE) 40 MG Tab Take 1 tablet (40 mg total) by mouth once daily.    memantine (NAMENDA) 10 MG Tab TAKE 1 TABLET TWICE A DAY    pantoprazole (PROTONIX) 40 MG tablet Take 1 tablet (40 mg total) by mouth once daily.    polyethylene glycol (GLYCOLAX) 17 gram PwPk Take 34 g by mouth 2 (two) times daily.    traMADoL (ULTRAM) 50 mg tablet Take 1 tablet (50 mg total) by mouth every 6 (six) hours as needed for Pain.    traZODone (DESYREL) 50 MG tablet TAKE 3 TABLETS (150MG      TOTAL) EVERY EVENING     Family History       Problem Relation (Age of Onset)    Emphysema Father    Heart disease Father    Hyperlipidemia Mother    Hypertension Mother, Father          Tobacco Use     Smoking status: Never     Passive exposure: Never    Smokeless tobacco: Never   Substance and Sexual Activity    Alcohol use: Never    Drug use: Never    Sexual activity: Not Currently     Review of Systems   Constitutional:  Positive for activity change. Negative for fever.   Respiratory:  Negative for cough and shortness of breath.    Cardiovascular:  Negative for chest pain.   Neurological:  Positive for weakness.     Objective:     Vital Signs (Most Recent):  Temp: 97.7 °F (36.5 °C) (08/03/24 1214)  Pulse: 83 (08/03/24 1214)  Resp: 20 (08/03/24 1214)  BP: 121/71 (08/03/24 1214)  SpO2: (!) 94 % (08/03/24 1214) Vital Signs (24h Range):  Temp:  [97.7 °F (36.5 °C)-98.2 °F (36.8 °C)] 97.7 °F (36.5 °C)  Pulse:  [] 83  Resp:  [17-22] 20  SpO2:  [94 %-98 %] 94 %  BP: (120-148)/() 121/71     Weight: 50.9 kg (112 lb 3.2 oz)  Body mass index is 18.67 kg/m².     Physical Exam  Constitutional:       General: She is awake. She is not in acute distress.     Appearance: Normal appearance. She is well-developed, well-groomed and normal weight. She is ill-appearing. She is not toxic-appearing.      Comments: Patient resting with eyes closed, easily aroused by verbal stimuli.     HENT:      Head: Normocephalic and atraumatic.   Cardiovascular:      Rate and Rhythm: Normal rate and regular rhythm.      Pulses: Normal pulses.      Heart sounds: Normal heart sounds. No murmur heard.  Pulmonary:      Effort: Pulmonary effort is normal. No respiratory distress.      Breath sounds: Normal breath sounds. No stridor. No wheezing, rhonchi or rales.   Musculoskeletal:      Cervical back: Normal range of motion. No rigidity.   Skin:     General: Skin is warm and dry.      Capillary Refill: Capillary refill takes less than 2 seconds.   Neurological:      General: No focal deficit present.      Mental Status: Mental status is at baseline. She is confused.      GCS: GCS eye subscore is 3. GCS verbal subscore is 4. GCS motor  subscore is 6.   Psychiatric:         Mood and Affect: Mood normal.         Behavior: Behavior is cooperative.                Significant Labs: All pertinent labs within the past 24 hours have been reviewed.    Significant Imaging: I have reviewed all pertinent imaging results/findings within the past 24 hours.

## 2024-08-03 NOTE — HPI
78 year old pleasantly demented  female presented to our facility today from [facility] for admission into swing bed for generalized weakness and treatment for UTI.  Pt has known hx of dementia, DM, HLD, HTN and CVA.  She went in to the ER at [facility] on 7/27/24 and was diagnosed with UTI, placed on Cefdinir.  She went to PCP clinic on 7/31/24 for decreased PO intake, refusal to take PO antibiotic and increased confusion.  They did lab which showed WBC 13 and BUN 27 for abnormals.  They contacted Dr Freire and had patient direct admitted to hospital services.  While admitted, patient received hydration and was started on Levaquin for UTI.  Due to her weakness, the decision was made to transfer her to our facility to complete her antibiotic therapy and work with PT/OT therapies for help improved strength/coordination.  Patient's daughter was here with her upon arrival, and she has chosen for patient to be DNR.  Pt remains pleasantly confused and is at baseline per family.

## 2024-08-03 NOTE — H&P
[facility] - Medical Surgical Unit  Hospital Medicine  History & Physical    Patient Name: Jane Clark  MRN: [id number]  Patient Class: IP- Swing  Admission Date: 8/3/2024  Attending Physician: Chapito Plaza IV, DO   Primary Care Provider: Didi Singer NP         Patient information was obtained from relative(s), past medical records, and ER records.     Subjective:     Principal Problem:Generalized weakness    Chief Complaint: No chief complaint on file.       HPI: 78 year old pleasantly demented  female presented to our facility today from [facility] for admission into swing bed for generalized weakness and treatment for UTI.  Pt has known hx of dementia, DM, HLD, HTN and CVA.  She went in to the ER at [facility] on 7/27/24 and was diagnosed with UTI, placed on Cefdinir.  She went to PCP clinic on 7/31/24 for decreased PO intake, refusal to take PO antibiotic and increased confusion.  They did lab which showed WBC 13 and BUN 27 for abnormals.  They contacted Dr Freire and had patient direct admitted to hospital services.  While admitted, patient received hydration and was started on Levaquin for UTI.  Due to her weakness, the decision was made to transfer her to our facility to complete her antibiotic therapy and work with PT/OT therapies for help improved strength/coordination.  Patient's daughter was here with her upon arrival, and she has chosen for patient to be DNR.  Pt remains pleasantly confused and is at baseline per family.      Past Medical History:   Diagnosis Date    Achrochordon     Aphasia as late effect of cerebrovascular accident     Cervicalgia     Diabetes mellitus, type 2     Hiatal hernia with GERD     Insomnia secondary to depression with anxiety     Mixed hyperlipidemia 03/02/2022    Primary hypertension 03/02/2022    Senile dementia with behavioral disturbance     Sleep related leg cramps     Stroke     TMJ (dislocation of temporomandibular joint)      Vascular dementia with behavior disturbance 03/02/2022    Vitamin B 12 deficiency     Vitamin D deficiency        Past Surgical History:   Procedure Laterality Date    BREAST BIOPSY      rt.core biopsy    carotid dopple complete      CHOLECYSTECTOMY      COLONOSCOPY  01/10/2017    repeat in 5 years    ESOPHAGOGASTRODUODENOSCOPY  11/29/2016    HYSTERECTOMY         Review of patient's allergies indicates:   Allergen Reactions    Sulfa (sulfonamide antibiotics) Anaphylaxis    Codeine Nausea Only       Current Facility-Administered Medications on File Prior to Encounter   Medication    [COMPLETED] haloperidol lactate injection 5 mg    [COMPLETED] ziprasidone injection 10 mg    [DISCONTINUED] 0.9%  NaCl infusion    [DISCONTINUED] acetylcysteine 200 mg/ml (20%) solution 4 mL    [DISCONTINUED] albuterol-ipratropium 2.5 mg-0.5 mg/3 mL nebulizer solution 3 mL    [DISCONTINUED] atorvastatin tablet 40 mg    [DISCONTINUED] busPIRone tablet 10 mg    [DISCONTINUED] citalopram tablet 20 mg    [DISCONTINUED] dextrose 10% bolus 125 mL 125 mL    [DISCONTINUED] dextrose 10% bolus 250 mL 250 mL    [DISCONTINUED] donepeziL tablet 10 mg    [DISCONTINUED] fluticasone propionate 50 mcg/actuation nasal spray 50 mcg    [DISCONTINUED] glucagon (human recombinant) injection 1 mg    [DISCONTINUED] glucose chewable tablet 16 g    [DISCONTINUED] glucose chewable tablet 24 g    [DISCONTINUED] heparin (porcine) injection 5,000 Units    [DISCONTINUED] levoFLOXacin tablet 750 mg    [DISCONTINUED] megestroL tablet 40 mg    [DISCONTINUED] memantine tablet 10 mg    [DISCONTINUED] naloxone 0.4 mg/mL injection 0.02 mg    [DISCONTINUED] pantoprazole EC tablet 40 mg    [DISCONTINUED] polyethylene glycol packet 34 g    [DISCONTINUED] senna-docusate 8.6-50 mg per tablet 1 tablet    [DISCONTINUED] sodium chloride 0.9% flush 10 mL    [DISCONTINUED] traMADoL tablet 50 mg    [DISCONTINUED] traZODone tablet 150 mg     Current Outpatient Medications on File Prior  to Encounter   Medication Sig    acetylcysteine 200 mg/ml, 20%, (MUCOMYST) 200 mg/mL (20 %) nebulizer solution Take 4 mLs by nebulization 2 (two) times daily.    albuterol-ipratropium (DUO-NEB) 2.5 mg-0.5 mg/3 mL nebulizer solution Take 3 mLs by nebulization every 6 (six) hours as needed for Wheezing. Rescue    atorvastatin (LIPITOR) 40 MG tablet Take 1 tablet (40 mg total) by mouth once daily.    busPIRone (BUSPAR) 5 MG Tab Take 2 tablets daily at noon    citalopram (CELEXA) 40 MG tablet TAKE 1 TABLET ONCE DAILY    donepeziL (ARICEPT) 10 MG tablet TAKE 1 TABLET EVERY EVENING    fluticasone propionate (FLONASE) 50 mcg/actuation nasal spray 1 spray (50 mcg total) by Each Nostril route once daily.    levoFLOXacin (LEVAQUIN) 750 MG tablet Take 1 tablet (750 mg total) by mouth once daily.    [START ON 8/4/2024] megestroL (MEGACE) 40 MG Tab Take 1 tablet (40 mg total) by mouth once daily.    memantine (NAMENDA) 10 MG Tab TAKE 1 TABLET TWICE A DAY    pantoprazole (PROTONIX) 40 MG tablet Take 1 tablet (40 mg total) by mouth once daily.    polyethylene glycol (GLYCOLAX) 17 gram PwPk Take 34 g by mouth 2 (two) times daily.    traMADoL (ULTRAM) 50 mg tablet Take 1 tablet (50 mg total) by mouth every 6 (six) hours as needed for Pain.    traZODone (DESYREL) 50 MG tablet TAKE 3 TABLETS (150MG      TOTAL) EVERY EVENING     Family History       Problem Relation (Age of Onset)    Emphysema Father    Heart disease Father    Hyperlipidemia Mother    Hypertension Mother, Father          Tobacco Use    Smoking status: Never     Passive exposure: Never    Smokeless tobacco: Never   Substance and Sexual Activity    Alcohol use: Never    Drug use: Never    Sexual activity: Not Currently     Review of Systems   Constitutional:  Positive for activity change. Negative for fever.   Respiratory:  Negative for cough and shortness of breath.    Cardiovascular:  Negative for chest pain.   Neurological:  Positive for weakness.     Objective:      Vital Signs (Most Recent):  Temp: 97.7 °F (36.5 °C) (08/03/24 1214)  Pulse: 83 (08/03/24 1214)  Resp: 20 (08/03/24 1214)  BP: 121/71 (08/03/24 1214)  SpO2: (!) 94 % (08/03/24 1214) Vital Signs (24h Range):  Temp:  [97.7 °F (36.5 °C)-98.2 °F (36.8 °C)] 97.7 °F (36.5 °C)  Pulse:  [] 83  Resp:  [17-22] 20  SpO2:  [94 %-98 %] 94 %  BP: (120-148)/() 121/71     Weight: 50.9 kg (112 lb 3.2 oz)  Body mass index is 18.67 kg/m².     Physical Exam  Constitutional:       General: She is awake. She is not in acute distress.     Appearance: Normal appearance. She is well-developed, well-groomed and normal weight. She is ill-appearing. She is not toxic-appearing.      Comments: Patient resting with eyes closed, easily aroused by verbal stimuli.     HENT:      Head: Normocephalic and atraumatic.   Cardiovascular:      Rate and Rhythm: Normal rate and regular rhythm.      Pulses: Normal pulses.      Heart sounds: Normal heart sounds. No murmur heard.  Pulmonary:      Effort: Pulmonary effort is normal. No respiratory distress.      Breath sounds: Normal breath sounds. No stridor. No wheezing, rhonchi or rales.   Musculoskeletal:      Cervical back: Normal range of motion. No rigidity.   Skin:     General: Skin is warm and dry.      Capillary Refill: Capillary refill takes less than 2 seconds.   Neurological:      General: No focal deficit present.      Mental Status: Mental status is at baseline. She is confused.      GCS: GCS eye subscore is 3. GCS verbal subscore is 4. GCS motor subscore is 6.   Psychiatric:         Mood and Affect: Mood normal.         Behavior: Behavior is cooperative.                Significant Labs: All pertinent labs within the past 24 hours have been reviewed.    Significant Imaging: I have reviewed all pertinent imaging results/findings within the past 24 hours.  Assessment/Plan:     * Generalized weakness    PT/OT assessment    UTI (urinary tract infection)    Continue her Levaquin for 5  days    Dementia with behavioral disturbance  Dementia is moderate. The patient does not have signs of behavioral disturbance. Home dementia medications are Held or Continued: continued.. Continue non-pharmacologic interventions to prevent delirium (No VS between 11PM-5AM, activity during day, opening blinds, providing glasses/hearing aids, and up in chair during daytime). Will avoid narcotics and benzos unless absolutely necessary.     Primary hypertension  Chronic, stable. Latest blood pressure and vitals reviewed-     Temp:  [97.7 °F (36.5 °C)-98.2 °F (36.8 °C)]   Pulse:  []   Resp:  [17-22]   BP: (120-148)/()   SpO2:  [94 %-98 %] .   Home meds for hypertension were reviewed and noted below.       While in the hospital, will manage blood pressure as follows; Continue home antihypertensive regimen      Mixed hyperlipidemia  Continue atorvastatin        VTE Risk Mitigation (From admission, onward)           Ordered     enoxaparin injection 30 mg  Daily         08/03/24 1320     IP VTE HIGH RISK PATIENT  Once         08/03/24 1320     Place sequential compression device  Until discontinued         08/03/24 1320                                    MASON Bowie  Department of Hospital Medicine  [facility] - [facility] Surgical [facility]

## 2024-08-03 NOTE — ASSESSMENT & PLAN NOTE
Chronic, stable. Latest blood pressure and vitals reviewed-     Temp:  [97.7 °F (36.5 °C)-98.2 °F (36.8 °C)]   Pulse:  []   Resp:  [17-22]   BP: (120-148)/()   SpO2:  [94 %-98 %] .   Home meds for hypertension were reviewed and noted below.       While in the hospital, will manage blood pressure as follows; Continue home antihypertensive regimen

## 2024-08-03 NOTE — ASSESSMENT & PLAN NOTE
Dementia is moderate. The patient does not have signs of behavioral disturbance. Home dementia medications are Held or Continued: continued.. Continue non-pharmacologic interventions to prevent delirium (No VS between 11PM-5AM, activity during day, opening blinds, providing glasses/hearing aids, and up in chair during daytime). Will avoid narcotics and benzos unless absolutely necessary.

## 2024-08-04 PROCEDURE — 63600175 PHARM REV CODE 636 W HCPCS: Performed by: NURSE PRACTITIONER

## 2024-08-04 PROCEDURE — 27000944

## 2024-08-04 PROCEDURE — 94640 AIRWAY INHALATION TREATMENT: CPT

## 2024-08-04 PROCEDURE — 25000003 PHARM REV CODE 250: Performed by: NURSE PRACTITIONER

## 2024-08-04 PROCEDURE — 94761 N-INVAS EAR/PLS OXIMETRY MLT: CPT

## 2024-08-04 PROCEDURE — 27000982 HC MATTRESS, MATRIX LAL RENTAL

## 2024-08-04 PROCEDURE — 99900031 HC PATIENT EDUCATION (STAT)

## 2024-08-04 PROCEDURE — 25000242 PHARM REV CODE 250 ALT 637 W/ HCPCS: Performed by: NURSE PRACTITIONER

## 2024-08-04 PROCEDURE — 11000004 HC SNF PRIVATE

## 2024-08-04 PROCEDURE — 25000242 PHARM REV CODE 250 ALT 637 W/ HCPCS: Performed by: FAMILY MEDICINE

## 2024-08-04 RX ORDER — ACETYLCYSTEINE 200 MG/ML
4 SOLUTION ORAL; RESPIRATORY (INHALATION) EVERY 12 HOURS
Status: DISCONTINUED | OUTPATIENT
Start: 2024-08-04 | End: 2024-08-07

## 2024-08-04 RX ADMIN — CITALOPRAM HYDROBROMIDE 40 MG: 20 TABLET ORAL at 10:08

## 2024-08-04 RX ADMIN — PANTOPRAZOLE SODIUM 40 MG: 40 TABLET, DELAYED RELEASE ORAL at 10:08

## 2024-08-04 RX ADMIN — TRAZODONE HYDROCHLORIDE 150 MG: 50 TABLET ORAL at 08:08

## 2024-08-04 RX ADMIN — IPRATROPIUM BROMIDE AND ALBUTEROL SULFATE 3 ML: 2.5; .5 SOLUTION RESPIRATORY (INHALATION) at 07:08

## 2024-08-04 RX ADMIN — BUSPIRONE HYDROCHLORIDE 10 MG: 5 TABLET ORAL at 12:08

## 2024-08-04 RX ADMIN — IPRATROPIUM BROMIDE AND ALBUTEROL SULFATE 3 ML: 2.5; .5 SOLUTION RESPIRATORY (INHALATION) at 08:08

## 2024-08-04 RX ADMIN — POLYETHYLENE GLYCOL 3350 34 G: 17 POWDER, FOR SOLUTION ORAL at 10:08

## 2024-08-04 RX ADMIN — LEVOFLOXACIN 750 MG: 500 TABLET, FILM COATED ORAL at 10:08

## 2024-08-04 RX ADMIN — MEGESTROL ACETATE 40 MG: 40 TABLET ORAL at 10:08

## 2024-08-04 RX ADMIN — ATORVASTATIN CALCIUM 40 MG: 40 TABLET, FILM COATED ORAL at 10:08

## 2024-08-04 RX ADMIN — FLUTICASONE PROPIONATE 50 MCG: 50 SPRAY, METERED NASAL at 10:08

## 2024-08-04 RX ADMIN — ENOXAPARIN SODIUM 30 MG: 30 INJECTION SUBCUTANEOUS at 04:08

## 2024-08-04 RX ADMIN — TRAMADOL HYDROCHLORIDE 50 MG: 50 TABLET, COATED ORAL at 11:08

## 2024-08-04 RX ADMIN — MEMANTINE 10 MG: 5 TABLET ORAL at 08:08

## 2024-08-04 RX ADMIN — DONEPEZIL HYDROCHLORIDE 10 MG: 5 TABLET ORAL at 08:08

## 2024-08-04 RX ADMIN — MEMANTINE 10 MG: 5 TABLET ORAL at 10:08

## 2024-08-04 RX ADMIN — POLYETHYLENE GLYCOL 3350 34 G: 17 POWDER, FOR SOLUTION ORAL at 08:08

## 2024-08-04 RX ADMIN — ACETYLCYSTEINE 4 ML: 200 INHALANT RESPIRATORY (INHALATION) at 08:08

## 2024-08-04 RX ADMIN — ACETYLCYSTEINE 4 ML: 200 INHALANT RESPIRATORY (INHALATION) at 07:08

## 2024-08-04 NOTE — PLAN OF CARE
Problem: Adult Inpatient Plan of Care  Goal: Optimal Comfort and Wellbeing  Outcome: Progressing

## 2024-08-04 NOTE — PLAN OF CARE
Problem: Adult Inpatient Plan of Care  Goal: Plan of Care Review  8/4/2024 1837 by Neil Morel CRTT  Outcome: Progressing  8/4/2024 1837 by Neil Morel CRTT  Outcome: Progressing  Goal: Patient-Specific Goal (Individualized)  8/4/2024 1837 by Neil Morel CRTT  Outcome: Progressing  8/4/2024 1837 by Neil Morel CRTT  Outcome: Progressing  Goal: Absence of Hospital-Acquired Illness or Injury  8/4/2024 1837 by Neil Morel CRTT  Outcome: Progressing  8/4/2024 1837 by Neil Morel CRTT  Outcome: Progressing  Goal: Optimal Comfort and Wellbeing  8/4/2024 1837 by Neil Morel CRTT  Outcome: Progressing  8/4/2024 1837 by Neil Morel CRTT  Outcome: Progressing

## 2024-08-04 NOTE — CONSULTS
[facility]   Adult Nutrition  Consult Note         Reason for Assessment  Reason For Assessment: consult weight loss  Nutrition Risk Screen: no indicators present    Assessment and Plan  8/4/2024 Consult received and appreciated: Patient admitted to [facility] on 8/3 from [facility]. Noted plans for hospice on d/c from [facility].    Patient ordered a dysphagia pureed diet. Limited po intakes documented per flowsheets. 75% documentation noted. Recommend to resume Boost Plus all meals. Encourage po intakes and assist with all meals as needed. RD Following.       Consult received and appreciated. Patient admitted 7/31 with a dx of metabolic encephalopathy, UTI, anemia, and alzheimer's dementia. Patient is ordered a dysphagia pureed diet. No po intakes documented per flowsheets.     Patient is 113 pounds with a BMI of 18.80 which is underweight per geriatric standards. Per chart review patient has had significant weight loss of 14% over the last 6 months. Poor po intakes endorsed from family per MST screen noted. Due to poor po intakes and significant weight loss, patient meets ASPEN criteria for severe protein calorie malnutrition.     Wt Readings from Last 12 Encounters:   08/03/24 50.9 kg (112 lb 3.2 oz)   07/31/24 51.3 kg (113 lb)   07/31/24 51.3 kg (113 lb)   07/27/24 52.2 kg (115 lb)   07/18/24 51.3 kg (113 lb)   07/10/24 52.2 kg (115 lb)   03/21/24 55.3 kg (122 lb)   03/12/24 54.9 kg (121 lb)   02/09/24 54.4 kg (120 lb)   02/05/24 59.9 kg (132 lb)   01/10/24 59.9 kg (132 lb)   12/19/23 60 kg (132 lb 4.8 oz)       Recommend to add Boost Plus all meals, assist with all meals as needed. RD Following.       Learning Needs/Social Determinants of Health  Learning Assessment       07/31/2024 7422 [facility] (7/31/2024 - Present)   Created by Niesha Alcantar RN - RN (Nurse) Status: Complete                 PRIMARY LEARNER     Primary Learner Name:  Eduardo Lara  - 07/31/2024 [id number]     Relationship:  Patient RC - 07/31/2024 1535    Does the primary learner have any barriers to learning?:  Cognitive RC - 07/31/2024 1535    What is the preferred language of the primary learner?:  English RC - 07/31/2024 1535    Is an  required?:  No RC - 07/31/2024 1535    How does the primary learner prefer to learn new concepts?:  Listening, Reading, Demonstration RC - 07/31/2024 1535    How often do you need to have someone help you read instructions, pamphlets, or written material from your doctor or pharmacy?:  Often RC - 07/31/2024 1535        CO-LEARNER #1     No question answered        CO-LEARNER #2     No question answered        SPECIAL TOPICS     No question answered        ANSWERED BY:     No question answered        Comments         Edit History       Niesha Alcantar RN - RN (Nurse)   07/31/2024 1535                          Social Determinants of Health     Tobacco Use: Low Risk  (7/31/2024)    Patient History     Smoking Tobacco Use: Never     Smokeless Tobacco Use: Never     Passive Exposure: Never   Alcohol Use: Not At Risk (8/1/2024)    AUDIT-C     Frequency of Alcohol Consumption: Never     Average Number of Drinks: Patient does not drink     Frequency of Binge Drinking: Never   Financial Resource Strain: Low Risk  (8/1/2024)    Overall Financial Resource Strain (CARDIA)     Difficulty of Paying Living Expenses: Not hard at all   Food Insecurity: No Food Insecurity (8/1/2024)    Hunger Vital Sign     Worried About Running Out of Food in the Last Year: Never true     Ran Out of Food in the Last Year: Never true   Transportation Needs: No Transportation Needs (8/1/2024)    TRANSPORTATION NEEDS     Transportation : No   Physical Activity: Inactive (8/1/2024)    Exercise Vital Sign     Days of Exercise per Week: 0 days     Minutes of Exercise per Session: 0 min   Stress: No Stress Concern Present (8/1/2024)    [employer] - Occupational Stress Questionnaire      Feeling of Stress : Not at all   Housing Stability: Low Risk  (8/1/2024)    Housing Stability Vital Sign     Unable to Pay for Housing in the Last Year: No     Homeless in the Last Year: No   Depression: Low Risk  (7/31/2024)    Depression     Last PHQ-4: Flowsheet Data: 0   Utilities: Not At Risk (8/1/2024)    [facility] Utilities     Threatened with loss of utilities: No   Health Literacy: Not on file   Social Isolation: Socially Integrated (8/1/2024)    Social Isolation     Social Isolation: 1            Malnutrition  Is Patient Malnourished: Yes Malnutrition Assessment  Malnutrition Context: chronic illness  Malnutrition Level: severe          Weight Loss (Malnutrition): greater than 10% in 6 months  Energy Intake (Malnutrition): less than or equal to 75% for greater than or equal to 1 month                         Nutrition Diagnosis  Malnutrition (Severe) related to Appetite loss, Decreased ability to consume sufficient energy, and Impaired cognitive or learning abilities /psychological causes/ Altered Mental Status as evidenced by significant weight loss of greater than 10% in 6 months and energy intake less than or equal 75% for greater than or equal to 1 month  Comments: add Boost Plus. Encourage po intakes, Nursing assist with all meals as needed    Recent Labs   Lab 08/03/24  0231   GLU 96     Comments on Glucose: WNL    Nutrition Prescription / Recommendations  Nutrition Goal Status: new     Chewing or Swallowing Difficulty?: pureed diet  Recommended Diet: Puree  Recommended Oral Supplement: Boost Plus [360kcals, 14g Protein, 45g Carbs] 3 times a day  Is Nutrition Support Recommended: [facility] Support: No  Is Nutrition Education Recommended: No    Monitor and Evaluation  % current Intake: New Diet order with no P.O. intake documented currently  % intake to meet estimated needs: 50 - 75 %          Current Medical Diagnosis and Past Medical History     Past Medical History:   Diagnosis Date     Achrochordon     Aphasia as late effect of cerebrovascular accident     Cervicalgia     Diabetes mellitus, type 2     Hiatal hernia with GERD     Insomnia secondary to depression with anxiety     Mixed hyperlipidemia 03/02/2022    Primary hypertension 03/02/2022    Senile dementia with behavioral disturbance     Sleep related leg cramps     Stroke     TMJ (dislocation of temporomandibular joint)     Vascular dementia with behavior disturbance 03/02/2022    Vitamin B 12 deficiency     Vitamin D deficiency        Nutrition/Diet History  Spiritual, Cultural Beliefs, Spiritism Practices, Values that Affect Care: no    Lab/Procedures/Meds  Recent Labs   Lab 08/03/24  0231      K 3.6   BUN 15   CREATININE 0.66   CALCIUM 9.4      PHOS 3.1     Last A1c:   Lab Results   Component Value Date    HGBA1C 5.5 11/21/2023     Lab Results   Component Value Date    RBC 3.69 (L) 08/03/2024    HGB 10.4 (L) 08/03/2024    HCT 33.0 (L) 08/03/2024    MCV 89.4 08/03/2024    MCH 28.2 08/03/2024    MCHC 31.5 (L) 08/03/2024     Pertinent Labs Reviewed: reviewed  Pertinent Medications Reviewed: reviewed  Scheduled Meds:   acetylcysteine 200 mg/ml (20%)  4 mL Nebulization BID    atorvastatin  40 mg Oral Daily    busPIRone  10 mg Oral Daily    citalopram  40 mg Oral Daily    donepeziL  10 mg Oral QHS    enoxparin  30 mg Subcutaneous Daily    fluticasone propionate  1 spray Each Nostril Daily    levoFLOXacin  750 mg Oral Daily    megestroL  40 mg Oral Daily    memantine  10 mg Oral BID    pantoprazole  40 mg Oral Daily    polyethylene glycol  34 g Oral BID    traZODone  150 mg Oral QHS     Continuous Infusions:      PRN Meds:.  Current Facility-Administered Medications:     acetaminophen, 650 mg, Oral, Q6H PRN    albuterol-ipratropium, 3 mL, Nebulization, Q6H PRN    calcium carbonate, 500 mg, Oral, BID PRN    ondansetron, 4 mg, Oral, Q8H PRN    senna-docusate 8.6-50 mg, 1 tablet, Oral, BID PRN    traMADoL, 50 mg, Oral, Q6H  "PRN    Anthropometrics  Temp: 97.9 °F (36.6 °C)  Height Method: Stated  Height: 5' 5" (165.1 cm)  Height (inches): 65 in  Weight Method: Standard Scale  Weight: 50.9 kg (112 lb 3.2 oz)  Weight (lb): 112.2 lb  Ideal Body Weight (IBW), Female: 125 lb  % Ideal Body Weight, Female (lb): 89.76 %  BMI (Calculated): 18.7       Estimated/Assessed Needs      Temp: 97.9 °F (36.6 °C)Oral  Weight Used For Calorie Calculations: 50.9 kg (112 lb 3.4 oz)   Energy Need Method: Kcal/kg Energy Calorie Requirements (kcal): 3461-7436  Weight Used For Protein Calculations: 50.9 kg (112 lb 3.4 oz)  Protein Requirements: 51-61  Estimated Fluid Requirement Method: RDA Method    RDA Method (mL): 1273       Nutrition by Nursing              Last Bowel Movement: 08/01/24                Nutrition Follow-Up  RD Follow-up?: Yes                 Available via Secure Chat  "

## 2024-08-05 LAB
ANION GAP SERPL CALCULATED.3IONS-SCNC: 12 MMOL/L (ref 7–16)
BASOPHILS # BLD AUTO: 0.05 K/UL (ref 0–0.2)
BASOPHILS NFR BLD AUTO: 0.6 % (ref 0–1)
BUN SERPL-MCNC: 11 MG/DL (ref 7–18)
BUN/CREAT SERPL: 14 (ref 6–20)
CALCIUM SERPL-MCNC: 9 MG/DL (ref 8.5–10.1)
CHLORIDE SERPL-SCNC: 101 MMOL/L (ref 98–107)
CO2 SERPL-SCNC: 29 MMOL/L (ref 21–32)
CREAT SERPL-MCNC: 0.78 MG/DL (ref 0.55–1.02)
DIFFERENTIAL METHOD BLD: ABNORMAL
EGFR (NO RACE VARIABLE) (RUSH/TITUS): 78 ML/MIN/1.73M2
EOSINOPHIL # BLD AUTO: 0.09 K/UL (ref 0–0.5)
EOSINOPHIL NFR BLD AUTO: 1 % (ref 1–4)
ERYTHROCYTE [DISTWIDTH] IN BLOOD BY AUTOMATED COUNT: 12.6 % (ref 11.5–14.5)
GLUCOSE SERPL-MCNC: 116 MG/DL (ref 74–106)
GLUCOSE SERPL-MCNC: 181 MG/DL (ref 70–105)
HCT VFR BLD AUTO: 37.1 % (ref 38–47)
HGB BLD-MCNC: 12.5 G/DL (ref 12–16)
IMM GRANULOCYTES # BLD AUTO: 0.07 K/UL (ref 0–0.04)
IMM GRANULOCYTES NFR BLD: 0.8 % (ref 0–0.4)
LYMPHOCYTES # BLD AUTO: 1.92 K/UL (ref 1–4.8)
LYMPHOCYTES NFR BLD AUTO: 21.6 % (ref 27–41)
MAGNESIUM SERPL-MCNC: 2.3 MG/DL (ref 1.7–2.3)
MCH RBC QN AUTO: 29.1 PG (ref 27–31)
MCHC RBC AUTO-ENTMCNC: 33.7 G/DL (ref 32–36)
MCV RBC AUTO: 86.5 FL (ref 80–96)
MONOCYTES # BLD AUTO: 0.52 K/UL (ref 0–0.8)
MONOCYTES NFR BLD AUTO: 5.9 % (ref 2–6)
MPC BLD CALC-MCNC: 9 FL (ref 9.4–12.4)
NEUTROPHILS # BLD AUTO: 6.22 K/UL (ref 1.8–7.7)
NEUTROPHILS NFR BLD AUTO: 70.1 % (ref 53–65)
NRBC # BLD AUTO: 0 X10E3/UL
NRBC, AUTO (.00): 0 %
PHOSPHATE SERPL-MCNC: 4.4 MG/DL (ref 2.5–4.5)
PLATELET # BLD AUTO: 281 K/UL (ref 150–400)
POTASSIUM SERPL-SCNC: 3.6 MMOL/L (ref 3.5–5.1)
RBC # BLD AUTO: 4.29 M/UL (ref 4.2–5.4)
SODIUM SERPL-SCNC: 138 MMOL/L (ref 136–145)
WBC # BLD AUTO: 8.87 K/UL (ref 4.5–11)

## 2024-08-05 PROCEDURE — 94761 N-INVAS EAR/PLS OXIMETRY MLT: CPT

## 2024-08-05 PROCEDURE — 82962 GLUCOSE BLOOD TEST: CPT

## 2024-08-05 PROCEDURE — 83735 ASSAY OF MAGNESIUM: CPT | Performed by: NURSE PRACTITIONER

## 2024-08-05 PROCEDURE — 97166 OT EVAL MOD COMPLEX 45 MIN: CPT

## 2024-08-05 PROCEDURE — 25000242 PHARM REV CODE 250 ALT 637 W/ HCPCS: Performed by: NURSE PRACTITIONER

## 2024-08-05 PROCEDURE — 80048 BASIC METABOLIC PNL TOTAL CA: CPT | Performed by: NURSE PRACTITIONER

## 2024-08-05 PROCEDURE — 97162 PT EVAL MOD COMPLEX 30 MIN: CPT

## 2024-08-05 PROCEDURE — 36415 COLL VENOUS BLD VENIPUNCTURE: CPT | Performed by: NURSE PRACTITIONER

## 2024-08-05 PROCEDURE — 85025 COMPLETE CBC W/AUTO DIFF WBC: CPT | Performed by: NURSE PRACTITIONER

## 2024-08-05 PROCEDURE — 27000944

## 2024-08-05 PROCEDURE — 11000004 HC SNF PRIVATE

## 2024-08-05 PROCEDURE — 27000982 HC MATTRESS, MATRIX LAL RENTAL

## 2024-08-05 PROCEDURE — 25000242 PHARM REV CODE 250 ALT 637 W/ HCPCS: Performed by: FAMILY MEDICINE

## 2024-08-05 PROCEDURE — 63600175 PHARM REV CODE 636 W HCPCS: Performed by: FAMILY MEDICINE

## 2024-08-05 PROCEDURE — 84100 ASSAY OF PHOSPHORUS: CPT | Performed by: NURSE PRACTITIONER

## 2024-08-05 PROCEDURE — 94640 AIRWAY INHALATION TREATMENT: CPT

## 2024-08-05 PROCEDURE — 25000003 PHARM REV CODE 250: Performed by: NURSE PRACTITIONER

## 2024-08-05 PROCEDURE — 25000003 PHARM REV CODE 250: Performed by: FAMILY MEDICINE

## 2024-08-05 PROCEDURE — 99900035 HC TECH TIME PER 15 MIN (STAT)

## 2024-08-05 RX ORDER — SELENIUM 50 MCG
1 TABLET ORAL
Status: DISCONTINUED | OUTPATIENT
Start: 2024-08-05 | End: 2024-08-09

## 2024-08-05 RX ORDER — ENOXAPARIN SODIUM 100 MG/ML
40 INJECTION SUBCUTANEOUS EVERY 24 HOURS
Status: DISCONTINUED | OUTPATIENT
Start: 2024-08-05 | End: 2024-08-13 | Stop reason: HOSPADM

## 2024-08-05 RX ORDER — TRAMADOL HYDROCHLORIDE 50 MG/1
50 TABLET ORAL EVERY 6 HOURS PRN
Status: DISCONTINUED | OUTPATIENT
Start: 2024-08-05 | End: 2024-08-12

## 2024-08-05 RX ADMIN — Medication 1 CAPSULE: at 04:08

## 2024-08-05 RX ADMIN — PANTOPRAZOLE SODIUM 40 MG: 40 TABLET, DELAYED RELEASE ORAL at 09:08

## 2024-08-05 RX ADMIN — TRAZODONE HYDROCHLORIDE 150 MG: 50 TABLET ORAL at 08:08

## 2024-08-05 RX ADMIN — TRAMADOL HYDROCHLORIDE 50 MG: 50 TABLET, COATED ORAL at 10:08

## 2024-08-05 RX ADMIN — CITALOPRAM HYDROBROMIDE 40 MG: 20 TABLET ORAL at 09:08

## 2024-08-05 RX ADMIN — ACETYLCYSTEINE 4 ML: 200 INHALANT RESPIRATORY (INHALATION) at 07:08

## 2024-08-05 RX ADMIN — FLUTICASONE PROPIONATE 50 MCG: 50 SPRAY, METERED NASAL at 10:08

## 2024-08-05 RX ADMIN — MEMANTINE 10 MG: 5 TABLET ORAL at 09:08

## 2024-08-05 RX ADMIN — IPRATROPIUM BROMIDE AND ALBUTEROL SULFATE 3 ML: 2.5; .5 SOLUTION RESPIRATORY (INHALATION) at 07:08

## 2024-08-05 RX ADMIN — LEVOFLOXACIN 750 MG: 500 TABLET, FILM COATED ORAL at 09:08

## 2024-08-05 RX ADMIN — ENOXAPARIN SODIUM 40 MG: 40 INJECTION SUBCUTANEOUS at 04:08

## 2024-08-05 RX ADMIN — MEMANTINE 10 MG: 5 TABLET ORAL at 08:08

## 2024-08-05 RX ADMIN — MEGESTROL ACETATE 40 MG: 40 TABLET ORAL at 09:08

## 2024-08-05 RX ADMIN — Medication 1 CAPSULE: at 11:08

## 2024-08-05 RX ADMIN — DONEPEZIL HYDROCHLORIDE 10 MG: 5 TABLET ORAL at 08:08

## 2024-08-05 RX ADMIN — ATORVASTATIN CALCIUM 40 MG: 40 TABLET, FILM COATED ORAL at 09:08

## 2024-08-05 RX ADMIN — BUSPIRONE HYDROCHLORIDE 10 MG: 5 TABLET ORAL at 11:08

## 2024-08-05 RX ADMIN — POLYETHYLENE GLYCOL 3350 34 G: 17 POWDER, FOR SOLUTION ORAL at 09:08

## 2024-08-05 NOTE — PLAN OF CARE
[facility] - Medical Surgical Unit  Initial Discharge Assessment       Primary Care Provider: Didi Singer NP    Admission Diagnosis: Generalized weakness [R53.1]    Admission Date: 8/3/2024  Expected Discharge Date:     Transition of Care Barriers: (P) None    Payor: MEDICARE / Plan: MEDICARE PART A & B / Product Type: Government /     Extended Emergency Contact Information  Primary Emergency Contact: jacinda parker   [place]  Mobile Phone: [phone]  Relation: Daughter   needed? No  Secondary Emergency Contact: john bardalese  Address: [address] [place], [place] [address] [place]  Mobile Phone: [phone]  Relation: Son   needed? No    Discharge Plan A: (P) Home with family, Hospice/home         [facility] [address] [address]  Phone: [phone] Fax: [phone]    [facility] [place] [id number] [address] [place], [place] [address]  Phone: [phone] Fax: [phone]      Initial Assessment (most recent)       Adult Discharge Assessment - 08/05/24 0956          Discharge Assessment    Assessment Type Discharge Planning Assessment (P)      Confirmed/corrected address, phone number and insurance Yes (P)      Confirmed Demographics Correct on Facesheet (P)      Source of Information health record (P)      Communicated JUDE with patient/caregiver Date not available/Unable to determine (P)      Reason For Admission Generalized Weakness (P)      People in Home child(nelli), adult (P)      Facility Arrived From: [facility] (P)      Do you expect to return to your current living situation? Yes (P)      Do you have help at home or someone to help you manage your care at home? Yes (P)      Who are your caregiver(s) and their phone number(s)? Harjinder [phone] and Jacinda [phone] (P)      Current cognitive status: Not Oriented to Place;Not Oriented to  Time (P)      Walking or Climbing Stairs Difficulty yes (P)      Walking or Climbing Stairs ambulation difficulty, assistance 1 person (P)      Mobility Management Roling walker (P)      Dressing/Bathing Difficulty yes (P)      Dressing/Bathing Management has home health and asst from family (P)      Equipment Currently Used at Home walker, rolling (P)      Patient currently being followed by outpatient case management? No (P)      Do you currently have service(s) that help you manage your care at home? Yes (P)      Name and Contact number of agency [facility] (P)      Is the pt/caregiver preference to resume services with current agency No (P)      Do you take prescription medications? Yes (P)      Do you have prescription coverage? Yes (P)      Coverage Medicare (P)      Do you have any problems affording any of your prescribed medications? No (P)      Is the patient taking medications as prescribed? yes (P)      Who is going to help you get home at discharge? adult children (P)      How do you get to doctors appointments? family or friend will provide (P)      Are you on dialysis? No (P)      Do you take coumadin? No (P)      Discharge Plan A Home with family;Hospice/home (P)      DME Needed Upon Discharge  none (P)      Transition of Care Barriers None (P)         Physical Activity    On average, how many days per week do you engage in moderate to strenuous exercise (like a brisk walk)? 0 days (P)      On average, how many minutes do you engage in exercise at this level? 0 min (P)         Financial Resource Strain    How hard is it for you to pay for the very basics like food, housing, medical care, and heating? Not hard at all (P)         Housing Stability    In the last 12 months, was there a time when you were not able to pay the mortgage or rent on time? No (P)      At any time in the past 12 months, were you homeless or living in a shelter (including now)? No (P)         Transportation Needs    Has the  lack of transportation kept you from medical appointments, meetings, work or from getting things needed for daily living? No (P)         Food Insecurity    Within the past 12 months, you worried that your food would run out before you got the money to buy more. Never true (P)      Within the past 12 months, the food you bought just didn't last and you didn't have money to get more. Never true (P)         Stress    Do you feel stress - tense, restless, nervous, or anxious, or unable to sleep at night because your mind is troubled all the time - these days? Not at all (P)         Social Isolation    How often do you feel lonely or isolated from those around you?  Never (P)         Alcohol Use    Q1: How often do you have a drink containing alcohol? Never (P)      Q2: How many drinks containing alcohol do you have on a typical day when you are drinking? Patient does not drink (P)      Q3: How often do you have six or more drinks on one occasion? Never (P)         CyberDefender    In the past 12 months has the electric, gas, oil, or water company threatened to shut off services in your home? No (P)         Health Literacy    How often do you need to have someone help you when you read instructions, pamphlets, or other written material from your doctor or pharmacy? Always (P)                    Ms. Clark's children alternate staying with her and her care takers. She has been receiving services from [facility] but family is wanting to discharge back home with [facility]. Will continue to monitor needs.

## 2024-08-05 NOTE — PROGRESS NOTES
Pharmacist Renal Dose Adjustment Note    Jane Clark is a 78 y.o. female being treated with the medication enoxaparin.     Patient Data:    Vital Signs (Most Recent):  Temp: 98.5 °F (36.9 °C) (08/05/24 0805)  Pulse: 91 (08/05/24 0805)  Resp: 18 (08/05/24 0805)  BP: (!) 172/81 (08/05/24 0805)  SpO2: (!) 94 % (08/05/24 0805) Vital Signs (72h Range):  Temp:  [97.7 °F (36.5 °C)-98.5 °F (36.9 °C)]   Pulse:  []   Resp:  [16-22]   BP: (120-172)/()   SpO2:  [93 %-99 %]      Recent Labs   Lab 08/02/24  0502 08/03/24  0231 08/05/24  0544   CREATININE 0.54* 0.66 0.78     Serum creatinine: 0.78 mg/dL 08/05/24 0544  Estimated creatinine clearance: 48.5 mL/min    Medication: enoxaparin 30mg injection under the skin every 24 hours will be changed to medication: enoxaparin 40mg injection under the skin every 24 hours.     Pharmacist's Name: Concha Khanna, PharmD

## 2024-08-05 NOTE — PT/OT/SLP EVAL
Occupational Therapy   Evaluation    Name: Jane Clark  MRN: [id number]  Admitting Diagnosis: Generalized weakness  Recent Surgery: * No surgery found *      Recommendations:     Discharge Recommendations: Low Intensity Therapy  Discharge Equipment Recommendations:     Barriers to discharge:  None    Assessment:     Jane Clark is a 78 y.o. female with a medical diagnosis of Generalized weakness.  She presents with weakness. Pt has Hx of severe dementia Performance deficits affecting function:  decreased self care skills, strength, endurance,      Rehab Prognosis: Fair; patient would benefit from acute skilled OT services to address these deficits and reach maximum level of function.       Plan:     Patient to be seen 5 x/week to address the above listed problems via self-care/home management, therapeutic activities, therapeutic exercises  Plan of Care Expires:    Plan of Care Reviewed with: patient    Subjective     Chief Complaint: Pt did not voice any complaints  Patient/Family Comments/goals: Family may take Pt home on hospice    Occupational Profile:  Living Environment: Pt lives with son and daughter and a health care worker. All PLOF obtained from health record. Pt was unable to answer any questions and there was no family present at bedside at time of eval  Previous level of function: health care worker assist Pt will all ADL  Roles and Routines: self care  Equipment Used at Home: rollator  Assistance upon Discharge: Pt will have assist form children and HH.    Pain/Comfort:  Pain Rating 1: 0/10    Patients cultural, spiritual, Scientologist conflicts given the current situation:      Objective:     Communicated with: nursing prior to session.  Patient found HOB elevated with   upon OT entry to room.    General Precautions: Standard, fall, pureed diet  Orthopedic Precautions:    Braces: N/A  Respiratory Status: Room air    Occupational Performance:    Bed Mobility:    Patient completed Supine to Sit with  moderate assistance  Patient completed Sit to Supine with moderate assistance    Functional Mobility/Transfers:    Functional Mobility:     Activities of Daily Living:  Feeding:  total assistance Pt was unable to scoop food and complete spoon/mouth excursion    Cognitive/Visual Perceptual:  Cognitive/Psychosocial Skills:     -       Oriented to: Pt not oriented to place, time or situation   -       Follows Commands/attention:Inattentive and Easily distracted    Physical Exam:  Upper Extremity Range of Motion:     -       Right Upper Extremity: WFL  -       Left Upper Extremity: WFL  Upper Extremity Strength:    -       Right Upper Extremity: 2+/5  -       Left Upper Extremity: 2+/5    AMPAC 6 Click ADL:  AMPAC Total Score: 10    Treatment & Education:  Pt educated on role and scope of OT practice      Patient left HOB elevated with call button in reach and CNA present    GOALS:   Multidisciplinary Problems       Occupational Therapy Goals          Problem: Occupational Therapy    Goal Priority Disciplines Outcome Interventions   Occupational Therapy Goal     OT, PT/OT Progressing    Description: STG:  Pt will perform grooming with Mod A  Pt will perform UE dressing with Mod a  Pt will sit EOB x 15 min with CGA assistance  Pt will transfer bed/chair/bsc with Min A  Pt will perform standing task x 5 min with CGA assistance  Pt will tolerate 20 minutes of tx without fatigue      LT.Restore to max I with self care and mobility.                         History:     Past Medical History:   Diagnosis Date    Achrochordon     Aphasia as late effect of cerebrovascular accident     Cervicalgia     Diabetes mellitus, type 2     Hiatal hernia with GERD     Insomnia secondary to depression with anxiety     Mixed hyperlipidemia 2022    Primary hypertension 2022    Senile dementia with behavioral disturbance     Sleep related leg cramps     Stroke     TMJ (dislocation of temporomandibular joint)     Vascular  dementia with behavior disturbance 03/02/2022    Vitamin B 12 deficiency     Vitamin D deficiency          Past Surgical History:   Procedure Laterality Date    BREAST BIOPSY      rt.core biopsy    carotid dopple complete      CHOLECYSTECTOMY      COLONOSCOPY  01/10/2017    repeat in 5 years    ESOPHAGOGASTRODUODENOSCOPY  11/29/2016    HYSTERECTOMY         Time Tracking:     OT Date of Treatment:    OT Start Time: 1145  OT Stop Time: 1200  OT Total Time (min): 15 min    Billable Minutes:Evaluation 15    8/5/2024

## 2024-08-05 NOTE — PROGRESS NOTES
Pharmacist Renal Dose Adjustment Note    Jane Clark is a 78 y.o. female being treated with the medication levofloxacin    Patient Data:    Vital Signs (Most Recent):  Temp: 98.5 °F (36.9 °C) (08/05/24 0805)  Pulse: 91 (08/05/24 0805)  Resp: 18 (08/05/24 0805)  BP: (!) 172/81 (08/05/24 0805)  SpO2: (!) 94 % (08/05/24 0805) Vital Signs (72h Range):  Temp:  [97.7 °F (36.5 °C)-98.5 °F (36.9 °C)]   Pulse:  []   Resp:  [16-22]   BP: (120-172)/()   SpO2:  [93 %-99 %]      Recent Labs   Lab 08/02/24  0502 08/03/24  0231 08/05/24  0544   CREATININE 0.54* 0.66 0.78     Serum creatinine: 0.78 mg/dL 08/05/24 0544  Estimated creatinine clearance: 48.5 mL/min    Medication: levofloxacin 750mg by mouth every 24 hours will be changed to medication: levofloxacin 750mg by mouth every 48 hours.     Pharmacist's Name: Concha Khanna, PharmD

## 2024-08-05 NOTE — PLAN OF CARE
Problem: Fall Injury Risk  Goal: Absence of Fall and Fall-Related Injury  Outcome: Progressing  Intervention: Promote Injury-Free Environment  Flowsheets (Taken 8/5/2024 1124)  Safety Promotion/Fall Prevention:   assistive device/personal item within reach   bed alarm set   chair alarm set   nonskid shoes/socks when out of bed   instructed to call staff for mobility   side rails raised x 3   room near unit station

## 2024-08-05 NOTE — PT/OT/SLP EVAL
Physical Therapy Evaluation    Patient Name:  Jane Clark   MRN:  [id number]    Recommendations:     Discharge Recommendations: Low Intensity Therapy   Discharge Equipment Recommendations: none   Barriers to discharge: Decreased caregiver support    Assessment:     Jane Clark is a 78 y.o. female admitted with a medical diagnosis of Generalized weakness.  She presents with the following impairments/functional limitations: weakness, impaired endurance, impaired sensation, impaired self care skills, gait instability, impaired balance, decreased lower extremity function, decreased safety awareness, decreased ROM. Patient appearing very lethargic and confused today. She needed verbal and tactile cues to stay awake and focused on tasks. She was unable to correctly follow instructions throughout the physical therapy evaluation. She did complete mmt showing weakness bilaterally with some decreased range of motion limited cognitively. She sat edge of bed for about 30 seconds before stating she was feeling sick and wanted to be back in bed. She stated she needed a nurse after getting back into bed. Nurses were then notified.    Rehab Prognosis: Fair; patient would benefit from acute skilled PT services to address these deficits and reach maximum level of function.    Recent Surgery: * No surgery found *      Plan:     During this hospitalization, patient to be seen 5 x/week to address the identified rehab impairments via gait training, therapeutic exercises, therapeutic activities, neuromuscular re-education and progress toward the following goals:    Plan of Care Expires:  08/22/24    Subjective     Chief Complaint: weakness  Patient/Family Comments/goals: patient unable to effectively communicate today.  Pain/Comfort:  Pain Rating 1: 0/10    Patients cultural, spiritual, Taoist conflicts given the current situation: no    Living Environment:  Information obtained from health record since patient was unable to  communicate. Pt lives with her son and daughter as well as a care taker. She stated she didn't know if she had any steps or stairs.  Prior to admission, patients level of function was needing assistance with all ADL from caretaker.  Equipment used at home: rollator.  DME owned (not currently used): none.  Upon discharge, patient will have assistance from care taker.    Objective:     Communicated with nurses prior to session.  Patient found HOB elevated with bed alarm  upon PT entry to room.    General Precautions: Standard, fall  Orthopedic Precautions:N/A   Braces: N/A  Respiratory Status: Room air    Exams:  RLE ROM: Deficits: Patient unable to complete hip and knee flexion testing  RLE Strength: Deficits: hip flexion 2+/5, knee flexion 2+/5, knee ext 3+/5, ankle PF/DF 3+/5  LLE ROM: Deficits: Patient unable to complete hip and knee flexion testing  LLE Strength: Deficits: hip flexion 2+/5, knee flexion 2+/5, knee ext 3+/5, ankle PF/DF 3+/5    Functional Mobility:  Bed Mobility:     Rolling Left:  moderate assistance  Rolling Right: moderate assistance  Scooting: maximal assistance  Bridging: maximal assistance  Supine to Sit: moderate assistance  Sit to Supine: moderate assistance  Transfers:     Sit to Stand:  Not tested with rolling walker  Balance: sitting balance- mod assistance to maintain      AM-PAC 6 CLICK MOBILITY  Total Score:11       Treatment & Education:  Bed mobility and transfer technique  Physical therapy POC  Call button for nurses- patient without good carryover of instruction due to confusion.    Patient left HOB elevated with call button in reach, bed alarm on, and nurses notified.    GOALS:   Multidisciplinary Problems       Physical Therapy Goals          Problem: Physical Therapy    Goal Priority Disciplines Outcome Goal Variances Interventions   Physical Therapy Goal     PT, PT/OT Progressing     Description: Short-term Goals: 1.5 weeks  1. Patient will perform supine to/from sit with  moderate assistance to improve independence and safety with bed mobility.  2. Patient will perform sit to/from stand with a rolling walker with moderate assistance to improve independence and safety with transfers.  3. Patient will ambulate 15 feet with a rolling walker with moderate assistance to improve independence and safety with gait.  4. Patient will tolerate 15 minutes of physical therapy intervention to improve endurance and activity tolerance.    Long-term goals: 3 weeks  1. Patient will perform supine to/from sit with contact guard assist to improve independence and safety with bed mobility.  2. Patient will perform sit to/from stand with a rolling walker with contact guard assist to improve independence and safety with transfers.  3. Patient will ambulate 50 feet with a rolling walker with contact guard assist to improve independence and safety with gait.  4. Patient will tolerate 30 minutes of physical therapy intervention to improve endurance and activity tolerance.                        History:     Past Medical History:   Diagnosis Date    Achrochordon     Aphasia as late effect of cerebrovascular accident     Cervicalgia     Diabetes mellitus, type 2     Hiatal hernia with GERD     Insomnia secondary to depression with anxiety     Mixed hyperlipidemia 03/02/2022    Primary hypertension 03/02/2022    Senile dementia with behavioral disturbance     Sleep related leg cramps     Stroke     TMJ (dislocation of temporomandibular joint)     Vascular dementia with behavior disturbance 03/02/2022    Vitamin B 12 deficiency     Vitamin D deficiency        Past Surgical History:   Procedure Laterality Date    BREAST BIOPSY      rt.core biopsy    carotid dopple complete      CHOLECYSTECTOMY      COLONOSCOPY  01/10/2017    repeat in 5 years    ESOPHAGOGASTRODUODENOSCOPY  11/29/2016    HYSTERECTOMY         Time Tracking:     PT Received On: 08/05/24  PT Start Time: 1030     PT Stop Time: 1053  PT Total Time  (min): 23 min     Billable Minutes: Evaluation 23 08/05/2024

## 2024-08-05 NOTE — PLAN OF CARE
Problem: Occupational Therapy  Goal: Occupational Therapy Goal  Description: STG:  Pt will perform grooming with Mod A  Pt will perform UE dressing with Mod a  Pt will sit EOB x 15 min with CGA assistance  Pt will transfer bed/chair/bsc with Min A  Pt will perform standing task x 5 min with CGA assistance  Pt will tolerate 20 minutes of tx without fatigue      LT.Restore to max I with self care and mobility.    Outcome: Progressing

## 2024-08-05 NOTE — PLAN OF CARE
Problem: Adult Inpatient Plan of Care  Goal: Patient-Specific Goal (Individualized)  Outcome: Progressing  Goal: Absence of Hospital-Acquired Illness or Injury  Outcome: Progressing  Goal: Optimal Comfort and Wellbeing  Outcome: Progressing

## 2024-08-05 NOTE — PLAN OF CARE
Problem: Physical Therapy  Goal: Physical Therapy Goal  Description: Short-term Goals: 1.5 weeks  1. Patient will perform supine to/from sit with moderate assistance to improve independence and safety with bed mobility.  2. Patient will perform sit to/from stand with a rolling walker with moderate assistance to improve independence and safety with transfers.  3. Patient will ambulate 15 feet with a rolling walker with moderate assistance to improve independence and safety with gait.  4. Patient will tolerate 15 minutes of physical therapy intervention to improve endurance and activity tolerance.    Long-term goals: 3 weeks  1. Patient will perform supine to/from sit with contact guard assist to improve independence and safety with bed mobility.  2. Patient will perform sit to/from stand with a rolling walker with contact guard assist to improve independence and safety with transfers.  3. Patient will ambulate 50 feet with a rolling walker with contact guard assist to improve independence and safety with gait.  4. Patient will tolerate 30 minutes of physical therapy intervention to improve endurance and activity tolerance.   Outcome: Progressing

## 2024-08-06 LAB
BACTERIA BLD CULT: NORMAL
BACTERIA BLD CULT: NORMAL
GLUCOSE SERPL-MCNC: 118 MG/DL (ref 70–105)
GLUCOSE SERPL-MCNC: 149 MG/DL (ref 70–105)

## 2024-08-06 PROCEDURE — 97116 GAIT TRAINING THERAPY: CPT | Mod: CQ

## 2024-08-06 PROCEDURE — 27000982 HC MATTRESS, MATRIX LAL RENTAL

## 2024-08-06 PROCEDURE — 25000003 PHARM REV CODE 250: Performed by: FAMILY MEDICINE

## 2024-08-06 PROCEDURE — 97530 THERAPEUTIC ACTIVITIES: CPT

## 2024-08-06 PROCEDURE — 63600175 PHARM REV CODE 636 W HCPCS: Performed by: FAMILY MEDICINE

## 2024-08-06 PROCEDURE — 25000242 PHARM REV CODE 250 ALT 637 W/ HCPCS: Performed by: FAMILY MEDICINE

## 2024-08-06 PROCEDURE — 94640 AIRWAY INHALATION TREATMENT: CPT

## 2024-08-06 PROCEDURE — 27000944

## 2024-08-06 PROCEDURE — 94761 N-INVAS EAR/PLS OXIMETRY MLT: CPT

## 2024-08-06 PROCEDURE — 25000242 PHARM REV CODE 250 ALT 637 W/ HCPCS: Performed by: NURSE PRACTITIONER

## 2024-08-06 PROCEDURE — 25000003 PHARM REV CODE 250: Performed by: NURSE PRACTITIONER

## 2024-08-06 PROCEDURE — 82962 GLUCOSE BLOOD TEST: CPT

## 2024-08-06 PROCEDURE — 99308 SBSQ NF CARE LOW MDM 20: CPT | Mod: ,,, | Performed by: FAMILY MEDICINE

## 2024-08-06 PROCEDURE — 11000004 HC SNF PRIVATE

## 2024-08-06 RX ADMIN — Medication 1 CAPSULE: at 04:08

## 2024-08-06 RX ADMIN — MEMANTINE 10 MG: 5 TABLET ORAL at 09:08

## 2024-08-06 RX ADMIN — ATORVASTATIN CALCIUM 40 MG: 40 TABLET, FILM COATED ORAL at 09:08

## 2024-08-06 RX ADMIN — CITALOPRAM HYDROBROMIDE 40 MG: 20 TABLET ORAL at 09:08

## 2024-08-06 RX ADMIN — Medication 1 CAPSULE: at 11:08

## 2024-08-06 RX ADMIN — IPRATROPIUM BROMIDE AND ALBUTEROL SULFATE 3 ML: 2.5; .5 SOLUTION RESPIRATORY (INHALATION) at 07:08

## 2024-08-06 RX ADMIN — ACETYLCYSTEINE 4 ML: 200 INHALANT RESPIRATORY (INHALATION) at 07:08

## 2024-08-06 RX ADMIN — MEGESTROL ACETATE 40 MG: 40 TABLET ORAL at 09:08

## 2024-08-06 RX ADMIN — TRAMADOL HYDROCHLORIDE 50 MG: 50 TABLET, COATED ORAL at 10:08

## 2024-08-06 RX ADMIN — DONEPEZIL HYDROCHLORIDE 10 MG: 5 TABLET ORAL at 09:08

## 2024-08-06 RX ADMIN — ENOXAPARIN SODIUM 40 MG: 40 INJECTION SUBCUTANEOUS at 04:08

## 2024-08-06 RX ADMIN — PANTOPRAZOLE SODIUM 40 MG: 40 TABLET, DELAYED RELEASE ORAL at 09:08

## 2024-08-06 RX ADMIN — TRAZODONE HYDROCHLORIDE 150 MG: 50 TABLET ORAL at 09:08

## 2024-08-06 RX ADMIN — BUSPIRONE HYDROCHLORIDE 10 MG: 5 TABLET ORAL at 11:08

## 2024-08-06 RX ADMIN — Medication 1 CAPSULE: at 09:08

## 2024-08-06 RX ADMIN — FLUTICASONE PROPIONATE 50 MCG: 50 SPRAY, METERED NASAL at 09:08

## 2024-08-06 NOTE — SUBJECTIVE & OBJECTIVE
Interval History: weakness    Review of Systems   Unable to perform ROS: Dementia     Objective:     Vital Signs (Most Recent):  Temp: 98.3 °F (36.8 °C) (08/06/24 0754)  Pulse: 93 (08/06/24 0754)  Resp: 20 (08/06/24 0754)  BP: 125/84 (08/06/24 0754)  SpO2: (!) 94 % (08/06/24 0754) Vital Signs (24h Range):  Temp:  [98.3 °F (36.8 °C)-98.4 °F (36.9 °C)] 98.3 °F (36.8 °C)  Pulse:  [] 93  Resp:  [16-20] 20  SpO2:  [94 %-100 %] 94 %  BP: (119-125)/(71-84) 125/84     Weight: 50.9 kg (112 lb 3.2 oz)  Body mass index is 18.67 kg/m².    Intake/Output Summary (Last 24 hours) at 8/6/2024 1525  Last data filed at 8/6/2024 1238  Gross per 24 hour   Intake 980 ml   Output --   Net 980 ml         Physical Exam  HENT:      Head: Normocephalic.      Mouth/Throat:      Mouth: Mucous membranes are moist.   Cardiovascular:      Rate and Rhythm: Normal rate and regular rhythm.      Pulses: Normal pulses.      Heart sounds: Normal heart sounds.   Pulmonary:      Breath sounds: Normal breath sounds.   Abdominal:      General: Bowel sounds are normal. There is no distension.      Palpations: Abdomen is soft. There is no mass.      Tenderness: There is no abdominal tenderness.      Hernia: No hernia is present.   Musculoskeletal:         General: Normal range of motion.      Cervical back: Normal range of motion.   Skin:     General: Skin is warm and dry.   Neurological:      Mental Status: She is disoriented.             Significant Labs: All pertinent labs within the past 24 hours have been reviewed.  Recent Lab Results         08/06/24  1058        POC Glucose 149               Significant Imaging: I have reviewed all pertinent imaging results/findings within the past 24 hours.

## 2024-08-06 NOTE — PROGRESS NOTES
[facility] - Medical Surgical Unit  Hospital Medicine  Progress Note    Patient Name: Jane Clark  MRN: [id number]  Patient Class: IP- Swing   Admission Date: 8/3/2024  Length of Stay: 3 days  Attending Physician: Chapito Plaza IV, DO  Primary Care Provider: Didi Singer NP        Subjective:     Principal Problem:Generalized weakness        HPI:  78 year old pleasantly demented  female presented to our facility today from [facility] for admission into swing bed for generalized weakness and treatment for UTI.  Pt has known hx of dementia, DM, HLD, HTN and CVA.  She went in to the ER at [facility] on 7/27/24 and was diagnosed with UTI, placed on Cefdinir.  She went to PCP clinic on 7/31/24 for decreased PO intake, refusal to take PO antibiotic and increased confusion.  They did lab which showed WBC 13 and BUN 27 for abnormals.  They contacted Dr Freire and had patient direct admitted to hospital services.  While admitted, patient received hydration and was started on Levaquin for UTI.  Due to her weakness, the decision was made to transfer her to our facility to complete her antibiotic therapy and work with PT/OT therapies for help improved strength/coordination.  Patient's daughter was here with her upon arrival, and she has chosen for patient to be DNR.  Pt remains pleasantly confused and is at baseline per family.      Overview/Hospital Course:  08/06/24 Awake and resting in bed. Denies SOB and pain. No distress noted. CNA reports Mrs. Man eating 50% of meals served. Walked 17 feet with therapist today. Continue therapy for strengthening. Continue levaquin.    Interval History: weakness    Review of Systems   Unable to perform ROS: Dementia     Objective:     Vital Signs (Most Recent):  Temp: 98.3 °F (36.8 °C) (08/06/24 0754)  Pulse: 93 (08/06/24 0754)  Resp: 20 (08/06/24 0754)  BP: 125/84 (08/06/24 0754)  SpO2: (!) 94 % (08/06/24 0754) Vital Signs (24h Range):  Temp:  [98.3  °F (36.8 °C)-98.4 °F (36.9 °C)] 98.3 °F (36.8 °C)  Pulse:  [] 93  Resp:  [16-20] 20  SpO2:  [94 %-100 %] 94 %  BP: (119-125)/(71-84) 125/84     Weight: 50.9 kg (112 lb 3.2 oz)  Body mass index is 18.67 kg/m².    Intake/Output Summary (Last 24 hours) at 8/6/2024 1525  Last data filed at 8/6/2024 1238  Gross per 24 hour   Intake 980 ml   Output --   Net 980 ml         Physical Exam  HENT:      Head: Normocephalic.      Mouth/Throat:      Mouth: Mucous membranes are moist.   Cardiovascular:      Rate and Rhythm: Normal rate and regular rhythm.      Pulses: Normal pulses.      Heart sounds: Normal heart sounds.   Pulmonary:      Breath sounds: Normal breath sounds.   Abdominal:      General: Bowel sounds are normal. There is no distension.      Palpations: Abdomen is soft. There is no mass.      Tenderness: There is no abdominal tenderness.      Hernia: No hernia is present.   Musculoskeletal:         General: Normal range of motion.      Cervical back: Normal range of motion.   Skin:     General: Skin is warm and dry.   Neurological:      Mental Status: She is disoriented.             Significant Labs: All pertinent labs within the past 24 hours have been reviewed.  Recent Lab Results         08/06/24  1058        POC Glucose 149               Significant Imaging: I have reviewed all pertinent imaging results/findings within the past 24 hours.    Assessment/Plan:      * Generalized weakness    PT/OT assessment    08/06/24 walked 17 feet with therapist    UTI (urinary tract infection)    Continue her Levaquin for 5 days    Dementia with behavioral disturbance  Dementia is moderate. The patient does not have signs of behavioral disturbance. Home dementia medications are Held or Continued: continued.. Continue non-pharmacologic interventions to prevent delirium (No VS between 11PM-5AM, activity during day, opening blinds, providing glasses/hearing aids, and up in chair during daytime). Will avoid narcotics and  benzos unless absolutely necessary.     Primary hypertension  Chronic, stable. Latest blood pressure and vitals reviewed-     Temp:  [97.7 °F (36.5 °C)-98.2 °F (36.8 °C)]   Pulse:  []   Resp:  [17-22]   BP: (120-148)/()   SpO2:  [94 %-98 %] .   Home meds for hypertension were reviewed and noted below.       While in the hospital, will manage blood pressure as follows; Continue home antihypertensive regimen      Mixed hyperlipidemia  Continue atorvastatin        VTE Risk Mitigation (From admission, onward)           Ordered     enoxaparin injection 40 mg  Every 24 hours         08/05/24 1104     IP VTE HIGH RISK PATIENT  Once         08/03/24 1320     Place sequential compression device  Until discontinued         08/03/24 1320                    Discharge Planning   JUDE: 8/22/2024     Code Status: DNR   Is the patient medically ready for discharge?:     Reason for patient still in hospital (select all that apply): Treatment  Discharge Plan A: Home with family, Hospice/home                  Chapito Plaza IV, DO  Department of Hospital Medicine   [facility]

## 2024-08-06 NOTE — PT/OT/SLP PROGRESS
Physical Therapy Treatment    Patient Name:  Jane Clark   MRN:  [id number]    Recommendations:     Discharge Recommendations: Low Intensity Therapy  Discharge Equipment Recommendations: none  Barriers to discharge: None    Assessment:     Jane Clark is a 78 y.o. female admitted with a medical diagnosis of Generalized weakness.  She presents with the following impairments/functional limitations: weakness, impaired endurance, impaired self care skills, impaired functional mobility, gait instability, impaired balance, impaired cognition, decreased lower extremity function, decreased safety awareness Patient requires continuous verbal and tactile cues to complete therapy session today due to her dementia.    Rehab Prognosis: Fair; patient would benefit from acute skilled PT services to address these deficits and reach maximum level of function.    Recent Surgery: * No surgery found *      Plan:     During this hospitalization, patient to be seen 5 x/week to address the identified rehab impairments via gait training, therapeutic activities, therapeutic exercises, neuromuscular re-education and progress toward the following goals:    Plan of Care Expires:  08/22/24    Subjective     Chief Complaint: patient voices no complaints   Patient/Family Comments/goals: return home   Pain/Comfort:  Pain Rating 1: 0/10      Objective:     Communicated with OT prior to session.  Patient found HOB elevated with bed alarm, chair check upon PT entry to room.     General Precautions: Standard, fall  Orthopedic Precautions: N/A  Braces: N/A  Respiratory Status: Room air     Functional Mobility:  Bed Mobility:     Supine to Sit: moderate assistance  Transfers:     Sit to Stand:  moderate assistance with rolling walker  Bed to Chair: minimum assistance and moderate assistance with  rolling walker  using  Step Transfer  Gait: Patient ambulated 17' with RW and CGA - Min A. Patient requires constant cueing in order to ensure proper  usage of walker as well as to stay on task with ambulation.      AM-PAC 6 CLICK MOBILITY  Turning over in bed (including adjusting bedclothes, sheets and blankets)?: 2  Sitting down on and standing up from a chair with arms (e.g., wheelchair, bedside commode, etc.): 2  Moving from lying on back to sitting on the side of the bed?: 2  Moving to and from a bed to a chair (including a wheelchair)?: 2  Need to walk in hospital room?: 2  Climbing 3-5 steps with a railing?: 2  Basic Mobility Total Score: 12       Treatment & Education:  Transfers and ambulation as listed above.   Long arc quads - x 10 reps AAROM with continuous verbal and tactile cues to complete  Patient was unable to follow commands of any other exercises.     Patient left up in chair with call button in reach, chair alarm on, and CNA notified..    GOALS:   Multidisciplinary Problems       Physical Therapy Goals          Problem: Physical Therapy    Goal Priority Disciplines Outcome Goal Variances Interventions   Physical Therapy Goal     PT, PT/OT Progressing     Description: Short-term Goals: 1.5 weeks  1. Patient will perform supine to/from sit with moderate assistance to improve independence and safety with bed mobility.  2. Patient will perform sit to/from stand with a rolling walker with moderate assistance to improve independence and safety with transfers.  3. Patient will ambulate 15 feet with a rolling walker with moderate assistance to improve independence and safety with gait.  4. Patient will tolerate 15 minutes of physical therapy intervention to improve endurance and activity tolerance.    Long-term goals: 3 weeks  1. Patient will perform supine to/from sit with contact guard assist to improve independence and safety with bed mobility.  2. Patient will perform sit to/from stand with a rolling walker with contact guard assist to improve independence and safety with transfers.  3. Patient will ambulate 50 feet with a rolling walker with  contact guard assist to improve independence and safety with gait.  4. Patient will tolerate 30 minutes of physical therapy intervention to improve endurance and activity tolerance.                        Time Tracking:     PT Received On: 08/06/24  PT Start Time: 0940     PT Stop Time: 0955  PT Total Time (min): 15 min     Billable Minutes: Gait Training 15    Treatment Type: Treatment  PT/PTA: PTA     Number of PTA visits since last PT visit: 1   CARLOZ Silvestre  08/06/2024

## 2024-08-06 NOTE — HOSPITAL COURSE
08/06/24 Awake and resting in bed. Denies SOB and pain. No distress noted. CNA reports Mrs. Man eating 50% of meals served. Walked 17 feet with therapist today. Continue therapy for strengthening. Continue levaquin.  08/09/24 Resting in bed. Awakens when called. Denies pain. Night shift reports her having fall last night. Had imaging of left hip and pelvis- shows mild osteoarthritis. Continue PT and OT for strengthening.  08/12/24 Awake and resting in bed. Continues to have confusion, tries to get out of bed. Requires frequent redirection. Appetite is fair. Continue PT and OT for strengthening.   08/13/24 Mrs. Man has been accepted to [facility]. She will discharge today. Continue PT and OT. Fall precautions. Meds reconciled. Follow up appts made.

## 2024-08-06 NOTE — PT/OT/SLP PROGRESS
Occupational Therapy   Treatment    Name: Jane Clark  MRN: [id number]  Admitting Diagnosis:  Generalized weakness       Recommendations:     Discharge Recommendations: Low Intensity Therapy  Discharge Equipment Recommendations:     Barriers to discharge:  None    Assessment:     Jane Clark is a 78 y.o. female with a medical diagnosis of Generalized weakness.  She presents with weakness. Performance deficits affecting function are  .     Rehab Prognosis:  Fair; patient would benefit from acute skilled OT services to address these deficits and reach maximum level of function.       Plan:     Patient to be seen 5 x/week to address the above listed problems via self-care/home management, therapeutic activities, therapeutic exercises  Plan of Care Expires:    Plan of Care Reviewed with: patient    Subjective     Chief Complaint: Pt voiced no complaints  Patient/Family Comments/goals: return to PLOF  Pain/Comfort:       Objective:     Communicated with: Pt prior to session.  Patient found HOB elevated with   upon OT entry to room.    General Precautions: Standard, fall, pureed diet    Orthopedic Precautions:   Braces: N/A  Respiratory Status: Room air     Occupational Performance:     Bed Mobility:    Patient completed Supine to Sit with supervision     Functional Mobility/Transfers:  Patient completed Sit <> Stand Transfer with minimum assistance  with  rolling walker   Patient completed Bed <> Chair Transfer using Step Transfer technique with minimum assistance with rolling walker  Functional Mobility: Pt ambulated bed to hallway using RW with Mod Vcs for completion.    Activities of Daily Living:  Grooming: modified independence Pt washed face with washcloth with Vcs for completion. Pt is able to follow 1-step simple commands      [facility] 6 Click ADL:      Treatment & Education:  See above    Patient left up in chair with call button in reach, chair alarm on, and nursing notified    GOALS:   Multidisciplinary  Problems       Occupational Therapy Goals          Problem: Occupational Therapy    Goal Priority Disciplines Outcome Interventions   Occupational Therapy Goal     OT, PT/OT Progressing    Description: STG:  Pt will perform grooming with Mod A  Pt will perform UE dressing with Mod a  Pt will sit EOB x 15 min with CGA assistance  Pt will transfer bed/chair/bsc with Min A  Pt will perform standing task x 5 min with CGA assistance  Pt will tolerate 20 minutes of tx without fatigue      LT.Restore to max I with self care and mobility.                         Time Tracking:     OT Date of Treatment: 24  OT Start Time: 940  OT Stop Time: 955  OT Total Time (min): 15 min    Billable Minutes:Therapeutic Activity 15               2024

## 2024-08-06 NOTE — PLAN OF CARE
Problem: Fall Injury Risk  Goal: Absence of Fall and Fall-Related Injury  Outcome: Progressing  Intervention: Promote Injury-Free Environment  Flowsheets (Taken 8/6/2024 1136)  Safety Promotion/Fall Prevention:   assistive device/personal item within reach   bed alarm set   chair alarm set   instructed to call staff for mobility   nonskid shoes/socks when out of bed   side rails raised x 3   room near unit station

## 2024-08-07 ENCOUNTER — DOCUMENT SCAN (OUTPATIENT)
Dept: HOME HEALTH SERVICES | Facility: HOSPITAL | Age: 79
End: 2024-08-07
Payer: MEDICARE

## 2024-08-07 LAB
GLUCOSE SERPL-MCNC: 138 MG/DL (ref 70–105)
GLUCOSE SERPL-MCNC: 138 MG/DL (ref 70–105)
GLUCOSE SERPL-MCNC: 169 MG/DL (ref 70–105)
GLUCOSE SERPL-MCNC: 169 MG/DL (ref 70–105)

## 2024-08-07 PROCEDURE — 99900035 HC TECH TIME PER 15 MIN (STAT)

## 2024-08-07 PROCEDURE — 25000003 PHARM REV CODE 250: Performed by: FAMILY MEDICINE

## 2024-08-07 PROCEDURE — 94640 AIRWAY INHALATION TREATMENT: CPT

## 2024-08-07 PROCEDURE — 11000004 HC SNF PRIVATE

## 2024-08-07 PROCEDURE — 97530 THERAPEUTIC ACTIVITIES: CPT

## 2024-08-07 PROCEDURE — 27000982 HC MATTRESS, MATRIX LAL RENTAL

## 2024-08-07 PROCEDURE — 94761 N-INVAS EAR/PLS OXIMETRY MLT: CPT

## 2024-08-07 PROCEDURE — 63600175 PHARM REV CODE 636 W HCPCS: Performed by: FAMILY MEDICINE

## 2024-08-07 PROCEDURE — 97530 THERAPEUTIC ACTIVITIES: CPT | Mod: CQ

## 2024-08-07 PROCEDURE — 82962 GLUCOSE BLOOD TEST: CPT

## 2024-08-07 PROCEDURE — 25000242 PHARM REV CODE 250 ALT 637 W/ HCPCS: Performed by: NURSE PRACTITIONER

## 2024-08-07 PROCEDURE — 25000242 PHARM REV CODE 250 ALT 637 W/ HCPCS: Performed by: FAMILY MEDICINE

## 2024-08-07 PROCEDURE — 27000944

## 2024-08-07 PROCEDURE — 25000003 PHARM REV CODE 250: Performed by: NURSE PRACTITIONER

## 2024-08-07 RX ORDER — IPRATROPIUM BROMIDE AND ALBUTEROL SULFATE 2.5; .5 MG/3ML; MG/3ML
3 SOLUTION RESPIRATORY (INHALATION) EVERY 12 HOURS
Status: DISCONTINUED | OUTPATIENT
Start: 2024-08-07 | End: 2024-08-13 | Stop reason: HOSPADM

## 2024-08-07 RX ADMIN — ENOXAPARIN SODIUM 40 MG: 40 INJECTION SUBCUTANEOUS at 04:08

## 2024-08-07 RX ADMIN — MEGESTROL ACETATE 40 MG: 40 TABLET ORAL at 08:08

## 2024-08-07 RX ADMIN — DONEPEZIL HYDROCHLORIDE 10 MG: 5 TABLET ORAL at 09:08

## 2024-08-07 RX ADMIN — MEMANTINE 10 MG: 5 TABLET ORAL at 09:08

## 2024-08-07 RX ADMIN — MEMANTINE 10 MG: 5 TABLET ORAL at 08:08

## 2024-08-07 RX ADMIN — BUSPIRONE HYDROCHLORIDE 10 MG: 5 TABLET ORAL at 12:08

## 2024-08-07 RX ADMIN — TRAZODONE HYDROCHLORIDE 150 MG: 50 TABLET ORAL at 09:08

## 2024-08-07 RX ADMIN — PANTOPRAZOLE SODIUM 40 MG: 40 TABLET, DELAYED RELEASE ORAL at 08:08

## 2024-08-07 RX ADMIN — ACETYLCYSTEINE 4 ML: 200 INHALANT RESPIRATORY (INHALATION) at 07:08

## 2024-08-07 RX ADMIN — Medication 1 CAPSULE: at 04:08

## 2024-08-07 RX ADMIN — FLUTICASONE PROPIONATE 50 MCG: 50 SPRAY, METERED NASAL at 08:08

## 2024-08-07 RX ADMIN — LEVOFLOXACIN 750 MG: 500 TABLET, FILM COATED ORAL at 08:08

## 2024-08-07 RX ADMIN — Medication 1 CAPSULE: at 07:08

## 2024-08-07 RX ADMIN — IPRATROPIUM BROMIDE AND ALBUTEROL SULFATE 3 ML: 2.5; .5 SOLUTION RESPIRATORY (INHALATION) at 07:08

## 2024-08-07 RX ADMIN — ATORVASTATIN CALCIUM 40 MG: 40 TABLET, FILM COATED ORAL at 08:08

## 2024-08-07 RX ADMIN — Medication 1 CAPSULE: at 11:08

## 2024-08-07 RX ADMIN — CITALOPRAM HYDROBROMIDE 40 MG: 20 TABLET ORAL at 08:08

## 2024-08-07 NOTE — PT/OT/SLP PROGRESS
Physical Therapy Treatment    Patient Name:  Jane Clark   MRN:  [id number]    Recommendations:     Discharge Recommendations: Low Intensity Therapy  Discharge Equipment Recommendations: none  Barriers to discharge: None    Assessment:     Jane Clark is a 78 y.o. female admitted with a medical diagnosis of Generalized weakness.  She presents with the following impairments/functional limitations: weakness, impaired self care skills, impaired functional mobility, gait instability, impaired balance, impaired cognition, decreased lower extremity function, decreased safety awareness Patient's therapy session was attempted at 0943, but patient was unable to be awoken. Therapist attempted back at 1126 with patient still asleep, but easier to be awoken. Patient was only able to participate in transfers today due to being unable to follow commands in exercises. Patient did present to be fatigued today.     Rehab Prognosis: Fair; patient would benefit from acute skilled PT services to address these deficits and reach maximum level of function.    Recent Surgery: * No surgery found *      Plan:     During this hospitalization, patient to be seen 5 x/week to address the identified rehab impairments via gait training, therapeutic activities, therapeutic exercises, neuromuscular re-education and progress toward the following goals:    Plan of Care Expires:  08/22/24    Subjective     Chief Complaint: patient voices no complaints   Patient/Family Comments/goals: return home   Pain/Comfort:  Pain Rating 1: 0/10      Objective:     Communicated with OT prior to session.  Patient found HOB elevated with bed alarm, chair check upon PT entry to room.     General Precautions: Standard, fall  Orthopedic Precautions: N/A  Braces: N/A  Respiratory Status: Room air     Functional Mobility:  Bed Mobility:     Supine to Sit: moderate assistance  Transfers:     Sit to Stand:  minimum assistance with rolling walker  Bed to Chair: minimum  assistance with  rolling walker  using  Step Transfer  Gait: Patient ambulated 10' with RW and CGA - Min A. Patient requires constant cueing in order to ensure proper usage of walker as well as to stay on task with ambulation.      AM-PAC 6 CLICK MOBILITY  Turning over in bed (including adjusting bedclothes, sheets and blankets)?: 2  Sitting down on and standing up from a chair with arms (e.g., wheelchair, bedside commode, etc.): 2  Moving from lying on back to sitting on the side of the bed?: 2  Moving to and from a bed to a chair (including a wheelchair)?: 2  Need to walk in hospital room?: 2  Climbing 3-5 steps with a railing?: 2  Basic Mobility Total Score: 12       Treatment & Education:  Transfers and ambulation as listed above.   Patient was unable to follow commands today to participate in further ambulation or exercises.     Patient left up in chair with call button in reach, chair alarm on, and nurse present..    GOALS:   Multidisciplinary Problems       Physical Therapy Goals          Problem: Physical Therapy    Goal Priority Disciplines Outcome Goal Variances Interventions   Physical Therapy Goal     PT, PT/OT Progressing     Description: Short-term Goals: 1.5 weeks  1. Patient will perform supine to/from sit with moderate assistance to improve independence and safety with bed mobility.  2. Patient will perform sit to/from stand with a rolling walker with moderate assistance to improve independence and safety with transfers.  3. Patient will ambulate 15 feet with a rolling walker with moderate assistance to improve independence and safety with gait.  4. Patient will tolerate 15 minutes of physical therapy intervention to improve endurance and activity tolerance.    Long-term goals: 3 weeks  1. Patient will perform supine to/from sit with contact guard assist to improve independence and safety with bed mobility.  2. Patient will perform sit to/from stand with a rolling walker with contact guard assist to  improve independence and safety with transfers.  3. Patient will ambulate 50 feet with a rolling walker with contact guard assist to improve independence and safety with gait.  4. Patient will tolerate 30 minutes of physical therapy intervention to improve endurance and activity tolerance.                        Time Tracking:     PT Received On: 08/07/24  PT Start Time: 1126     PT Stop Time: 1138  PT Total Time (min): 12 min     Billable Minutes: Therapeutic Activity 12    Treatment Type: Treatment  PT/PTA: PTA     Number of PTA visits since last PT visit: 2   CARLOZ Silvestre  08/07/2024

## 2024-08-07 NOTE — NURSING
2055 upon entering patient room, found patient sitting crossed legged in bathroom shower. Patient denied hitting head or any other parts of her body at the time. No obvious injury or distress noted other than confusion. CNA and I assisted patient back to bed. Patient remained calm and accepting. Patient removed yellow chair alarm by removing left arm out of gown and unclipping the alarm, bed alarm on bed blinking no sound but sound option was not turned down on bed. The bed alarm reset without any intervention of myself or CNA when patient was placed back in bed.   Notified NP.  Vital signs obtained, medication given, patient appears stable.     Called and spoke with patient's son, Mr. Michael Clark. Explained situation and how I found patient in bathroom. I explained patient did not appear to be injured at this time. Mr. Clark thanked for calling and letting him know.       Despite redirection at different times through shift, patient wanted to get up out of bed. Patient stated she wanted to get up and did not want to be alone. A couple of times after failed redirection attempts placed patient in recliner chair and in nursing station. Patient appeared to enjoy the company and no distress was noted. Patient C/O of pain but could not be specific of location,  medication was offered and given. Patient pain resolved. Patient able to ambulate using walker with assistance X1 from recliner chair to bed without difficulty or complaint.

## 2024-08-07 NOTE — PT/OT/SLP PROGRESS
Occupational Therapy   Treatment    Name: Jane Clark  MRN: [id number]  Admitting Diagnosis:  Generalized weakness       Recommendations:     Discharge Recommendations: Low Intensity Therapy  Discharge Equipment Recommendations:     Barriers to discharge:  None    Assessment:     Jane Clark is a 78 y.o. female with a medical diagnosis of Generalized weakness.  She presents with weakness and confusion. Performance deficits affecting function are  .     Rehab Prognosis:  Fair; patient would benefit from acute skilled OT services to address these deficits and reach maximum level of function.       Plan:     Patient to be seen 5 x/week to address the above listed problems via self-care/home management, therapeutic activities, therapeutic exercises  Plan of Care Expires:    Plan of Care Reviewed with: patient    Subjective     Chief Complaint: Pt has no complaints  Patient/Family Comments/goals: unknown at this time  Pain/Comfort:       Objective:     Communicated with: Pt prior to session.  Patient found HOB elevated with   upon OT entry to room.    General Precautions: Standard, fall, pureed diet    Orthopedic Precautions:   Braces: N/A  Respiratory Status: Room air     Occupational Performance:     Bed Mobility:    Patient completed Supine to Sit with minimum assistance     Functional Mobility/Transfers:  Patient completed Sit <> Stand Transfer with minimum assistance  with  hand-held assist   Patient completed Bed <> Chair Transfer using Step Transfer technique with minimum assistance with hand-held assist  Functional Mobility: Pt completed step tramsnfer to chair with Min VCs    Activities of Daily Living:  Grooming: modified independence Pt wash face with washcloth with [place] for initiation      [facility] 6 Click ADL:      Treatment & Education:  Pt was difficult to arouse and to initiate treatment. Once Pt started moving she became more alert and was cooperative with transferring to chair    Patient left up in  chair with call button in reach, chair alarm on, and nursing present    GOALS:   Multidisciplinary Problems       Occupational Therapy Goals          Problem: Occupational Therapy    Goal Priority Disciplines Outcome Interventions   Occupational Therapy Goal     OT, PT/OT Progressing    Description: STG:  Pt will perform grooming with Mod A  Pt will perform UE dressing with Mod a  Pt will sit EOB x 15 min with CGA assistance  Pt will transfer bed/chair/bsc with Min A  Pt will perform standing task x 5 min with CGA assistance  Pt will tolerate 20 minutes of tx without fatigue      LT.Restore to max I with self care and mobility.                         Time Tracking:     OT Date of Treatment: 24  OT Start Time: 1126  OT Stop Time: 1138  OT Total Time (min): 12 min    Billable Minutes:Therapeutic Activity 2024

## 2024-08-08 LAB
ANION GAP SERPL CALCULATED.3IONS-SCNC: 10 MMOL/L (ref 7–16)
BASOPHILS # BLD AUTO: 0.06 K/UL (ref 0–0.2)
BASOPHILS NFR BLD AUTO: 0.6 % (ref 0–1)
BUN SERPL-MCNC: 17 MG/DL (ref 7–18)
BUN/CREAT SERPL: 18 (ref 6–20)
CALCIUM SERPL-MCNC: 9 MG/DL (ref 8.5–10.1)
CHLORIDE SERPL-SCNC: 104 MMOL/L (ref 98–107)
CO2 SERPL-SCNC: 33 MMOL/L (ref 21–32)
CREAT SERPL-MCNC: 0.97 MG/DL (ref 0.55–1.02)
DIFFERENTIAL METHOD BLD: ABNORMAL
EGFR (NO RACE VARIABLE) (RUSH/TITUS): 60 ML/MIN/1.73M2
EOSINOPHIL # BLD AUTO: 0.09 K/UL (ref 0–0.5)
EOSINOPHIL NFR BLD AUTO: 0.8 % (ref 1–4)
ERYTHROCYTE [DISTWIDTH] IN BLOOD BY AUTOMATED COUNT: 12.6 % (ref 11.5–14.5)
GLUCOSE SERPL-MCNC: 114 MG/DL (ref 74–106)
GLUCOSE SERPL-MCNC: 119 MG/DL (ref 70–105)
GLUCOSE SERPL-MCNC: 142 MG/DL (ref 70–105)
GLUCOSE SERPL-MCNC: 183 MG/DL (ref 70–105)
GLUCOSE SERPL-MCNC: 211 MG/DL (ref 70–105)
HCT VFR BLD AUTO: 36.1 % (ref 38–47)
HGB BLD-MCNC: 11.8 G/DL (ref 12–16)
IMM GRANULOCYTES # BLD AUTO: 0.06 K/UL (ref 0–0.04)
IMM GRANULOCYTES NFR BLD: 0.6 % (ref 0–0.4)
LYMPHOCYTES # BLD AUTO: 2.83 K/UL (ref 1–4.8)
LYMPHOCYTES NFR BLD AUTO: 26 % (ref 27–41)
MAGNESIUM SERPL-MCNC: 2.3 MG/DL (ref 1.7–2.3)
MCH RBC QN AUTO: 28.5 PG (ref 27–31)
MCHC RBC AUTO-ENTMCNC: 32.7 G/DL (ref 32–36)
MCV RBC AUTO: 87.2 FL (ref 80–96)
MONOCYTES # BLD AUTO: 0.86 K/UL (ref 0–0.8)
MONOCYTES NFR BLD AUTO: 7.9 % (ref 2–6)
MPC BLD CALC-MCNC: 9 FL (ref 9.4–12.4)
NEUTROPHILS # BLD AUTO: 6.98 K/UL (ref 1.8–7.7)
NEUTROPHILS NFR BLD AUTO: 64.1 % (ref 53–65)
NRBC # BLD AUTO: 0 X10E3/UL
NRBC, AUTO (.00): 0 %
PHOSPHATE SERPL-MCNC: 3.6 MG/DL (ref 2.5–4.5)
PLATELET # BLD AUTO: 301 K/UL (ref 150–400)
POTASSIUM SERPL-SCNC: 3.6 MMOL/L (ref 3.5–5.1)
RBC # BLD AUTO: 4.14 M/UL (ref 4.2–5.4)
SODIUM SERPL-SCNC: 143 MMOL/L (ref 136–145)
WBC # BLD AUTO: 10.88 K/UL (ref 4.5–11)

## 2024-08-08 PROCEDURE — 63600175 PHARM REV CODE 636 W HCPCS: Performed by: FAMILY MEDICINE

## 2024-08-08 PROCEDURE — 94761 N-INVAS EAR/PLS OXIMETRY MLT: CPT

## 2024-08-08 PROCEDURE — 27000982 HC MATTRESS, MATRIX LAL RENTAL

## 2024-08-08 PROCEDURE — 25000242 PHARM REV CODE 250 ALT 637 W/ HCPCS: Performed by: NURSE PRACTITIONER

## 2024-08-08 PROCEDURE — 94640 AIRWAY INHALATION TREATMENT: CPT

## 2024-08-08 PROCEDURE — 97110 THERAPEUTIC EXERCISES: CPT

## 2024-08-08 PROCEDURE — 25000003 PHARM REV CODE 250: Performed by: FAMILY MEDICINE

## 2024-08-08 PROCEDURE — 84100 ASSAY OF PHOSPHORUS: CPT | Performed by: NURSE PRACTITIONER

## 2024-08-08 PROCEDURE — 97530 THERAPEUTIC ACTIVITIES: CPT

## 2024-08-08 PROCEDURE — 80048 BASIC METABOLIC PNL TOTAL CA: CPT | Performed by: NURSE PRACTITIONER

## 2024-08-08 PROCEDURE — 27000944

## 2024-08-08 PROCEDURE — 25000003 PHARM REV CODE 250: Performed by: NURSE PRACTITIONER

## 2024-08-08 PROCEDURE — 85025 COMPLETE CBC W/AUTO DIFF WBC: CPT | Performed by: NURSE PRACTITIONER

## 2024-08-08 PROCEDURE — 11000004 HC SNF PRIVATE

## 2024-08-08 PROCEDURE — 36415 COLL VENOUS BLD VENIPUNCTURE: CPT | Performed by: NURSE PRACTITIONER

## 2024-08-08 PROCEDURE — 99900035 HC TECH TIME PER 15 MIN (STAT)

## 2024-08-08 PROCEDURE — 83735 ASSAY OF MAGNESIUM: CPT | Performed by: NURSE PRACTITIONER

## 2024-08-08 PROCEDURE — 82962 GLUCOSE BLOOD TEST: CPT

## 2024-08-08 RX ADMIN — ACETAMINOPHEN 650 MG: 325 TABLET ORAL at 11:08

## 2024-08-08 RX ADMIN — DONEPEZIL HYDROCHLORIDE 10 MG: 5 TABLET ORAL at 08:08

## 2024-08-08 RX ADMIN — ENOXAPARIN SODIUM 40 MG: 40 INJECTION SUBCUTANEOUS at 04:08

## 2024-08-08 RX ADMIN — MEGESTROL ACETATE 40 MG: 40 TABLET ORAL at 08:08

## 2024-08-08 RX ADMIN — Medication 1 CAPSULE: at 04:08

## 2024-08-08 RX ADMIN — IPRATROPIUM BROMIDE AND ALBUTEROL SULFATE 3 ML: 2.5; .5 SOLUTION RESPIRATORY (INHALATION) at 07:08

## 2024-08-08 RX ADMIN — TRAMADOL HYDROCHLORIDE 50 MG: 50 TABLET, COATED ORAL at 03:08

## 2024-08-08 RX ADMIN — TRAZODONE HYDROCHLORIDE 150 MG: 50 TABLET ORAL at 08:08

## 2024-08-08 RX ADMIN — BUSPIRONE HYDROCHLORIDE 10 MG: 5 TABLET ORAL at 01:08

## 2024-08-08 RX ADMIN — ATORVASTATIN CALCIUM 40 MG: 40 TABLET, FILM COATED ORAL at 08:08

## 2024-08-08 RX ADMIN — Medication 1 CAPSULE: at 01:08

## 2024-08-08 RX ADMIN — CITALOPRAM HYDROBROMIDE 40 MG: 20 TABLET ORAL at 08:08

## 2024-08-08 RX ADMIN — MEMANTINE 10 MG: 5 TABLET ORAL at 08:08

## 2024-08-08 RX ADMIN — PANTOPRAZOLE SODIUM 40 MG: 40 TABLET, DELAYED RELEASE ORAL at 08:08

## 2024-08-08 RX ADMIN — ACETAMINOPHEN 650 MG: 325 TABLET ORAL at 05:08

## 2024-08-08 RX ADMIN — Medication 1 CAPSULE: at 07:08

## 2024-08-08 RX ADMIN — ACETAMINOPHEN 650 MG: 325 TABLET ORAL at 07:08

## 2024-08-08 RX ADMIN — FLUTICASONE PROPIONATE 50 MCG: 50 SPRAY, METERED NASAL at 08:08

## 2024-08-08 NOTE — PT/OT/SLP PROGRESS
Physical Therapy      Patient Name:  Jane Clark   MRN:  [id number]    Patient not seen today secondary to Patient fatigue. Therapist attempted therapy at 1101 and was unable to wake patient up. Therapist attempted therapy again at 1305 with patient found asleep again. Patient's lunch tray was still covered and placed beside patient with nothing ate. Therapist was able to wake patient up and attempted to get patient to sit edge of bed to eat lunch, but patient refused. Therapist attempted to get patient to participate, but patient was unwilling. Will follow-up 08/09/2024.    CARLOZ Silvestre  08/08/2024

## 2024-08-08 NOTE — PT/OT/SLP PROGRESS
Occupational Therapy   Treatment    Name: Jane Clark  MRN: [id number]  Admitting Diagnosis:  Generalized weakness       Recommendations:     Discharge Recommendations: Low Intensity Therapy  Discharge Equipment Recommendations:     Barriers to discharge:       Assessment:     Jane Clark is a 78 y.o. female with a medical diagnosis of Generalized weakness.  She presents with weakness and confusion. Performance deficits affecting function are  .     Rehab Prognosis:  Good; patient would benefit from acute skilled OT services to address these deficits and reach maximum level of function.       Plan:     Patient to be seen 5 x/week to address the above listed problems via self-care/home management, therapeutic activities, therapeutic exercises  Plan of Care Expires:    Plan of Care Reviewed with: patient    Subjective     Chief Complaint: pain and weakness  Patient/Family Comments/goals: to return to prior level of function  Pain/Comfort:       Objective:     Communicated with: Nurse prior to session.  Patient found up in chair with   upon OT entry to room.    General Precautions: Standard, fall, pureed diet    Orthopedic Precautions:   Braces: N/A  Respiratory Status: Room air     Occupational Performance:     Bed Mobility:    Patient completed Rolling/Turning to Left with  minimum assistance  Patient completed Rolling/Turning to Right with minimum assistance  Patient completed Scooting/Bridging with minimum assistance  Patient completed Supine to Sit with minimum assistance  Patient completed Sit to Supine with minimum assistance     Functional Mobility/Transfers:  Patient completed Sit <> Stand Transfer with contact guard assistance  with  rolling walker   Patient completed Bed <> Chair Transfer using Step Transfer technique with contact guard assistance with rolling walker  Functional Mobility: Patient was able to transfer within room with chair with CGA x 10 ft. Chair placed near door to allow patient to  practice functional mobility within room    Activities of Daily Living:  Upper Body Dressing: moderate assistance to [facility] hospital gown  Lower Body Dressing: maximal assistance to tressa socks      [facility] 6 Click ADL:      Treatment & Education:  Pt performed B UE strengthening exercises to include:   UE bike x 3 min with constant tactile cues  Shoulder flexion   Chest press    Elbow flexion   Wrist flexion/extension  All exercises performed 2x10 reps using 1# dowel. Patient required tactile and visual cues to follow commands.      Patient left HOB elevated with all lines intact, call button in reach, bed alarm on, and chair alarm on    GOALS:   Multidisciplinary Problems       Occupational Therapy Goals          Problem: Occupational Therapy    Goal Priority Disciplines Outcome Interventions   Occupational Therapy Goal     OT, PT/OT Progressing    Description: STG:  Pt will perform grooming with Mod A  Pt will perform UE dressing with Mod a  Pt will sit EOB x 15 min with CGA assistance  Pt will transfer bed/chair/bsc with Min A  Pt will perform standing task x 5 min with CGA assistance  Pt will tolerate 20 minutes of tx without fatigue      LT.Restore to max I with self care and mobility.                         Time Tracking:     OT Date of Treatment: 24  OT Start Time: 946  OT Stop Time:   OT Total Time (min): 36 min    Billable Minutes:Therapeutic Activity 16 min  Therapeutic Exercise 20 min             JOANN Arias/L, CSRS  2024

## 2024-08-09 LAB
GLUCOSE SERPL-MCNC: 108 MG/DL (ref 70–105)
GLUCOSE SERPL-MCNC: 115 MG/DL (ref 70–105)
GLUCOSE SERPL-MCNC: 136 MG/DL (ref 70–105)
GLUCOSE SERPL-MCNC: 158 MG/DL (ref 70–105)

## 2024-08-09 PROCEDURE — 99900035 HC TECH TIME PER 15 MIN (STAT)

## 2024-08-09 PROCEDURE — 63600175 PHARM REV CODE 636 W HCPCS: Performed by: FAMILY MEDICINE

## 2024-08-09 PROCEDURE — 11000004 HC SNF PRIVATE

## 2024-08-09 PROCEDURE — 25000003 PHARM REV CODE 250: Performed by: FAMILY MEDICINE

## 2024-08-09 PROCEDURE — 97535 SELF CARE MNGMENT TRAINING: CPT

## 2024-08-09 PROCEDURE — 94761 N-INVAS EAR/PLS OXIMETRY MLT: CPT

## 2024-08-09 PROCEDURE — 94640 AIRWAY INHALATION TREATMENT: CPT

## 2024-08-09 PROCEDURE — 82962 GLUCOSE BLOOD TEST: CPT

## 2024-08-09 PROCEDURE — 97530 THERAPEUTIC ACTIVITIES: CPT | Mod: CQ

## 2024-08-09 PROCEDURE — 25000003 PHARM REV CODE 250: Performed by: NURSE PRACTITIONER

## 2024-08-09 PROCEDURE — 25000242 PHARM REV CODE 250 ALT 637 W/ HCPCS: Performed by: NURSE PRACTITIONER

## 2024-08-09 RX ORDER — LORAZEPAM 0.5 MG/1
1 TABLET ORAL EVERY 8 HOURS PRN
Status: DISCONTINUED | OUTPATIENT
Start: 2024-08-09 | End: 2024-08-13 | Stop reason: HOSPADM

## 2024-08-09 RX ADMIN — BUSPIRONE HYDROCHLORIDE 10 MG: 5 TABLET ORAL at 12:08

## 2024-08-09 RX ADMIN — DONEPEZIL HYDROCHLORIDE 10 MG: 5 TABLET ORAL at 08:08

## 2024-08-09 RX ADMIN — CITALOPRAM HYDROBROMIDE 40 MG: 20 TABLET ORAL at 09:08

## 2024-08-09 RX ADMIN — TRAMADOL HYDROCHLORIDE 50 MG: 50 TABLET, COATED ORAL at 03:08

## 2024-08-09 RX ADMIN — MEMANTINE 10 MG: 5 TABLET ORAL at 09:08

## 2024-08-09 RX ADMIN — MEGESTROL ACETATE 40 MG: 40 TABLET ORAL at 09:08

## 2024-08-09 RX ADMIN — TRAZODONE HYDROCHLORIDE 150 MG: 50 TABLET ORAL at 08:08

## 2024-08-09 RX ADMIN — Medication 1 CAPSULE: at 09:08

## 2024-08-09 RX ADMIN — FLUTICASONE PROPIONATE 50 MCG: 50 SPRAY, METERED NASAL at 09:08

## 2024-08-09 RX ADMIN — IPRATROPIUM BROMIDE AND ALBUTEROL SULFATE 3 ML: 2.5; .5 SOLUTION RESPIRATORY (INHALATION) at 07:08

## 2024-08-09 RX ADMIN — PANTOPRAZOLE SODIUM 40 MG: 40 TABLET, DELAYED RELEASE ORAL at 09:08

## 2024-08-09 RX ADMIN — MEMANTINE 10 MG: 5 TABLET ORAL at 08:08

## 2024-08-09 RX ADMIN — ENOXAPARIN SODIUM 40 MG: 40 INJECTION SUBCUTANEOUS at 05:08

## 2024-08-09 RX ADMIN — LORAZEPAM 1 MG: 0.5 TABLET ORAL at 07:08

## 2024-08-09 RX ADMIN — ATORVASTATIN CALCIUM 40 MG: 40 TABLET, FILM COATED ORAL at 09:08

## 2024-08-09 RX ADMIN — TRAMADOL HYDROCHLORIDE 50 MG: 50 TABLET, COATED ORAL at 08:08

## 2024-08-09 NOTE — NURSING
Spoke to Michael Clark (son) about a family member coming to sit with her. Explained to him that she has gotten out of bed and fallen twice since she has been here. He said that he would talk to his family and try and get someone to come stay with her.

## 2024-08-09 NOTE — PT/OT/SLP PROGRESS
Occupational Therapy   Treatment    Name: Jane Clark  MRN: [id number]  Admitting Diagnosis:  Generalized weakness       Recommendations:     Discharge Recommendations: Low Intensity Therapy  Discharge Equipment Recommendations:     Barriers to discharge:       Assessment:     Jane Clark is a 78 y.o. female with a medical diagnosis of Generalized weakness.  She presents with confusion and agitation. Performance deficits affecting function are  .     Rehab Prognosis:  Fair; patient would benefit from acute skilled OT services to address these deficits and reach maximum level of function.       Plan:     Patient to be seen 5 x/week to address the above listed problems via self-care/home management, therapeutic activities, therapeutic exercises  Plan of Care Expires:    Plan of Care Reviewed with: patient    Subjective     Chief Complaint: no complaints  Patient/Family Comments/goals: to go home  Pain/Comfort:       Objective:     Communicated with: Nurse prior to session.  Patient found HOB elevated with   upon OT entry to room.    General Precautions: Standard, fall, pureed diet    Orthopedic Precautions:   Braces: N/A  Respiratory Status: Room air     Occupational Performance:     Bed Mobility:    Patient completed Rolling/Turning to Left with  minimum assistance  Patient completed Rolling/Turning to Right with minimum assistance  Patient completed Scooting/Bridging with minimum assistance  Patient completed Supine to Sit with minimum assistance  Patient completed Sit to Supine with minimum assistance     Functional Mobility/Transfers:  Patient completed Sit <> Stand Transfer with minimum assistance  with  rolling walker   Functional Mobility: Patient very confused and resisted walking to bathroom today. ADLs performed sitting EOB for oral hygiene.    Activities of Daily Living:  Grooming: maximal assistance to perform oral hygiene and clean dentures sitting EOB. Patient required constant cueing to  participate.      [facility] 6 Click ADL:      Treatment & Education:  Pt educated on OT role/POC.   Importance of OOB activity with staff assistance.  Importance of sitting up in the chair throughout the day as tolerated, especially for meals   Safety during functional t/f and mobility  Importance of assisting with self-care activities      Patient left up in chair with all lines intact, call button in reach, bed alarm on, and chair alarm on    GOALS:   Multidisciplinary Problems       Occupational Therapy Goals          Problem: Occupational Therapy    Goal Priority Disciplines Outcome Interventions   Occupational Therapy Goal     OT, PT/OT Progressing    Description: STG:  Pt will perform self feeding with SBA using adaptive equipment as indicated.   Pt will perform grooming with Mod A  Pt will perform UE dressing with Mod a  Pt will sit EOB x 15 min with CGA assistance  Pt will transfer bed/chair/bsc with Min A  Pt will perform standing task x 5 min with CGA assistance  Pt will tolerate 20 minutes of tx without fatigue      LT.Restore to max I with self care and mobility.                         Time Tracking:     OT Date of Treatment: 24  OT Start Time: 1245  OT Stop Time: 1301  OT Total Time (min): 16 min    Billable Minutes:Self Care/Home Management 16 min             JOANN Arias/L, CSRS  2024

## 2024-08-09 NOTE — PROGRESS NOTES
Called to patient's room by RN stating they found the patient seated in the floor next to her bed. The patient has known dementia and is unsure if she fell. She initially reported no pain but did have some left hip tenderness on palpation so we will provide PRN Tylenol for pain control and perform an x-ray of the left hip with pelvis for further evaluation. No concerning signs for head trauma, no cervical tenderness, no erythema, no ecchymosis, or other visual abnormalities on exam. Breath sounds were equal and clear to auscultation. No abd tenderness on palpation.    Preliminary findings of the left hip and pelvis x-ray showed no evidence of fracture or dislocation.

## 2024-08-09 NOTE — PLAN OF CARE
Problem: Occupational Therapy  Goal: Occupational Therapy Goal  Description: STG:  Pt will perform self feeding with SBA using adaptive equipment as indicated.   Pt will perform grooming with Mod A  Pt will perform UE dressing with Mod a  Pt will sit EOB x 15 min with CGA assistance  Pt will transfer bed/chair/bsc with Min A  Pt will perform standing task x 5 min with CGA assistance  Pt will tolerate 20 minutes of tx without fatigue      LT.Restore to max I with self care and mobility.    Outcome: Progressing

## 2024-08-09 NOTE — PT/OT/SLP PROGRESS
"Physical Therapy Treatment    Patient Name:  Jane Clark   MRN:  [id number]    Recommendations:     Discharge Recommendations: Low Intensity Therapy  Discharge Equipment Recommendations: none  Barriers to discharge: None    Assessment:     Jane Clark is a 78 y.o. female admitted with a medical diagnosis of Generalized weakness.  She presents with the following impairments/functional limitations: weakness, impaired self care skills, impaired functional mobility, gait instability, impaired balance, impaired cognition, decreased lower extremity function, decreased safety awareness Patient was found laying on right side asleep upon arrival. Patient was able to wake up easily. Patient's breakfast tray was found on table, covered, and with no food gone. Patient was agreeable to sit edge of bed to eat her breakfast with assistance from therapist. Patient took a few bites before requesting to lay down due to hip pain. Once patient was assisted back to bed, therapist noticed her bead sheets saturated in urine. Therapist got help from CNAs to change patient's bed sheets. Once patient and patient's bed was clean, therapist asked if patient wanted any more help eating her breakfast with patient refusing. Therapist also noticed throughout therapy session that patient was very emotional. Patient would cry and say, "please do not leave me".     Rehab Prognosis: Fair; patient would benefit from acute skilled PT services to address these deficits and reach maximum level of function.    Recent Surgery: * No surgery found *      Plan:     During this hospitalization, patient to be seen 5 x/week to address the identified rehab impairments via gait training, therapeutic activities, therapeutic exercises, neuromuscular re-education and progress toward the following goals:    Plan of Care Expires:  08/22/24    Subjective     Chief Complaint: pain, fear of being left alone  Patient/Family Comments/goals: family considering patient " going home on hospice   Pain/Comfort:  Pain Rating 1: other (see comments) (Art grabbing at hip in pain.)      Objective:     Communicated with nurse prior to session.  Patient found right sidelying with bed alarm, chair check upon PT entry to room.     General Precautions: Standard, fall  Orthopedic Precautions: N/A  Braces: N/A  Respiratory Status: Room air     Functional Mobility:  Bed Mobility:     Rolling Left:  moderate assistance  Rolling Right: moderate assistance  Scooting: maximal assistance  Supine to Sit: moderate assistance  Sit to Supine: moderate assistance  Transfers:    Attempted, but patient resisted therapist due to hip pain.       AM-PAC 6 CLICK MOBILITY  Turning over in bed (including adjusting bedclothes, sheets and blankets)?: 2  Sitting down on and standing up from a chair with arms (e.g., wheelchair, bedside commode, etc.): 2  Moving from lying on back to sitting on the side of the bed?: 2  Moving to and from a bed to a chair (including a wheelchair)?: 2  Need to walk in hospital room?: 2  Climbing 3-5 steps with a railing?: 2  Basic Mobility Total Score: 12       Treatment & Education:  Transfers as listed above - increased assistance needed due to her dementia causing her to be unable to follow commands easily   Patient performed sitting balance while being assisted to eat breakfast from therapist. Patient is unable to use her spoon on her own due to not knowing how. Patient also required min a at times to correct balance while in sitting. Patient then became fearful and holding her hip while she was sitting. Therapist confirmed with patient that her hip was hurting and she would like to lay down. Therapist then noticed urine on her bed sheets and assisted CNAs with changing them. Patient was unable to fully follow commands for bed mobility and required multiple verbal and tactile cues in order to complete.     Patient left HOB elevated with call button in reach, bed alarm on, chair  alarm on, and nurse present..    GOALS:   Multidisciplinary Problems       Physical Therapy Goals          Problem: Physical Therapy    Goal Priority Disciplines Outcome Goal Variances Interventions   Physical Therapy Goal     PT, PT/OT Progressing     Description: Short-term Goals: 1.5 weeks  1. Patient will perform supine to/from sit with moderate assistance to improve independence and safety with bed mobility.  2. Patient will perform sit to/from stand with a rolling walker with moderate assistance to improve independence and safety with transfers.  3. Patient will ambulate 15 feet with a rolling walker with moderate assistance to improve independence and safety with gait.  4. Patient will tolerate 15 minutes of physical therapy intervention to improve endurance and activity tolerance.    Long-term goals: 3 weeks  1. Patient will perform supine to/from sit with contact guard assist to improve independence and safety with bed mobility.  2. Patient will perform sit to/from stand with a rolling walker with contact guard assist to improve independence and safety with transfers.  3. Patient will ambulate 50 feet with a rolling walker with contact guard assist to improve independence and safety with gait.  4. Patient will tolerate 30 minutes of physical therapy intervention to improve endurance and activity tolerance.                        Time Tracking:     PT Received On: 08/09/24  PT Start Time: 0919     PT Stop Time: 0943  PT Total Time (min): 24 min     Billable Minutes: Therapeutic Activity 24    Treatment Type: Treatment  PT/PTA: PTA     Number of PTA visits since last PT visit: 3   CARLOZ Silvestre  08/09/2024

## 2024-08-09 NOTE — PROGRESS NOTES
[facility]   Adult Nutrition  Follow-up Note         Reason for Assessment  Reason For Assessment: RD follow-up weight loss  Nutrition Risk Screen: no indicators present    Assessment and Plan  8/9/2024: RD follow up. Patient remains on a dysphagia puree diet with Boost Plus with meals. Documented intake 50-75% per flowsheet. Patient is ordered megestrol daily to stimulate appetite. Noted to have episodes of confusion and refusing meals. Recommend continue current diet as tolerated. Encourage good PO intakes.Current weight 46.4kg. Last BM  8/8 per flowsheet. RD following.    8/4/2024 Consult received and appreciated: Patient admitted to [facility] on 8/3 from [facility]. Noted plans for hospice on d/c from [facility].    Patient ordered a dysphagia pureed diet. Limited po intakes documented per flowsheets. 75% documentation noted. Recommend to resume Boost Plus all meals. Encourage po intakes and assist with all meals as needed. RD Following.       Consult received and appreciated. Patient admitted 7/31 with a dx of metabolic encephalopathy, UTI, anemia, and alzheimer's dementia. Patient is ordered a dysphagia pureed diet. No po intakes documented per flowsheets.     Patient is 113 pounds with a BMI of 18.80 which is underweight per geriatric standards. Per chart review patient has had significant weight loss of 14% over the last 6 months. Poor po intakes endorsed from family per MST screen noted. Due to poor po intakes and significant weight loss, patient meets ASPEN criteria for severe protein calorie malnutrition.     Wt Readings from Last 12 Encounters:   08/07/24 46.4 kg (102 lb 6.4 oz)   07/31/24 51.3 kg (113 lb)   07/31/24 51.3 kg (113 lb)   07/27/24 52.2 kg (115 lb)   07/18/24 51.3 kg (113 lb)   07/10/24 52.2 kg (115 lb)   03/21/24 55.3 kg (122 lb)   03/12/24 54.9 kg (121 lb)   02/09/24 54.4 kg (120 lb)   02/05/24 59.9 kg (132 lb)   01/10/24 59.9 kg (132 lb)   12/19/23 60 kg (132 lb 4.8 oz)       Recommend  to add Boost Plus all meals, assist with all meals as needed. RD Following.       Learning Needs/Social Determinants of Health  Learning Assessment       07/31/2024 1535 [facility] (7/31/2024 - Present)   Created by Niesha Alcantar RN - RN (Nurse) Status: Complete                 PRIMARY LEARNER     Primary Learner Name:  Eduardo Lara  - 07/31/2024 1535    Relationship:  Patient RC - 07/31/2024 1535    Does the primary learner have any barriers to learning?:  Cognitive RC - 07/31/2024 1535    What is the preferred language of the primary learner?:  English RC - 07/31/2024 1535    Is an  required?:  No  - 07/31/2024 1535    How does the primary learner prefer to learn new concepts?:  Listening, Reading, Demonstration RC - 07/31/2024 1535    How often do you need to have someone help you read instructions, pamphlets, or written material from your doctor or pharmacy?:  Often  - 07/31/2024 1535        CO-LEARNER #1     No question answered        CO-LEARNER #2     No question answered        SPECIAL TOPICS     No question answered        ANSWERED BY:     No question answered        Comments         Edit History       Niesha Alcantar, RN - RN (Nurse)   07/31/2024 1535                          Social Determinants of Health     Tobacco Use: Low Risk  (7/31/2024)    Patient History     Smoking Tobacco Use: Never     Smokeless Tobacco Use: Never     Passive Exposure: Never   Alcohol Use: Not At Risk (8/5/2024)    AUDIT-C     Frequency of Alcohol Consumption: Never     Average Number of Drinks: Patient does not drink     Frequency of Binge Drinking: Never   Financial Resource Strain: Low Risk  (8/5/2024)    Overall Financial Resource Strain (CARDIA)     Difficulty of Paying Living Expenses: Not hard at all   Food Insecurity: No Food Insecurity (8/5/2024)    Hunger Vital Sign     Worried About Running Out of Food in the Last Year: Never true     Ran Out of Food in the Last Year: Never true    Transportation Needs: No Transportation Needs (8/5/2024)    TRANSPORTATION NEEDS     Transportation : No   Physical Activity: Inactive (8/5/2024)    Exercise Vital Sign     Days of Exercise per Week: 0 days     Minutes of Exercise per Session: 0 min   Stress: No Stress Concern Present (8/5/2024)    [employer] [place] [facility] [employer] [facility] - Occupational Stress Questionnaire     Feeling of Stress : Not at all   Housing Stability: Low Risk  (8/5/2024)    Housing Stability Vital Sign     Unable to Pay for Housing in the Last Year: No     Homeless in the Last Year: No   Depression: Low Risk  (7/31/2024)    Depression     Last PHQ-4: Flowsheet Data: 0   Utilities: Not At Risk (8/5/2024)    [facility] Utilities     Threatened with loss of utilities: No   Health Literacy: Inadequate Health Literacy (8/5/2024)     Health Literacy     Frequency of need for help with medical instructions: Always   Social Isolation: Socially Integrated (8/5/2024)    Social Isolation     Social Isolation: 1            Malnutrition  Is Patient Malnourished: Yes Malnutrition Assessment  Malnutrition Context: chronic illness  Malnutrition Level: severe          Weight Loss (Malnutrition): greater than 10% in 6 months  Energy Intake (Malnutrition): less than or equal to 75% for greater than or equal to 1 month                         Nutrition Diagnosis  Malnutrition (Severe) related to Appetite loss, Decreased ability to consume sufficient energy, and Impaired cognitive or learning abilities /psychological causes/ Altered Mental Status as evidenced by significant weight loss of greater than 10% in 6 months and energy intake less than or equal 75% for greater than or equal to 1 month  Comments: add Boost Plus. Encourage po intakes, Nursing assist with all meals as needed    Recent Labs   Lab 08/08/24  0350 08/08/24  0616 08/09/24  0633   *  --   --    POCGLU  --    < > 108*    < > = values in this interval not displayed.     Comments on  Glucose: WNL    Nutrition Prescription / Recommendations  Nutrition Goal Status: new     Chewing or Swallowing Difficulty?: pureed diet  Recommended Diet: Puree  Recommended Oral Supplement: Boost Plus [360kcals, 14g Protein, 45g Carbs] 3 times a day  Is Nutrition Support Recommended: [facility] Support: No  Is Nutrition Education Recommended: No    Monitor and Evaluation  % current Intake: New Diet order with no P.O. intake documented currently  % intake to meet estimated needs: 50 - 75 %          Current Medical Diagnosis and Past Medical History     Past Medical History:   Diagnosis Date    Achrochordon     Aphasia as late effect of cerebrovascular accident     Cervicalgia     Diabetes mellitus, type 2     Hiatal hernia with GERD     Insomnia secondary to depression with anxiety     Mixed hyperlipidemia 03/02/2022    Primary hypertension 03/02/2022    Senile dementia with behavioral disturbance     Sleep related leg cramps     Stroke     TMJ (dislocation of temporomandibular joint)     Vascular dementia with behavior disturbance 03/02/2022    Vitamin B 12 deficiency     Vitamin D deficiency        Nutrition/Diet History  Spiritual, Cultural Beliefs, Latter day Practices, Values that Affect Care: no    Lab/Procedures/Meds  Recent Labs   Lab 08/08/24  0350      K 3.6   BUN 17   CREATININE 0.97   CALCIUM 9.0      PHOS 3.6     Last A1c:   Lab Results   Component Value Date    HGBA1C 5.5 11/21/2023     Lab Results   Component Value Date    RBC 4.14 (L) 08/08/2024    HGB 11.8 (L) 08/08/2024    HCT 36.1 (L) 08/08/2024    MCV 87.2 08/08/2024    MCH 28.5 08/08/2024    MCHC 32.7 08/08/2024     Pertinent Labs Reviewed: reviewed  Pertinent Medications Reviewed: reviewed  Scheduled Meds:   albuterol-ipratropium  3 mL Nebulization Q12H    atorvastatin  40 mg Oral Daily    busPIRone  10 mg Oral Daily    citalopram  40 mg Oral Daily    donepeziL  10 mg Oral QHS    enoxparin  40 mg Subcutaneous Q24H  "(prophylaxis, 1700)    fluticasone propionate  1 spray Each Nostril Daily    Lactobacillus acidophilus  1 capsule Oral TID WM    megestroL  40 mg Oral Daily    memantine  10 mg Oral BID    pantoprazole  40 mg Oral Daily    traZODone  150 mg Oral QHS     Continuous Infusions:      PRN Meds:.  Current Facility-Administered Medications:     acetaminophen, 650 mg, Oral, Q6H PRN    albuterol-ipratropium, 3 mL, Nebulization, Q6H PRN    calcium carbonate, 500 mg, Oral, BID PRN    ondansetron, 4 mg, Oral, Q8H PRN    senna-docusate 8.6-50 mg, 1 tablet, Oral, BID PRN    traMADoL, 50 mg, Oral, Q6H PRN    Anthropometrics  Temp: 98.3 °F (36.8 °C)  Height Method: Stated  Height: 5' 5" (165.1 cm)  Height (inches): 65 in  Weight Method: Bed Scale  Weight: 46.4 kg (102 lb 6.4 oz)  Weight (lb): 102.4 lb  Ideal Body Weight (IBW), Female: 125 lb  % Ideal Body Weight, Female (lb): 89.76 %  BMI (Calculated): 17       Estimated/Assessed Needs      Temp: 98.3 °F (36.8 °C)Oral  Weight Used For Calorie Calculations: 50.9 kg (112 lb 3.4 oz)   Energy Need Method: Kcal/kg Energy Calorie Requirements (kcal): 6772-8480  Weight Used For Protein Calculations: 50.9 kg (112 lb 3.4 oz)  Protein Requirements: 51-61  Estimated Fluid Requirement Method: RDA Method    RDA Method (mL): 1273       Nutrition by Nursing  Diet/Nutrition Received: mechanical/dental soft  Intake (%): 75%  Diet/Feeding Assistance: assisted with feeding     Last Bowel Movement: 08/08/24                Nutrition Follow-Up  RD Follow-up?: Yes                 Available via Secure Chat  "

## 2024-08-09 NOTE — PROGRESS NOTES
[facility] - [facility] Surgical Unit  Hospital Medicine  Progress Note    Patient Name: Jane Clark  MRN: [id number]  Patient Class: IP- Swing   Admission Date: 8/3/2024  Length of Stay: 6 days  Attending Physician: Chapito Plaza IV, DO  Primary Care Provider: Didi Singer NP        Subjective:     Principal Problem:Generalized weakness        HPI:  78 year old pleasantly demented  female presented to our facility today from [facility] for admission into swing bed for generalized weakness and treatment for UTI.  Pt has known hx of dementia, DM, HLD, HTN and CVA.  She went in to the ER at [facility] on 7/27/24 and was diagnosed with UTI, placed on Cefdinir.  She went to PCP clinic on 7/31/24 for decreased PO intake, refusal to take PO antibiotic and increased confusion.  They did lab which showed WBC 13 and BUN 27 for abnormals.  They contacted Dr Freire and had patient direct admitted to hospital services.  While admitted, patient received hydration and was started on Levaquin for UTI.  Due to her weakness, the decision was made to transfer her to our facility to complete her antibiotic therapy and work with PT/OT therapies for help improved strength/coordination.  Patient's daughter was here with her upon arrival, and she has chosen for patient to be DNR.  Pt remains pleasantly confused and is at baseline per family.      Overview/Hospital Course:  08/06/24 Awake and resting in bed. Denies SOB and pain. No distress noted. CNA reports Mrs. Man eating 50% of meals served. Walked 17 feet with therapist today. Continue therapy for strengthening. Continue levaquin.  08/09/24 Resting in bed. Awakens when called. Denies pain. Night shift reports her having fall last night. Had imaging of left hip and pelvis- shows mild osteoarthritis. Continue PT and OT for strengthening.    Interval History: weakness    Review of Systems   Unable to perform ROS: Dementia     Objective:     Vital  Signs (Most Recent):  Temp: 98.3 °F (36.8 °C) (08/09/24 0716)  Pulse: 71 (08/09/24 0716)  Resp: 18 (08/09/24 0716)  BP: 131/72 (08/09/24 0716)  SpO2: 96 % (08/09/24 0716) Vital Signs (24h Range):  Temp:  [98.3 °F (36.8 °C)-98.6 °F (37 °C)] 98.3 °F (36.8 °C)  Pulse:  [] 71  Resp:  [18-20] 18  SpO2:  [96 %-98 %] 96 %  BP: (125-138)/(66-72) 131/72     Weight: 46.4 kg (102 lb 6.4 oz)  Body mass index is 17.04 kg/m².    Intake/Output Summary (Last 24 hours) at 8/9/2024 1042  Last data filed at 8/9/2024 0953  Gross per 24 hour   Intake 300 ml   Output --   Net 300 ml         Physical Exam  HENT:      Head: Normocephalic.   Cardiovascular:      Rate and Rhythm: Normal rate and regular rhythm.      Pulses: Normal pulses.      Heart sounds: Normal heart sounds.   Pulmonary:      Breath sounds: Normal breath sounds.   Abdominal:      General: Bowel sounds are normal. There is no distension.      Palpations: Abdomen is soft. There is no mass.      Tenderness: There is no abdominal tenderness.      Hernia: No hernia is present.   Musculoskeletal:         General: Normal range of motion.      Cervical back: Normal range of motion.   Skin:     General: Skin is warm and dry.   Neurological:      Mental Status: She is disoriented.             Significant Labs: All pertinent labs within the past 24 hours have been reviewed.  Recent Lab Results         08/09/24  0633   08/08/24  2058   08/08/24  1606   08/08/24  1209        POC Glucose 108   142   183   119               Significant Imaging: I have reviewed all pertinent imaging results/findings within the past 24 hours.    Assessment/Plan:      * Generalized weakness    PT/OT assessment    08/06/24 walked 17 feet with therapist      08/09/24 continue therapy       UTI (urinary tract infection)    Continue her Levaquin for 5 days      08/09 has completed therapy    Dementia with behavioral disturbance  Dementia is moderate. The patient does not have signs of behavioral  disturbance. Home dementia medications are Held or Continued: continued.. Continue non-pharmacologic interventions to prevent delirium (No VS between 11PM-5AM, activity during day, opening blinds, providing glasses/hearing aids, and up in chair during daytime). Will avoid narcotics and benzos unless absolutely necessary.     Primary hypertension  Chronic, stable. Latest blood pressure and vitals reviewed-     Temp:  [97.7 °F (36.5 °C)-98.2 °F (36.8 °C)]   Pulse:  []   Resp:  [17-22]   BP: (120-148)/()   SpO2:  [94 %-98 %] .   Home meds for hypertension were reviewed and noted below.       While in the hospital, will manage blood pressure as follows; Continue home antihypertensive regimen      Mixed hyperlipidemia  Continue atorvastatin        VTE Risk Mitigation (From admission, onward)           Ordered     enoxaparin injection 40 mg  Every 24 hours         08/05/24 1104     IP VTE HIGH RISK PATIENT  Once         08/03/24 1320     Place sequential compression device  Until discontinued         08/03/24 1320                    Discharge Planning   JUDE: 8/22/2024     Code Status: DNR   Is the patient medically ready for discharge?:     Reason for patient still in hospital (select all that apply): Treatment  Discharge Plan A: Home with family, Hospice/home                  MASON Bartholomew  Department of Hospital Medicine   [facility]

## 2024-08-09 NOTE — SUBJECTIVE & OBJECTIVE
Interval History: weakness    Review of Systems   Unable to perform ROS: Dementia     Objective:     Vital Signs (Most Recent):  Temp: 98.3 °F (36.8 °C) (08/09/24 0716)  Pulse: 71 (08/09/24 0716)  Resp: 18 (08/09/24 0716)  BP: 131/72 (08/09/24 0716)  SpO2: 96 % (08/09/24 0716) Vital Signs (24h Range):  Temp:  [98.3 °F (36.8 °C)-98.6 °F (37 °C)] 98.3 °F (36.8 °C)  Pulse:  [] 71  Resp:  [18-20] 18  SpO2:  [96 %-98 %] 96 %  BP: (125-138)/(66-72) 131/72     Weight: 46.4 kg (102 lb 6.4 oz)  Body mass index is 17.04 kg/m².    Intake/Output Summary (Last 24 hours) at 8/9/2024 1042  Last data filed at 8/9/2024 0953  Gross per 24 hour   Intake 300 ml   Output --   Net 300 ml         Physical Exam  HENT:      Head: Normocephalic.   Cardiovascular:      Rate and Rhythm: Normal rate and regular rhythm.      Pulses: Normal pulses.      Heart sounds: Normal heart sounds.   Pulmonary:      Breath sounds: Normal breath sounds.   Abdominal:      General: Bowel sounds are normal. There is no distension.      Palpations: Abdomen is soft. There is no mass.      Tenderness: There is no abdominal tenderness.      Hernia: No hernia is present.   Musculoskeletal:         General: Normal range of motion.      Cervical back: Normal range of motion.   Skin:     General: Skin is warm and dry.   Neurological:      Mental Status: She is disoriented.             Significant Labs: All pertinent labs within the past 24 hours have been reviewed.  Recent Lab Results         08/09/24  0633   08/08/24  2058   08/08/24  1606   08/08/24  1209        POC Glucose 108   142   183   119               Significant Imaging: I have reviewed all pertinent imaging results/findings within the past 24 hours.

## 2024-08-10 LAB
GLUCOSE SERPL-MCNC: 108 MG/DL (ref 70–105)
GLUCOSE SERPL-MCNC: 111 MG/DL (ref 70–105)
GLUCOSE SERPL-MCNC: 112 MG/DL (ref 70–105)
GLUCOSE SERPL-MCNC: 119 MG/DL (ref 70–105)

## 2024-08-10 PROCEDURE — 94761 N-INVAS EAR/PLS OXIMETRY MLT: CPT

## 2024-08-10 PROCEDURE — 94640 AIRWAY INHALATION TREATMENT: CPT

## 2024-08-10 PROCEDURE — 82962 GLUCOSE BLOOD TEST: CPT

## 2024-08-10 PROCEDURE — 63600175 PHARM REV CODE 636 W HCPCS: Performed by: FAMILY MEDICINE

## 2024-08-10 PROCEDURE — 25000242 PHARM REV CODE 250 ALT 637 W/ HCPCS: Performed by: NURSE PRACTITIONER

## 2024-08-10 PROCEDURE — 27000982 HC MATTRESS, MATRIX LAL RENTAL

## 2024-08-10 PROCEDURE — 25000003 PHARM REV CODE 250: Performed by: FAMILY MEDICINE

## 2024-08-10 PROCEDURE — 25000003 PHARM REV CODE 250: Performed by: NURSE PRACTITIONER

## 2024-08-10 PROCEDURE — 27000944

## 2024-08-10 PROCEDURE — 11000004 HC SNF PRIVATE

## 2024-08-10 RX ADMIN — TRAZODONE HYDROCHLORIDE 150 MG: 50 TABLET ORAL at 08:08

## 2024-08-10 RX ADMIN — TRAMADOL HYDROCHLORIDE 50 MG: 50 TABLET, COATED ORAL at 10:08

## 2024-08-10 RX ADMIN — BUSPIRONE HYDROCHLORIDE 10 MG: 5 TABLET ORAL at 11:08

## 2024-08-10 RX ADMIN — ENOXAPARIN SODIUM 40 MG: 40 INJECTION SUBCUTANEOUS at 04:08

## 2024-08-10 RX ADMIN — MEMANTINE 10 MG: 5 TABLET ORAL at 09:08

## 2024-08-10 RX ADMIN — IPRATROPIUM BROMIDE AND ALBUTEROL SULFATE 3 ML: 2.5; .5 SOLUTION RESPIRATORY (INHALATION) at 07:08

## 2024-08-10 RX ADMIN — CALCIUM CARBONATE 500 MG: 500 TABLET, CHEWABLE ORAL at 03:08

## 2024-08-10 RX ADMIN — FLUTICASONE PROPIONATE 50 MCG: 50 SPRAY, METERED NASAL at 09:08

## 2024-08-10 RX ADMIN — LORAZEPAM 1 MG: 0.5 TABLET ORAL at 03:08

## 2024-08-10 RX ADMIN — MEGESTROL ACETATE 40 MG: 40 TABLET ORAL at 09:08

## 2024-08-10 RX ADMIN — ATORVASTATIN CALCIUM 40 MG: 40 TABLET, FILM COATED ORAL at 09:08

## 2024-08-10 RX ADMIN — PANTOPRAZOLE SODIUM 40 MG: 40 TABLET, DELAYED RELEASE ORAL at 09:08

## 2024-08-10 RX ADMIN — MEMANTINE 10 MG: 5 TABLET ORAL at 08:08

## 2024-08-10 RX ADMIN — DONEPEZIL HYDROCHLORIDE 10 MG: 5 TABLET ORAL at 08:08

## 2024-08-10 RX ADMIN — CITALOPRAM HYDROBROMIDE 40 MG: 20 TABLET ORAL at 09:08

## 2024-08-10 NOTE — PLAN OF CARE
Problem: Adult Inpatient Plan of Care  Goal: Plan of Care Review  Outcome: Progressing  Goal: Patient-Specific Goal (Individualized)  Outcome: Progressing  Goal: Absence of Hospital-Acquired Illness or Injury  Outcome: Progressing  Goal: Optimal Comfort and Wellbeing  Outcome: Progressing     Problem: Skin Injury Risk Increased  Goal: Skin Health and Integrity  Outcome: Progressing

## 2024-08-11 LAB
GLUCOSE SERPL-MCNC: 112 MG/DL (ref 70–105)
GLUCOSE SERPL-MCNC: 128 MG/DL (ref 70–105)
GLUCOSE SERPL-MCNC: 152 MG/DL (ref 70–105)
GLUCOSE SERPL-MCNC: 156 MG/DL (ref 70–105)

## 2024-08-11 PROCEDURE — 94640 AIRWAY INHALATION TREATMENT: CPT

## 2024-08-11 PROCEDURE — 63600175 PHARM REV CODE 636 W HCPCS: Performed by: FAMILY MEDICINE

## 2024-08-11 PROCEDURE — 25000003 PHARM REV CODE 250: Performed by: NURSE PRACTITIONER

## 2024-08-11 PROCEDURE — 11000004 HC SNF PRIVATE

## 2024-08-11 PROCEDURE — 94761 N-INVAS EAR/PLS OXIMETRY MLT: CPT

## 2024-08-11 PROCEDURE — 82962 GLUCOSE BLOOD TEST: CPT

## 2024-08-11 PROCEDURE — 25000003 PHARM REV CODE 250: Performed by: FAMILY MEDICINE

## 2024-08-11 PROCEDURE — 27000944

## 2024-08-11 PROCEDURE — 99900035 HC TECH TIME PER 15 MIN (STAT)

## 2024-08-11 PROCEDURE — 27000982 HC MATTRESS, MATRIX LAL RENTAL

## 2024-08-11 PROCEDURE — 25000242 PHARM REV CODE 250 ALT 637 W/ HCPCS: Performed by: NURSE PRACTITIONER

## 2024-08-11 RX ADMIN — ACETAMINOPHEN 650 MG: 325 TABLET ORAL at 03:08

## 2024-08-11 RX ADMIN — BUSPIRONE HYDROCHLORIDE 10 MG: 5 TABLET ORAL at 11:08

## 2024-08-11 RX ADMIN — IPRATROPIUM BROMIDE AND ALBUTEROL SULFATE 3 ML: 2.5; .5 SOLUTION RESPIRATORY (INHALATION) at 07:08

## 2024-08-11 RX ADMIN — FLUTICASONE PROPIONATE 50 MCG: 50 SPRAY, METERED NASAL at 09:08

## 2024-08-11 RX ADMIN — CITALOPRAM HYDROBROMIDE 40 MG: 20 TABLET ORAL at 09:08

## 2024-08-11 RX ADMIN — TRAMADOL HYDROCHLORIDE 50 MG: 50 TABLET, COATED ORAL at 10:08

## 2024-08-11 RX ADMIN — ATORVASTATIN CALCIUM 40 MG: 40 TABLET, FILM COATED ORAL at 09:08

## 2024-08-11 RX ADMIN — MEMANTINE 10 MG: 5 TABLET ORAL at 09:08

## 2024-08-11 RX ADMIN — ENOXAPARIN SODIUM 40 MG: 40 INJECTION SUBCUTANEOUS at 05:08

## 2024-08-11 RX ADMIN — TRAZODONE HYDROCHLORIDE 150 MG: 50 TABLET ORAL at 08:08

## 2024-08-11 RX ADMIN — PANTOPRAZOLE SODIUM 40 MG: 40 TABLET, DELAYED RELEASE ORAL at 09:08

## 2024-08-11 RX ADMIN — LORAZEPAM 1 MG: 0.5 TABLET ORAL at 03:08

## 2024-08-11 RX ADMIN — MEGESTROL ACETATE 40 MG: 40 TABLET ORAL at 09:08

## 2024-08-11 RX ADMIN — MEMANTINE 10 MG: 5 TABLET ORAL at 08:08

## 2024-08-11 RX ADMIN — DONEPEZIL HYDROCHLORIDE 10 MG: 5 TABLET ORAL at 08:08

## 2024-08-11 NOTE — NURSING
Virtual Fall Prevention was put into place on 8/9/2024. Patient continues to try and get out of bed. She has expressed to nurse that she wants her family. Wants someone to stay with her at all times. Have spoken to her son Michael Clark twice about someone coming to sit with her but no one has come. Spoke with him again today and he said that they are still trying to figure something out and that some family was coming to see her today and he will come later this afternoon. Will cont to monitor.

## 2024-08-11 NOTE — PLAN OF CARE
Problem: Adult Inpatient Plan of Care  Goal: Plan of Care Review  Outcome: Progressing  Flowsheets (Taken 8/10/2024 2243)  Plan of Care Reviewed With: patient  Goal: Patient-Specific Goal (Individualized)  Outcome: Progressing  Flowsheets (Taken 8/10/2024 2243)  Patient/Family-Specific Goals (Include Timeframe): to go home, patient states she wants to go home  Goal: Absence of Hospital-Acquired Illness or Injury  Outcome: Progressing  Intervention: Identify and Manage Fall Risk  Flowsheets (Taken 8/10/2024 2243)  Safety Promotion/Fall Prevention:   assistive device/personal item within reach   bed alarm set   chair alarm set   supervised activity   room near unit station   instructed to call staff for mobility   Fall Risk reviewed with patient/family   diversional activities provided   nonskid shoes/socks when out of bed   observed patient noncompliance with fall prevention instructions  Intervention: Prevent Skin Injury  Flowsheets (Taken 8/10/2024 2243)  Body Position:   neutral body alignment   neutral head position   position changed independently  Skin Protection: incontinence pads utilized  Device Skin Pressure Protection:   absorbent pad utilized/changed   positioning supports utilized  Intervention: Prevent and Manage VTE (Venous Thromboembolism) Risk  Flowsheets (Taken 8/10/2024 2243)  VTE Prevention/Management: (plantar flexion performed, patient not able to perform doriflexion at this time)   dorsiflexion/plantar flexion performed   fluids promoted  Intervention: Prevent Infection  Flowsheets (Taken 8/10/2024 2243)  Infection Prevention:   hand hygiene promoted   rest/sleep promoted   personal protective equipment utilized   equipment surfaces disinfected  Goal: Optimal Comfort and Wellbeing  Outcome: Progressing  Intervention: Monitor Pain and Promote Comfort  Flowsheets (Taken 8/10/2024 2243)  Pain Management Interventions:   pillow support provided   position adjusted   quiet environment  facilitated   relaxation techniques promoted   diversional activity provided  Intervention: Provide Person-Centered Care  Flowsheets (Taken 8/10/2024 2243)  Trust Relationship/Rapport:   care explained   choices provided   questions answered   questions encouraged   thoughts/feelings acknowledged  Goal: Readiness for Transition of Care  Outcome: Progressing     Problem: Fall Injury Risk  Goal: Absence of Fall and Fall-Related Injury  Outcome: Progressing  Intervention: Identify and Manage Contributors  Flowsheets (Taken 8/10/2024 2243)  Self-Care Promotion: (snacks priovided)   independence encouraged   other (see comments)  Medication Review/Management:   medications reviewed   high-risk medications identified  Intervention: Promote Injury-Free Environment  Flowsheets (Taken 8/10/2024 2243)  Safety Promotion/Fall Prevention:   assistive device/personal item within reach   bed alarm set   chair alarm set   supervised activity   room near unit station   instructed to call staff for mobility   Fall Risk reviewed with patient/family   diversional activities provided   nonskid shoes/socks when out of bed   observed patient noncompliance with fall prevention instructions     Problem: Skin Injury Risk Increased  Goal: Skin Health and Integrity  Outcome: Progressing  Intervention: Optimize Skin Protection  Flowsheets (Taken 8/10/2024 2243)  Skin Protection: incontinence pads utilized  Intervention: Promote and Optimize Oral Intake  Flowsheets (Taken 8/10/2024 2243)  Oral Nutrition Promotion: rest periods promoted

## 2024-08-11 NOTE — PLAN OF CARE
Problem: Adult Inpatient Plan of Care  Goal: Plan of Care Review  8/11/2024 1642 by Anjana Hobbs RN  Outcome: Progressing  8/11/2024 1118 by Anjana Hobbs RN  Outcome: Progressing  Goal: Patient-Specific Goal (Individualized)  8/11/2024 1642 by Anjana Hobbs RN  Outcome: Progressing  8/11/2024 1118 by Anjana Hobbs RN  Outcome: Progressing  Goal: Absence of Hospital-Acquired Illness or Injury  8/11/2024 1642 by Anjana Hobbs RN  Outcome: Progressing  8/11/2024 1118 by Anjana Hobbs RN  Outcome: Progressing  Goal: Optimal Comfort and Wellbeing  8/11/2024 1642 by Anjana Hobbs RN  Outcome: Progressing  8/11/2024 1118 by Anjana Hobbs RN  Outcome: Progressing  Goal: Readiness for Transition of Care  8/11/2024 1642 by Anjana Hobbs RN  Outcome: Progressing  8/11/2024 1118 by Anjana Hobbs RN  Outcome: Progressing     Problem: Fall Injury Risk  Goal: Absence of Fall and Fall-Related Injury  8/11/2024 1642 by Anjana Hobbs RN  Outcome: Progressing  8/11/2024 1118 by Anjana Hobbs RN  Outcome: Progressing     Problem: Skin Injury Risk Increased  Goal: Skin Health and Integrity  8/11/2024 1642 by Anjana Hobbs RN  Outcome: Progressing  8/11/2024 1118 by Anjana Hobbs RN  Outcome: Progressing

## 2024-08-12 LAB
ANION GAP SERPL CALCULATED.3IONS-SCNC: 9 MMOL/L (ref 7–16)
BASOPHILS # BLD AUTO: 0.04 K/UL (ref 0–0.2)
BASOPHILS NFR BLD AUTO: 0.5 % (ref 0–1)
BUN SERPL-MCNC: 9 MG/DL (ref 7–18)
BUN/CREAT SERPL: 13 (ref 6–20)
CALCIUM SERPL-MCNC: 8.5 MG/DL (ref 8.5–10.1)
CHLORIDE SERPL-SCNC: 104 MMOL/L (ref 98–107)
CO2 SERPL-SCNC: 30 MMOL/L (ref 21–32)
CREAT SERPL-MCNC: 0.69 MG/DL (ref 0.55–1.02)
DIFFERENTIAL METHOD BLD: ABNORMAL
EGFR (NO RACE VARIABLE) (RUSH/TITUS): 90 ML/MIN/1.73M2
EOSINOPHIL # BLD AUTO: 0.21 K/UL (ref 0–0.5)
EOSINOPHIL NFR BLD AUTO: 2.7 % (ref 1–4)
ERYTHROCYTE [DISTWIDTH] IN BLOOD BY AUTOMATED COUNT: 12.6 % (ref 11.5–14.5)
GLUCOSE SERPL-MCNC: 104 MG/DL (ref 74–106)
GLUCOSE SERPL-MCNC: 110 MG/DL (ref 70–105)
GLUCOSE SERPL-MCNC: 111 MG/DL (ref 70–105)
GLUCOSE SERPL-MCNC: 130 MG/DL (ref 70–105)
HCT VFR BLD AUTO: 33.8 % (ref 38–47)
HGB BLD-MCNC: 11.3 G/DL (ref 12–16)
IMM GRANULOCYTES # BLD AUTO: 0.02 K/UL (ref 0–0.04)
IMM GRANULOCYTES NFR BLD: 0.3 % (ref 0–0.4)
LYMPHOCYTES # BLD AUTO: 2.48 K/UL (ref 1–4.8)
LYMPHOCYTES NFR BLD AUTO: 32.5 % (ref 27–41)
MAGNESIUM SERPL-MCNC: 2.2 MG/DL (ref 1.7–2.3)
MCH RBC QN AUTO: 29 PG (ref 27–31)
MCHC RBC AUTO-ENTMCNC: 33.4 G/DL (ref 32–36)
MCV RBC AUTO: 86.9 FL (ref 80–96)
MONOCYTES # BLD AUTO: 0.57 K/UL (ref 0–0.8)
MONOCYTES NFR BLD AUTO: 7.5 % (ref 2–6)
MPC BLD CALC-MCNC: 9.2 FL (ref 9.4–12.4)
NEUTROPHILS # BLD AUTO: 4.32 K/UL (ref 1.8–7.7)
NEUTROPHILS NFR BLD AUTO: 56.5 % (ref 53–65)
NRBC # BLD AUTO: 0 X10E3/UL
NRBC, AUTO (.00): 0 %
PHOSPHATE SERPL-MCNC: 3.4 MG/DL (ref 2.5–4.5)
PLATELET # BLD AUTO: 262 K/UL (ref 150–400)
POTASSIUM SERPL-SCNC: 3.3 MMOL/L (ref 3.5–5.1)
RBC # BLD AUTO: 3.89 M/UL (ref 4.2–5.4)
SODIUM SERPL-SCNC: 140 MMOL/L (ref 136–145)
WBC # BLD AUTO: 7.64 K/UL (ref 4.5–11)

## 2024-08-12 PROCEDURE — 97116 GAIT TRAINING THERAPY: CPT

## 2024-08-12 PROCEDURE — 36415 COLL VENOUS BLD VENIPUNCTURE: CPT | Performed by: NURSE PRACTITIONER

## 2024-08-12 PROCEDURE — 30200315 PPD INTRADERMAL TEST REV CODE 302: Performed by: NURSE PRACTITIONER

## 2024-08-12 PROCEDURE — 85025 COMPLETE CBC W/AUTO DIFF WBC: CPT | Performed by: NURSE PRACTITIONER

## 2024-08-12 PROCEDURE — 27000982 HC MATTRESS, MATRIX LAL RENTAL

## 2024-08-12 PROCEDURE — 25000003 PHARM REV CODE 250: Performed by: NURSE PRACTITIONER

## 2024-08-12 PROCEDURE — 97535 SELF CARE MNGMENT TRAINING: CPT

## 2024-08-12 PROCEDURE — 80048 BASIC METABOLIC PNL TOTAL CA: CPT | Performed by: NURSE PRACTITIONER

## 2024-08-12 PROCEDURE — 86580 TB INTRADERMAL TEST: CPT | Performed by: NURSE PRACTITIONER

## 2024-08-12 PROCEDURE — 83735 ASSAY OF MAGNESIUM: CPT | Performed by: NURSE PRACTITIONER

## 2024-08-12 PROCEDURE — 99309 SBSQ NF CARE MODERATE MDM 30: CPT | Mod: ,,, | Performed by: NURSE PRACTITIONER

## 2024-08-12 PROCEDURE — 63600175 PHARM REV CODE 636 W HCPCS: Performed by: FAMILY MEDICINE

## 2024-08-12 PROCEDURE — 94761 N-INVAS EAR/PLS OXIMETRY MLT: CPT

## 2024-08-12 PROCEDURE — 27000944

## 2024-08-12 PROCEDURE — 97530 THERAPEUTIC ACTIVITIES: CPT | Mod: CQ

## 2024-08-12 PROCEDURE — 84100 ASSAY OF PHOSPHORUS: CPT | Performed by: NURSE PRACTITIONER

## 2024-08-12 PROCEDURE — 94640 AIRWAY INHALATION TREATMENT: CPT

## 2024-08-12 PROCEDURE — 11000004 HC SNF PRIVATE

## 2024-08-12 PROCEDURE — 25000242 PHARM REV CODE 250 ALT 637 W/ HCPCS: Performed by: NURSE PRACTITIONER

## 2024-08-12 PROCEDURE — 82962 GLUCOSE BLOOD TEST: CPT

## 2024-08-12 RX ORDER — POTASSIUM CHLORIDE 20 MEQ/1
40 TABLET, EXTENDED RELEASE ORAL ONCE
Status: COMPLETED | OUTPATIENT
Start: 2024-08-12 | End: 2024-08-12

## 2024-08-12 RX ADMIN — TUBERCULIN PURIFIED PROTEIN DERIVATIVE 5 UNITS: 5 INJECTION, SOLUTION INTRADERMAL at 05:08

## 2024-08-12 RX ADMIN — MEMANTINE 10 MG: 5 TABLET ORAL at 09:08

## 2024-08-12 RX ADMIN — MEGESTROL ACETATE 40 MG: 40 TABLET ORAL at 09:08

## 2024-08-12 RX ADMIN — CITALOPRAM HYDROBROMIDE 40 MG: 20 TABLET ORAL at 09:08

## 2024-08-12 RX ADMIN — ACETAMINOPHEN 650 MG: 325 TABLET ORAL at 10:08

## 2024-08-12 RX ADMIN — ENOXAPARIN SODIUM 40 MG: 40 INJECTION SUBCUTANEOUS at 04:08

## 2024-08-12 RX ADMIN — POTASSIUM CHLORIDE 40 MEQ: 1500 TABLET, EXTENDED RELEASE ORAL at 09:08

## 2024-08-12 RX ADMIN — TRAZODONE HYDROCHLORIDE 150 MG: 50 TABLET ORAL at 08:08

## 2024-08-12 RX ADMIN — IPRATROPIUM BROMIDE AND ALBUTEROL SULFATE 3 ML: 2.5; .5 SOLUTION RESPIRATORY (INHALATION) at 08:08

## 2024-08-12 RX ADMIN — LORAZEPAM 1 MG: 0.5 TABLET ORAL at 10:08

## 2024-08-12 RX ADMIN — DONEPEZIL HYDROCHLORIDE 10 MG: 5 TABLET ORAL at 08:08

## 2024-08-12 RX ADMIN — PANTOPRAZOLE SODIUM 40 MG: 40 TABLET, DELAYED RELEASE ORAL at 09:08

## 2024-08-12 RX ADMIN — MEMANTINE 10 MG: 5 TABLET ORAL at 08:08

## 2024-08-12 RX ADMIN — ATORVASTATIN CALCIUM 40 MG: 40 TABLET, FILM COATED ORAL at 09:08

## 2024-08-12 RX ADMIN — BUSPIRONE HYDROCHLORIDE 10 MG: 5 TABLET ORAL at 01:08

## 2024-08-12 RX ADMIN — IPRATROPIUM BROMIDE AND ALBUTEROL SULFATE 3 ML: 2.5; .5 SOLUTION RESPIRATORY (INHALATION) at 07:08

## 2024-08-12 RX ADMIN — FLUTICASONE PROPIONATE 50 MCG: 50 SPRAY, METERED NASAL at 09:08

## 2024-08-12 NOTE — PROGRESS NOTES
[facility]  Hospital Medicine  Progress Note    Patient Name: Jane Clark  MRN: [id number]  Patient Class: IP- Swing   Admission Date: 8/3/2024  Length of Stay: 9 days  Attending Physician: Chapito Plaza IV, DO  Primary Care Provider: Didi Singer NP        Subjective:     Principal Problem:Generalized weakness        HPI:  78 year old pleasantly demented  female presented to our facility today from [facility] for admission into swing bed for generalized weakness and treatment for UTI.  Pt has known hx of dementia, DM, HLD, HTN and CVA.  She went in to the ER at [facility] on 7/27/24 and was diagnosed with UTI, placed on Cefdinir.  She went to PCP clinic on 7/31/24 for decreased PO intake, refusal to take PO antibiotic and increased confusion.  They did lab which showed WBC 13 and BUN 27 for abnormals.  They contacted Dr Freire and had patient direct admitted to hospital services.  While admitted, patient received hydration and was started on Levaquin for UTI.  Due to her weakness, the decision was made to transfer her to our facility to complete her antibiotic therapy and work with PT/OT therapies for help improved strength/coordination.  Patient's daughter was here with her upon arrival, and she has chosen for patient to be DNR.  Pt remains pleasantly confused and is at baseline per family.      Overview/Hospital Course:  08/06/24 Awake and resting in bed. Denies SOB and pain. No distress noted. CNA reports Mrs. Man eating 50% of meals served. Walked 17 feet with therapist today. Continue therapy for strengthening. Continue levaquin.  08/09/24 Resting in bed. Awakens when called. Denies pain. Night shift reports her having fall last night. Had imaging of left hip and pelvis- shows mild osteoarthritis. Continue PT and OT for strengthening.  08/12/24 Awake and resting in bed. Continues to have confusion, tries to get out of bed. Requires frequent  redirection. Appetite is fair. Continue PT and OT for strengthening.     Interval History: weakness    Review of Systems   Unable to perform ROS: Dementia     Objective:     Vital Signs (Most Recent):  Temp: 97.5 °F (36.4 °C) (08/12/24 0730)  Pulse: 75 (08/12/24 0730)  Resp: 18 (08/12/24 0730)  BP: 127/84 (08/12/24 0730)  SpO2: 98 % (08/12/24 0730) Vital Signs (24h Range):  Temp:  [97.5 °F (36.4 °C)-98.4 °F (36.9 °C)] 97.5 °F (36.4 °C)  Pulse:  [] 75  Resp:  [18] 18  SpO2:  [96 %-98 %] 98 %  BP: (122-127)/(76-84) 127/84     Weight: 46.4 kg (102 lb 6.4 oz)  Body mass index is 17.04 kg/m².    Intake/Output Summary (Last 24 hours) at 8/12/2024 1110  Last data filed at 8/11/2024 2300  Gross per 24 hour   Intake 490 ml   Output --   Net 490 ml         Physical Exam  HENT:      Head: Normocephalic.   Cardiovascular:      Rate and Rhythm: Normal rate and regular rhythm.      Pulses: Normal pulses.      Heart sounds: Normal heart sounds.   Pulmonary:      Breath sounds: Normal breath sounds.   Abdominal:      General: Bowel sounds are normal. There is no distension.      Palpations: Abdomen is soft. There is no mass.      Tenderness: There is no abdominal tenderness.      Hernia: No hernia is present.   Musculoskeletal:         General: Normal range of motion.      Cervical back: Normal range of motion.   Skin:     General: Skin is warm and dry.   Neurological:      Mental Status: She is disoriented.             Significant Labs: All pertinent labs within the past 24 hours have been reviewed.  Recent Lab Results  (Last 5 results in the past 24 hours)        08/12/24  0633   08/12/24  0338   08/11/24  2217   08/11/24  1626   08/11/24  1130        Anion Gap   9             Baso #   0.04             Basophil %   0.5             BUN   9             BUN/CREAT RATIO   13             Calcium   8.5             Chloride   104             CO2   30             Creatinine   0.69             Differential Method   Auto              eGFR   90             Eos #   0.21             Eos %   2.7             Glucose   104             Hematocrit   33.8             Hemoglobin   11.3             Immature Grans (Abs)   0.02             Immature Granulocytes   0.3             Lymph #   2.48             Lymph %   32.5             Magnesium    2.2             MCH   29.0             MCHC   33.4             MCV   86.9             Mono #   0.57             Mono %   7.5             MPV   9.2             Neutrophils, Abs   4.32             Neutrophils Relative   56.5             nRBC   0.0             NUCLEATED RBC ABSOLUTE   0.00             Phosphorus Level   3.4             Platelet Count   262             POC Glucose 110     152   128   156       Potassium   3.3             RBC   3.89             RDW   12.6             Sodium   140             WBC   7.64                                    Significant Imaging: I have reviewed all pertinent imaging results/findings within the past 24 hours.    Assessment/Plan:      * Generalized weakness    PT/OT assessment    08/06/24 walked 17 feet with therapist      08/09/24 continue therapy       UTI (urinary tract infection)    Continue her Levaquin for 5 days      08/09 has completed therapy    Dementia with behavioral disturbance  Dementia is moderate. The patient does not have signs of behavioral disturbance. Home dementia medications are Held or Continued: continued.. Continue non-pharmacologic interventions to prevent delirium (No VS between 11PM-5AM, activity during day, opening blinds, providing glasses/hearing aids, and up in chair during daytime). Will avoid narcotics and benzos unless absolutely necessary.     Primary hypertension  Chronic, stable. Latest blood pressure and vitals reviewed-     Temp:  [97.7 °F (36.5 °C)-98.2 °F (36.8 °C)]   Pulse:  []   Resp:  [17-22]   BP: (120-148)/()   SpO2:  [94 %-98 %] .   Home meds for hypertension were reviewed and noted below.       While in the hospital,  will manage blood pressure as follows; Continue home antihypertensive regimen      Mixed hyperlipidemia  Continue atorvastatin        VTE Risk Mitigation (From admission, onward)           Ordered     enoxaparin injection 40 mg  Every 24 hours         08/05/24 1104     IP VTE HIGH RISK PATIENT  Once         08/03/24 1320     Place sequential compression device  Until discontinued         08/03/24 1320                    Discharge Planning   JUDE: 8/22/2024     Code Status: DNR   Is the patient medically ready for discharge?:     Reason for patient still in hospital (select all that apply): Treatment  Discharge Plan A: Skilled Nursing Facility                  MASON Bartholomew  Department of Hospital Medicine   [facility]

## 2024-08-12 NOTE — PT/OT/SLP PROGRESS
Physical Therapy Treatment    Patient Name:  Jane Clark   MRN:  [id number]    Recommendations:     Discharge Recommendations: Low Intensity Therapy  Discharge Equipment Recommendations: none  Barriers to discharge: None    Assessment:     Jane Clark is a 78 y.o. female admitted with a medical diagnosis of Generalized weakness.  She presents with the following impairments/functional limitations: weakness, impaired self care skills, impaired functional mobility, gait instability, impaired balance, impaired cognition, decreased lower extremity function, decreased safety awareness Patient found in nursing station upon Therapist arrival due to patient being unsafe when unattended in room. Nursing wanted to attempt to return patient back to bed to try to let her sleep, since she has been in the nursing station majority of the night/morning. Patient ambulated 5 feet back to bed with hand held assist and able to transfer from sit to supine with moderate assist due to patient having difficulty following directions. Patient was then left with nurse Geeta Vegas.    Rehab Prognosis: Fair; patient would benefit from acute skilled PT services to address these deficits and reach maximum level of function.    Recent Surgery: * No surgery found *      Plan:     During this hospitalization, patient to be seen 5 x/week to address the identified rehab impairments via gait training, therapeutic activities, therapeutic exercises, neuromuscular re-education and progress toward the following goals:    Plan of Care Expires:  08/22/24    Subjective     Chief Complaint: fear of being left alone  Patient/Family Comments/goals: family considering home on hospice  Pain/Comfort:  Pain Rating 1: 0/10      Objective:     Communicated with ANTONIO Vegas prior to session.  Patient found  up in chair in nursing station  with   upon PT entry.     General Precautions: Standard, fall  Orthopedic Precautions: N/A  Braces: N/A  Respiratory Status:  Room air     Functional Mobility:  Bed Mobility:     Sit to Supine: moderate assistance  Transfers:     Sit to Stand:  contact guard assistance with hand-held assist  Gait: Patient ambulated 5 feet from chair to bed in extended step transfer. Patient required hand held assist at CGA      AM-PAC 6 CLICK MOBILITY  Turning over in bed (including adjusting bedclothes, sheets and blankets)?: 3  Sitting down on and standing up from a chair with arms (e.g., wheelchair, bedside commode, etc.): 3  Moving from lying on back to sitting on the side of the bed?: 4  Moving to and from a bed to a chair (including a wheelchair)?: 3  Need to walk in hospital room?: 3  Climbing 3-5 steps with a railing?: 3  Basic Mobility Total Score: 19       Treatment & Education:  Patient performed bed mobility, transfers, and gait as listed above. Reasoning for shorter treatment above (see under assessment). Therapist to attempt more treatment after lunch.    Patient left supine with call button in reach, bed alarm on, and RN Geeta Vegas present..    GOALS:   Multidisciplinary Problems       Physical Therapy Goals          Problem: Physical Therapy    Goal Priority Disciplines Outcome Goal Variances Interventions   Physical Therapy Goal     PT, PT/OT Progressing     Description: Short-term Goals: 1.5 weeks  1. Patient will perform supine to/from sit with moderate assistance to improve independence and safety with bed mobility.  2. Patient will perform sit to/from stand with a rolling walker with moderate assistance to improve independence and safety with transfers.  3. Patient will ambulate 15 feet with a rolling walker with moderate assistance to improve independence and safety with gait.  4. Patient will tolerate 15 minutes of physical therapy intervention to improve endurance and activity tolerance.    Long-term goals: 3 weeks  1. Patient will perform supine to/from sit with contact guard assist to improve independence and safety with bed  mobility.  2. Patient will perform sit to/from stand with a rolling walker with contact guard assist to improve independence and safety with transfers.  3. Patient will ambulate 50 feet with a rolling walker with contact guard assist to improve independence and safety with gait.  4. Patient will tolerate 30 minutes of physical therapy intervention to improve endurance and activity tolerance.                        Time Tracking:     PT Received On: 08/12/24  PT Start Time: 1117     PT Stop Time: 1127  PT Total Time (min): 10 min     Billable Minutes: Therapeutic Activity 10       PT/PTA: PTA     Number of PTA visits since last PT visit: 4     08/12/2024

## 2024-08-12 NOTE — SUBJECTIVE & OBJECTIVE
Interval History: weakness    Review of Systems   Unable to perform ROS: Dementia     Objective:     Vital Signs (Most Recent):  Temp: 97.5 °F (36.4 °C) (08/12/24 0730)  Pulse: 75 (08/12/24 0730)  Resp: 18 (08/12/24 0730)  BP: 127/84 (08/12/24 0730)  SpO2: 98 % (08/12/24 0730) Vital Signs (24h Range):  Temp:  [97.5 °F (36.4 °C)-98.4 °F (36.9 °C)] 97.5 °F (36.4 °C)  Pulse:  [] 75  Resp:  [18] 18  SpO2:  [96 %-98 %] 98 %  BP: (122-127)/(76-84) 127/84     Weight: 46.4 kg (102 lb 6.4 oz)  Body mass index is 17.04 kg/m².    Intake/Output Summary (Last 24 hours) at 8/12/2024 1110  Last data filed at 8/11/2024 2300  Gross per 24 hour   Intake 490 ml   Output --   Net 490 ml         Physical Exam  HENT:      Head: Normocephalic.   Cardiovascular:      Rate and Rhythm: Normal rate and regular rhythm.      Pulses: Normal pulses.      Heart sounds: Normal heart sounds.   Pulmonary:      Breath sounds: Normal breath sounds.   Abdominal:      General: Bowel sounds are normal. There is no distension.      Palpations: Abdomen is soft. There is no mass.      Tenderness: There is no abdominal tenderness.      Hernia: No hernia is present.   Musculoskeletal:         General: Normal range of motion.      Cervical back: Normal range of motion.   Skin:     General: Skin is warm and dry.   Neurological:      Mental Status: She is disoriented.             Significant Labs: All pertinent labs within the past 24 hours have been reviewed.  Recent Lab Results  (Last 5 results in the past 24 hours)        08/12/24  0633   08/12/24  0338   08/11/24  2217   08/11/24  1626   08/11/24  1130        Anion Gap   9             Baso #   0.04             Basophil %   0.5             BUN   9             BUN/CREAT RATIO   13             Calcium   8.5             Chloride   104             CO2   30             Creatinine   0.69             Differential Method   Auto             eGFR   90             Eos #   0.21             Eos %   2.7              Glucose   104             Hematocrit   33.8             Hemoglobin   11.3             Immature Grans (Abs)   0.02             Immature Granulocytes   0.3             Lymph #   2.48             Lymph %   32.5             Magnesium    2.2             MCH   29.0             MCHC   33.4             MCV   86.9             Mono #   0.57             Mono %   7.5             MPV   9.2             Neutrophils, Abs   4.32             Neutrophils Relative   56.5             nRBC   0.0             NUCLEATED RBC ABSOLUTE   0.00             Phosphorus Level   3.4             Platelet Count   262             POC Glucose 110     152   128   156       Potassium   3.3             RBC   3.89             RDW   12.6             Sodium   140             WBC   7.64                                    Significant Imaging: I have reviewed all pertinent imaging results/findings within the past 24 hours.

## 2024-08-12 NOTE — NURSING
Patient placed in nursing station at different times through weekend shifts r/t repeated attempts to get out of bed and continued safety of patient.

## 2024-08-12 NOTE — PLAN OF CARE
[facility] - Medical Surgical Unit  Discharge Assessment    Primary Care Provider: Didi Singer NP     Discharge Assessment (most recent)       BRIEF DISCHARGE ASSESSMENT - 08/12/24 1124          Discharge Planning    Assessment Type Discharge Planning Brief Assessment (P)      Discharge Plan A Skilled Nursing Facility (P)                    Spoke with son, Michael, he has request referral to [facility].

## 2024-08-12 NOTE — NURSING
Patient made several attempts to get out of bed. Attempts to redirect unsuccessful. Patient moved to nursing station via recliner for the majority of the day.

## 2024-08-12 NOTE — PT/OT/SLP PROGRESS
Occupational Therapy   Treatment    Name: Jane Clark  MRN: [id number]  Admitting Diagnosis:  Generalized weakness       Recommendations:     Discharge Recommendations: Low Intensity Therapy  Discharge Equipment Recommendations:     Barriers to discharge:       Assessment:     Jane Clark is a 78 y.o. female with a medical diagnosis of Generalized weakness.  She presents with weakness and decline with ADLs. Performance deficits affecting function are  .     Rehab Prognosis:  Good; patient would benefit from acute skilled OT services to address these deficits and reach maximum level of function.       Plan:     Patient to be seen 5 x/week to address the above listed problems via self-care/home management, therapeutic activities, therapeutic exercises  Plan of Care Expires:    Plan of Care Reviewed with: patient    Subjective     Chief Complaint: Pain and weakness  Patient/Family Comments/goals: to return to prior level of function  Pain/Comfort:       Objective:     Communicated with: Nurse prior to session.  Patient found up in chair with   upon OT entry to room.    General Precautions: Standard, fall, pureed diet    Orthopedic Precautions:   Braces: N/A  Respiratory Status: Room air     Occupational Performance:     Bed Mobility:    Patient completed Rolling/Turning to Left with  contact guard assistance  Patient completed Rolling/Turning to Right with contact guard assistance  Patient completed Scooting/Bridging with minimum assistance  Patient completed Supine to Sit with minimum assistance  Patient completed Sit to Supine with minimum assistance     Functional Mobility/Transfers:  Patient completed Sit <> Stand Transfer with contact guard assistance  with  rolling walker   Patient completed Bed <> Chair Transfer using Step Transfer technique with minimum assistance with rolling walker  Patient completed Toilet Transfer Step Transfer technique with minimum assistance with  rolling walker  Functional  Mobility: Patient ambulated within room with minimal difficulty    Activities of Daily Living:  Grooming: contact guard assistance to stand at sink and perform hand washing. Patient required moderate assistance to perform hand washing and facial hygiene due to confusion. Patient was able to stand at the sink and perform ADL with no unsafe fatigue.   Lower Body Dressing: maximal assistance to tressa socks       AMPAC 6 Click ADL:      Treatment & Education:  Pt educated on OT role/POC.   Importance of OOB activity with staff assistance.  Importance of sitting up in the chair throughout the day as tolerated, especially for meals   Safety during functional t/f and mobility  Importance of assisting with self-care activities      Patient left sitting edge of bed with all lines intact, call button in reach, and Physical therapy  present    GOALS:   Multidisciplinary Problems       Occupational Therapy Goals          Problem: Occupational Therapy    Goal Priority Disciplines Outcome Interventions   Occupational Therapy Goal     OT, PT/OT Progressing    Description: STG:  Pt will perform self feeding with SBA using adaptive equipment as indicated.   Pt will perform grooming with Mod A  Pt will perform UE dressing with Mod a  Pt will sit EOB x 15 min with CGA assistance  Pt will transfer bed/chair/bsc with Min A  Pt will perform standing task x 5 min with CGA assistance  Pt will tolerate 20 minutes of tx without fatigue      LT.Restore to max I with self care and mobility.                         Time Tracking:     OT Date of Treatment: 24  OT Start Time: 1326  OT Stop Time: 1341  OT Total Time (min): 15 min    Billable Minutes:Self Care/Home Management 15 min             JOANN Arias/BLACK, CSRS  2024

## 2024-08-13 VITALS
OXYGEN SATURATION: 97 % | WEIGHT: 102.38 LBS | DIASTOLIC BLOOD PRESSURE: 68 MMHG | BODY MASS INDEX: 17.06 KG/M2 | HEIGHT: 65 IN | RESPIRATION RATE: 18 BRPM | HEART RATE: 88 BPM | TEMPERATURE: 98 F | SYSTOLIC BLOOD PRESSURE: 118 MMHG

## 2024-08-13 PROBLEM — K59.00 CONSTIPATION: Status: RESOLVED | Noted: 2024-07-31 | Resolved: 2024-08-13

## 2024-08-13 LAB
ANION GAP SERPL CALCULATED.3IONS-SCNC: 8 MMOL/L (ref 7–16)
BUN SERPL-MCNC: 9 MG/DL (ref 7–18)
BUN/CREAT SERPL: 11 (ref 6–20)
CALCIUM SERPL-MCNC: 8.4 MG/DL (ref 8.5–10.1)
CHLORIDE SERPL-SCNC: 106 MMOL/L (ref 98–107)
CO2 SERPL-SCNC: 31 MMOL/L (ref 21–32)
CREAT SERPL-MCNC: 0.82 MG/DL (ref 0.55–1.02)
EGFR (NO RACE VARIABLE) (RUSH/TITUS): 74 ML/MIN/1.73M2
GLUCOSE SERPL-MCNC: 100 MG/DL (ref 70–105)
GLUCOSE SERPL-MCNC: 104 MG/DL (ref 70–105)
GLUCOSE SERPL-MCNC: 99 MG/DL (ref 74–106)
POTASSIUM SERPL-SCNC: 4 MMOL/L (ref 3.5–5.1)
SODIUM SERPL-SCNC: 141 MMOL/L (ref 136–145)

## 2024-08-13 PROCEDURE — 25000242 PHARM REV CODE 250 ALT 637 W/ HCPCS: Performed by: NURSE PRACTITIONER

## 2024-08-13 PROCEDURE — 82962 GLUCOSE BLOOD TEST: CPT

## 2024-08-13 PROCEDURE — 94761 N-INVAS EAR/PLS OXIMETRY MLT: CPT

## 2024-08-13 PROCEDURE — 27000982 HC MATTRESS, MATRIX LAL RENTAL

## 2024-08-13 PROCEDURE — 25000003 PHARM REV CODE 250: Performed by: NURSE PRACTITIONER

## 2024-08-13 PROCEDURE — 99316 NF DSCHRG MGMT 30 MIN+: CPT | Mod: ,,, | Performed by: FAMILY MEDICINE

## 2024-08-13 PROCEDURE — 80048 BASIC METABOLIC PNL TOTAL CA: CPT | Performed by: NURSE PRACTITIONER

## 2024-08-13 PROCEDURE — 36415 COLL VENOUS BLD VENIPUNCTURE: CPT | Performed by: NURSE PRACTITIONER

## 2024-08-13 PROCEDURE — 97116 GAIT TRAINING THERAPY: CPT

## 2024-08-13 PROCEDURE — 94640 AIRWAY INHALATION TREATMENT: CPT

## 2024-08-13 PROCEDURE — 27000944

## 2024-08-13 RX ORDER — POLYETHYLENE GLYCOL 3350 17 G/17G
34 POWDER, FOR SOLUTION ORAL DAILY
Start: 2024-08-13

## 2024-08-13 RX ORDER — ACETAMINOPHEN 325 MG/1
650 TABLET ORAL EVERY 6 HOURS PRN
Start: 2024-08-13

## 2024-08-13 RX ADMIN — FLUTICASONE PROPIONATE 50 MCG: 50 SPRAY, METERED NASAL at 10:08

## 2024-08-13 RX ADMIN — MEMANTINE 10 MG: 5 TABLET ORAL at 08:08

## 2024-08-13 RX ADMIN — ATORVASTATIN CALCIUM 40 MG: 40 TABLET, FILM COATED ORAL at 08:08

## 2024-08-13 RX ADMIN — CITALOPRAM HYDROBROMIDE 40 MG: 20 TABLET ORAL at 08:08

## 2024-08-13 RX ADMIN — BUSPIRONE HYDROCHLORIDE 10 MG: 5 TABLET ORAL at 12:08

## 2024-08-13 RX ADMIN — IPRATROPIUM BROMIDE AND ALBUTEROL SULFATE 3 ML: 2.5; .5 SOLUTION RESPIRATORY (INHALATION) at 07:08

## 2024-08-13 RX ADMIN — MEGESTROL ACETATE 40 MG: 40 TABLET ORAL at 08:08

## 2024-08-13 RX ADMIN — PANTOPRAZOLE SODIUM 40 MG: 40 TABLET, DELAYED RELEASE ORAL at 08:08

## 2024-08-13 NOTE — DISCHARGE INSTRUCTIONS
Take all your medicine as prescribed    Follow up with your PCP on August 15 th at 8:00    Activity as tolerated    Diet: Dysphagia/Pureed    Fall Precaution    PT/OT to follow up with patient for strengthening     Accu check ac&hs

## 2024-08-13 NOTE — PLAN OF CARE
Problem: Adult Inpatient Plan of Care  Goal: Plan of Care Review  8/13/2024 1241 by Yeny Hu RN  Outcome: Met  8/13/2024 0827 by Yeny Hu RN  Outcome: Progressing  Goal: Patient-Specific Goal (Individualized)  8/13/2024 1241 by Yeny Hu RN  Outcome: Met  8/13/2024 0827 by Yeny Hu RN  Outcome: Progressing  Goal: Absence of Hospital-Acquired Illness or Injury  8/13/2024 1241 by Yeny Hu RN  Outcome: Met  8/13/2024 0827 by Yeny Hu RN  Outcome: Progressing  Goal: Optimal Comfort and Wellbeing  8/13/2024 1241 by Yeny Hu RN  Outcome: Met  8/13/2024 0827 by Yeny Hu RN  Outcome: Progressing  Goal: Readiness for Transition of Care  8/13/2024 1241 by Yeny Hu RN  Outcome: Met  8/13/2024 0827 by Yeny Hu RN  Outcome: Progressing     Problem: Fall Injury Risk  Goal: Absence of Fall and Fall-Related Injury  8/13/2024 1241 by Yeny Hu RN  Outcome: Met  8/13/2024 0827 by Yeny Hu RN  Outcome: Progressing     Problem: Skin Injury Risk Increased  Goal: Skin Health and Integrity  8/13/2024 1241 by Yeny Hu RN  Outcome: Met  8/13/2024 0827 by Yeny Hu RN  Outcome: Progressing

## 2024-08-13 NOTE — NURSING
Ginette staff member at [facility] arrived to facility to transport patient back to Nursing home. Pt transported via wheelchair to transport van at this time

## 2024-08-13 NOTE — PLAN OF CARE
[facility] - [facility] Surgical Unit  Discharge Final Note    Primary Care Provider: Didi Singer NP    Expected Discharge Date: 8/13/2024    Final Discharge Note (most recent)       Final Note - 08/13/24 1040          Final Note    Assessment Type Final Discharge Note (P)      Anticipated Discharge Disposition Skilled Nursing Facility (P)      Hospital Resources/Appts/Education Provided Provided patient/caregiver with written discharge plan information (P)         Post-Acute Status    Post-Acute Authorization Placement (P)      Post-Acute Placement Status Set-up Complete/Auth obtained (P)      Coverage Medicare (P)      Patient choice form signed by patient/caregiver List with quality metrics by geographic area provided (P)      Discharge Delays None known at this time (P)                      Important Message from Medicare  Important Message from Medicare regarding Discharge Appeal Rights: Given to patient/caregiver, Explained to patient/caregiver, Signed/date by patient/caregiver (Telephone consent Michael Clark)     Date IMM was signed: 08/12/24  Time IMM was signed: 1404    Contact Info       Didi Singer NP   Specialty: Family Medicine   Relationship: PCP - General    [facility] [address] [address]   Phone: [phone]       Next Steps: Schedule an appointment as soon as possible for a visit in 3 day(s)          Ms. Clark will discharge to [facility] today.

## 2024-08-13 NOTE — DISCHARGE SUMMARY
[facility]  Hospital Medicine  Discharge Summary      Patient Name: Jane Clark  MRN: [id number]  [facility]: [id number]  Patient Class: IP- Swing  Admission Date: 8/3/2024  Hospital Length of Stay: 10 days  Discharge Date and Time:  08/13/2024 7:59 AM  Attending Physician: Chapito Plaza IV, DO   Discharging Provider: MASON Bartholomew  Primary Care Provider: Didi Singer NP    Primary Care Team: Networked reference to record PCT     HPI:   78 year old pleasantly demented  female presented to our facility today from [facility] for admission into swing bed for generalized weakness and treatment for UTI.  Pt has known hx of dementia, DM, HLD, HTN and CVA.  She went in to the ER at [facility] on 7/27/24 and was diagnosed with UTI, placed on Cefdinir.  She went to PCP clinic on 7/31/24 for decreased PO intake, refusal to take PO antibiotic and increased confusion.  They did lab which showed WBC 13 and BUN 27 for abnormals.  They contacted Dr Freire and had patient direct admitted to hospital services.  While admitted, patient received hydration and was started on Levaquin for UTI.  Due to her weakness, the decision was made to transfer her to our facility to complete her antibiotic therapy and work with PT/OT therapies for help improved strength/coordination.  Patient's daughter was here with her upon arrival, and she has chosen for patient to be DNR.  Pt remains pleasantly confused and is at baseline per family.      * No surgery found *      Hospital Course:   08/06/24 Awake and resting in bed. Denies SOB and pain. No distress noted. CNA reports Mrs. Man eating 50% of meals served. Walked 17 feet with therapist today. Continue therapy for strengthening. Continue levaquin.  08/09/24 Resting in bed. Awakens when called. Denies pain. Night shift reports her having fall last night. Had imaging of left hip and pelvis- shows mild osteoarthritis. Continue PT and  OT for strengthening.  08/12/24 Awake and resting in bed. Continues to have confusion, tries to get out of bed. Requires frequent redirection. Appetite is fair. Continue PT and OT for strengthening.   08/13/24 Mrs. Man has been accepted to [facility]. She will discharge today. Continue PT and OT. Fall precautions. Meds reconciled. Follow up appts made.  At this time, patient has reached maximal benefit from hospitalization at our facility and will be discharged in stable condition.    Goals of Care Treatment Preferences:  Code Status: DNR      SDOH Screening:  The patient was screened for utility difficulties, food insecurity, transport difficulties, housing insecurity, and interpersonal safety and there were no concerns identified this admission.     Consults:   Consults (From admission, onward)          Status Ordering Provider     Inpatient consult to Social Work  Once        Provider:  (Not yet assigned)    Acknowledged FREEMAN MIR     Inpatient consult to Registered Dietitian/Nutritionist  Once        Provider:  (Not yet assigned)    Completed KATE BECERRA            Neuro  Dementia with behavioral disturbance  Dementia is moderate. The patient does not have signs of behavioral disturbance. Home dementia medications are Held or Continued: continued.. Continue non-pharmacologic interventions to prevent delirium (No VS between 11PM-5AM, activity during day, opening blinds, providing glasses/hearing aids, and up in chair during daytime). Will avoid narcotics and benzos unless absolutely necessary.       08/13/24 continue namenda and aricept    Cardiac/Vascular  Primary hypertension  Chronic, stable. Latest blood pressure and vitals reviewed-     Temp:  [98.3 °F (36.8 °C)]   Pulse:  [75-88]   Resp:  [16-20]   BP: (115)/(75)   SpO2:  [95 %-98 %] .         08/13/24 bp stable    Endocrine  Severe protein-calorie malnutrition  Nutrition consulted. Most recent weight and BMI monitored-      Measurements:  Wt Readings from Last 1 Encounters:   08/07/24 46.4 kg (102 lb 6.4 oz)   Body mass index is 17.04 kg/m².    Patient has been screened and assessed by RD.    Malnutrition Type:  Context: chronic illness  Level: severe    Malnutrition Characteristic Summary:  Weight Loss (Malnutrition): greater than 10% in 6 months  Energy Intake (Malnutrition): less than or equal to 75% for greater than or equal to 1 month    Interventions/Recommendations (treatment strategy):       08/13/24 continue megace      Other  * Generalized weakness    PT/OT assessment    08/06/24 walked 17 feet with therapist      08/09/24 continue therapy     08/13/24 has been accepted to [facility]  Continue PT and OT   Fall precautions       Final Active Diagnoses:    Diagnosis Date Noted POA    PRINCIPAL PROBLEM:  Generalized weakness [R53.1] 08/03/2024 Yes    Dementia with behavioral disturbance [F03.918] 07/31/2024 Yes    UTI (urinary tract infection) [N39.0] 07/31/2024 Yes    Severe protein-calorie malnutrition [E43] 07/31/2024 Yes    Mixed hyperlipidemia [E78.2] 03/02/2022 Yes    Primary hypertension [I10] 03/02/2022 Yes      Problems Resolved During this Admission:    Diagnosis Date Noted Date Resolved POA    Constipation [K59.00] 07/31/2024 08/13/2024 Yes       Discharged Condition: stable    Disposition: Skilled Nursing Facility    Follow Up:   Follow-up Information       Didi Singer NP. Schedule an appointment as soon as possible for a visit in 3 day(s).    Specialty: Family Medicine  Contact information:  [address] [address]  [phone]                           Patient Instructions:      Diet Dysphagia Pureed       Significant Diagnostic Studies: Labs: All labs within the past 24 hours have been reviewed    Pending Diagnostic Studies:       Procedure Component Value Units Date/Time    Basic Metabolic Panel [[id number]]     Order Status: Sent Lab Status: No result     Specimen: Blood             Medications:  Reconciled Home Medications:      Medication List        START taking these medications      acetaminophen 325 MG tablet  Commonly known as: TYLENOL  Take 2 tablets (650 mg total) by mouth every 6 (six) hours as needed for Pain or Temperature greater than (100.4).            CHANGE how you take these medications      polyethylene glycol 17 gram Pwpk  Commonly known as: GLYCOLAX  Take 34 g by mouth once daily.  What changed: when to take this            CONTINUE taking these medications      albuterol-ipratropium 2.5 mg-0.5 mg/3 mL nebulizer solution  Commonly known as: DUO-NEB  Take 3 mLs by nebulization every 6 (six) hours as needed for Wheezing. Rescue     atorvastatin 40 MG tablet  Commonly known as: LIPITOR  Take 1 tablet (40 mg total) by mouth once daily.     busPIRone 5 MG Tab  Commonly known as: BUSPAR  Take 2 tablets daily at noon     citalopram 40 MG tablet  Commonly known as: CeleXA  TAKE 1 TABLET ONCE DAILY     donepeziL 10 MG tablet  Commonly known as: ARICEPT  TAKE 1 TABLET EVERY EVENING     fluticasone propionate 50 mcg/actuation nasal spray  Commonly known as: FLONASE  1 spray (50 mcg total) by Each Nostril route once daily.     megestroL 40 MG Tab  Commonly known as: MEGACE  Take 1 tablet (40 mg total) by mouth once daily.     memantine 10 MG Tab  Commonly known as: NAMENDA  TAKE 1 TABLET TWICE A DAY     pantoprazole 40 MG tablet  Commonly known as: PROTONIX  Take 1 tablet (40 mg total) by mouth once daily.     traMADoL 50 mg tablet  Commonly known as: ULTRAM  Take 1 tablet (50 mg total) by mouth every 6 (six) hours as needed for Pain.     traZODone 50 MG tablet  Commonly known as: DESYREL  TAKE 3 TABLETS (150MG      TOTAL) EVERY EVENING            STOP taking these medications      acetylcysteine 200 mg/ml (20%) 200 mg/mL (20 %) nebulizer solution  Commonly known as: MUCOMYST     levoFLOXacin 750 MG tablet  Commonly known as: LEVAQUIN              Indwelling Lines/Drains at time of  discharge:   Lines/Drains/Airways       None                   Time spent on the discharge of patient: 35 minutes         Chapito Plaza IV, DO  Department of Hospital Medicine  [facility]

## 2024-08-13 NOTE — ASSESSMENT & PLAN NOTE
Dementia is moderate. The patient does not have signs of behavioral disturbance. Home dementia medications are Held or Continued: continued.. Continue non-pharmacologic interventions to prevent delirium (No VS between 11PM-5AM, activity during day, opening blinds, providing glasses/hearing aids, and up in chair during daytime). Will avoid narcotics and benzos unless absolutely necessary.       08/13/24 continue namenda and aricept

## 2024-08-13 NOTE — NURSING
Report called to [facility] in [facility] at this time. Awaiting transportation from facility to transport back to Nursing Home

## 2024-08-13 NOTE — PT/OT/SLP PROGRESS
Physical Therapy Treatment    Patient Name:  Jane Clark   MRN:  [id number]    Recommendations:     Discharge Recommendations: Low Intensity Therapy  Discharge Equipment Recommendations: none  Barriers to discharge: Decreased caregiver support    Assessment:     Jane Clark is a 78 y.o. female admitted with a medical diagnosis of Generalized weakness.  She presents with the following impairments/functional limitations: weakness, impaired self care skills, impaired functional mobility, gait instability, impaired balance, impaired cognition, decreased lower extremity function, decreased safety awareness Patient agreeable to physical therapy today. Patient received a split treatment due to treatment earlier being cut short. She ambulated 2 x 30 feet with minimum assistance with moderate tactile and verbal cues to stay focused and correctly use assistive device. Patient's confusion worse today than normal with first treatment being cut short.     Rehab Prognosis: Good; patient would benefit from acute skilled PT services to address these deficits and reach maximum level of function.    Recent Surgery: * No surgery found *      Plan:     During this hospitalization, patient to be seen 5 x/week to address the identified rehab impairments via gait training, therapeutic activities, therapeutic exercises, neuromuscular re-education and progress toward the following goals:    Plan of Care Expires:  08/22/24    Subjective     Chief Complaint: weakness, confusion  Patient/Family Comments/goals:   Pain/Comfort:  Pain Rating 1: 0/10      Objective:     Communicated with OT prior to session.  Patient found sitting edge of bed with bed alarm upon PT entry to room.     General Precautions: Standard, fall  Orthopedic Precautions: N/A  Braces: N/A  Respiratory Status: Room air     Functional Mobility:  Gait: Pt ambulated 2 x 30 feet with rolling walker and minimum assistance.      AM-PAC 6 CLICK MOBILITY  Turning over in bed  (including adjusting bedclothes, sheets and blankets)?: 3  Sitting down on and standing up from a chair with arms (e.g., wheelchair, bedside commode, etc.): 3  Moving from lying on back to sitting on the side of the bed?: 4  Moving to and from a bed to a chair (including a wheelchair)?: 3  Need to walk in hospital room?: 3  Climbing 3-5 steps with a railing?: 3  Basic Mobility Total Score: 19       Treatment & Education:  Gait training as above.     Patient left HOB elevated with call button in reach and bed alarm on..    GOALS:   Multidisciplinary Problems       Physical Therapy Goals          Problem: Physical Therapy    Goal Priority Disciplines Outcome Goal Variances Interventions   Physical Therapy Goal     PT, PT/OT Progressing     Description: Short-term Goals: 1.5 weeks  1. Patient will perform supine to/from sit with moderate assistance to improve independence and safety with bed mobility.  2. Patient will perform sit to/from stand with a rolling walker with moderate assistance to improve independence and safety with transfers.  3. Patient will ambulate 15 feet with a rolling walker with moderate assistance to improve independence and safety with gait.  4. Patient will tolerate 15 minutes of physical therapy intervention to improve endurance and activity tolerance.    Long-term goals: 3 weeks  1. Patient will perform supine to/from sit with contact guard assist to improve independence and safety with bed mobility.  2. Patient will perform sit to/from stand with a rolling walker with contact guard assist to improve independence and safety with transfers.  3. Patient will ambulate 50 feet with a rolling walker with contact guard assist to improve independence and safety with gait.  4. Patient will tolerate 30 minutes of physical therapy intervention to improve endurance and activity tolerance.                        Time Tracking:     PT Received On: 08/12/24  PT Start Time: 1342     PT Stop Time: 1355  PT  Total Time (min): 13 min     Billable Minutes: Gait Training 13    Treatment Type: Split Treatment  PT/PTA: PT     Number of PTA visits since last PT visit: 0     08/13/2024

## 2024-08-13 NOTE — ASSESSMENT & PLAN NOTE
PT/OT assessment    08/06/24 walked 17 feet with therapist      08/09/24 continue therapy     08/13/24 has been accepted to [facility]  Continue PT and OT   Fall precautions

## 2024-08-13 NOTE — ASSESSMENT & PLAN NOTE
Nutrition consulted. Most recent weight and BMI monitored-     Measurements:  Wt Readings from Last 1 Encounters:   08/07/24 46.4 kg (102 lb 6.4 oz)   Body mass index is 17.04 kg/m².    Patient has been screened and assessed by RD.    Malnutrition Type:  Context: chronic illness  Level: severe    Malnutrition Characteristic Summary:  Weight Loss (Malnutrition): greater than 10% in 6 months  Energy Intake (Malnutrition): less than or equal to 75% for greater than or equal to 1 month    Interventions/Recommendations (treatment strategy):       08/13/24 continue megace

## 2024-08-13 NOTE — ASSESSMENT & PLAN NOTE
Chronic, stable. Latest blood pressure and vitals reviewed-     Temp:  [98.3 °F (36.8 °C)]   Pulse:  [75-88]   Resp:  [16-20]   BP: (115)/(75)   SpO2:  [95 %-98 %] .         08/13/24 bp stable

## 2024-09-28 ENCOUNTER — HOSPITAL ENCOUNTER (EMERGENCY)
Facility: HOSPITAL | Age: 79
Discharge: NURSING, REHAB OR LONG-TERM CARE | End: 2024-09-29
Payer: MEDICARE

## 2024-09-28 VITALS
DIASTOLIC BLOOD PRESSURE: 79 MMHG | HEART RATE: 94 BPM | BODY MASS INDEX: 17.16 KG/M2 | SYSTOLIC BLOOD PRESSURE: 149 MMHG | WEIGHT: 103 LBS | OXYGEN SATURATION: 98 % | HEIGHT: 65 IN | RESPIRATION RATE: 20 BRPM | TEMPERATURE: 98 F

## 2024-09-28 DIAGNOSIS — S06.5XAA SUBDURAL HEMATOMA: Primary | ICD-10-CM

## 2024-09-28 LAB
ALBUMIN SERPL BCP-MCNC: 3.5 G/DL (ref 3.5–5)
ALBUMIN/GLOB SERPL: 1.1 {RATIO}
ALP SERPL-CCNC: 184 U/L (ref 55–142)
ALT SERPL W P-5'-P-CCNC: 22 U/L (ref 13–56)
ANION GAP SERPL CALCULATED.3IONS-SCNC: 9 MMOL/L (ref 7–16)
AST SERPL W P-5'-P-CCNC: 25 U/L (ref 15–37)
BASOPHILS # BLD AUTO: 0.05 K/UL (ref 0–0.2)
BASOPHILS NFR BLD AUTO: 0.7 % (ref 0–1)
BILIRUB SERPL-MCNC: 0.3 MG/DL (ref ?–1.2)
BILIRUB UR QL STRIP: NEGATIVE
BUN SERPL-MCNC: 14 MG/DL (ref 7–18)
BUN/CREAT SERPL: 17 (ref 6–20)
CALCIUM SERPL-MCNC: 8.4 MG/DL (ref 8.5–10.1)
CHLORIDE SERPL-SCNC: 106 MMOL/L (ref 98–107)
CLARITY UR: CLEAR
CO2 SERPL-SCNC: 29 MMOL/L (ref 21–32)
COLOR UR: NORMAL
CREAT SERPL-MCNC: 0.82 MG/DL (ref 0.55–1.02)
DIFFERENTIAL METHOD BLD: ABNORMAL
EGFR (NO RACE VARIABLE) (RUSH/TITUS): 74 ML/MIN/1.73M2
EOSINOPHIL # BLD AUTO: 0.11 K/UL (ref 0–0.5)
EOSINOPHIL NFR BLD AUTO: 1.5 % (ref 1–4)
ERYTHROCYTE [DISTWIDTH] IN BLOOD BY AUTOMATED COUNT: 13.7 % (ref 11.5–14.5)
GLOBULIN SER-MCNC: 3.1 G/DL (ref 2–4)
GLUCOSE SERPL-MCNC: 88 MG/DL (ref 74–106)
GLUCOSE UR STRIP-MCNC: NORMAL MG/DL
HCT VFR BLD AUTO: 36.3 % (ref 38–47)
HGB BLD-MCNC: 11.6 G/DL (ref 12–16)
IMM GRANULOCYTES # BLD AUTO: 0.03 K/UL (ref 0–0.04)
IMM GRANULOCYTES NFR BLD: 0.4 % (ref 0–0.4)
INR BLD: 1.03
KETONES UR STRIP-SCNC: NEGATIVE MG/DL
LEUKOCYTE ESTERASE UR QL STRIP: NEGATIVE
LYMPHOCYTES # BLD AUTO: 1.31 K/UL (ref 1–4.8)
LYMPHOCYTES NFR BLD AUTO: 18.3 % (ref 27–41)
MCH RBC QN AUTO: 29.6 PG (ref 27–31)
MCHC RBC AUTO-ENTMCNC: 32 G/DL (ref 32–36)
MCV RBC AUTO: 92.6 FL (ref 80–96)
MONOCYTES # BLD AUTO: 0.54 K/UL (ref 0–0.8)
MONOCYTES NFR BLD AUTO: 7.6 % (ref 2–6)
MPC BLD CALC-MCNC: 10 FL (ref 9.4–12.4)
NEUTROPHILS # BLD AUTO: 5.11 K/UL (ref 1.8–7.7)
NEUTROPHILS NFR BLD AUTO: 71.5 % (ref 53–65)
NITRITE UR QL STRIP: NEGATIVE
NRBC # BLD AUTO: 0 X10E3/UL
NRBC, AUTO (.00): 0 %
PH UR STRIP: 6.5 PH UNITS
PLATELET # BLD AUTO: 197 K/UL (ref 150–400)
POTASSIUM SERPL-SCNC: 3.8 MMOL/L (ref 3.5–5.1)
PROT SERPL-MCNC: 6.6 G/DL (ref 6.4–8.2)
PROT UR QL STRIP: NEGATIVE
PROTHROMBIN TIME: 13.4 SECONDS (ref 11.7–14.7)
RBC # BLD AUTO: 3.92 M/UL (ref 4.2–5.4)
RBC # UR STRIP: NEGATIVE /UL
SODIUM SERPL-SCNC: 140 MMOL/L (ref 136–145)
SP GR UR STRIP: 1.02
UROBILINOGEN UR STRIP-ACNC: NORMAL MG/DL
WBC # BLD AUTO: 7.15 K/UL (ref 4.5–11)

## 2024-09-28 PROCEDURE — 80053 COMPREHEN METABOLIC PANEL: CPT | Performed by: NURSE PRACTITIONER

## 2024-09-28 PROCEDURE — 99285 EMERGENCY DEPT VISIT HI MDM: CPT | Mod: 25

## 2024-09-28 PROCEDURE — 36415 COLL VENOUS BLD VENIPUNCTURE: CPT | Performed by: NURSE PRACTITIONER

## 2024-09-28 PROCEDURE — 85610 PROTHROMBIN TIME: CPT | Performed by: NURSE PRACTITIONER

## 2024-09-28 PROCEDURE — 85025 COMPLETE CBC W/AUTO DIFF WBC: CPT | Performed by: NURSE PRACTITIONER

## 2024-09-28 PROCEDURE — 81003 URINALYSIS AUTO W/O SCOPE: CPT | Performed by: NURSE PRACTITIONER

## 2024-09-29 NOTE — ED TRIAGE NOTES
"Ems from [facility] in [place] for fall. Daughter at bedside states pt fell aprox 0330 today. [facility] nursing home sent her at this time due to pt's neuro status has been "in and out", & her "BP was fluctuating" reports pt doesn't usually have high BP. Pt has hx of dementia. C. Collar in place per ems  "

## 2024-09-29 NOTE — ED PROVIDER NOTES
Encounter Date: 9/28/2024       History     Chief Complaint   Patient presents with    Fall     78-year-old female presents to ED via EMS from nursing home following fall around 3:30 p.m. today with elevated blood pressure and AMS.  Daughter at bedside states she is currently at her baseline neurologically, however nursing home reports her neurologic status was waxing and waning.  She had an unwitnessed fall and apparently hit her head.  Daughter states she is not on anticoagulation therapy.  Patient denies any pain or discomfort.  Daughter reports she has frequent falls and frequent UTIs.    The history is provided by the patient and a relative.     Review of patient's allergies indicates:   Allergen Reactions    Sulfa (sulfonamide antibiotics) Anaphylaxis    Codeine Nausea Only     Past Medical History:   Diagnosis Date    Achrochordon     Aphasia as late effect of cerebrovascular accident     Cervicalgia     Diabetes mellitus, type 2     Hiatal hernia with GERD     Insomnia secondary to depression with anxiety     Mixed hyperlipidemia 03/02/2022    Primary hypertension 03/02/2022    Senile dementia with behavioral disturbance     Sleep related leg cramps     Stroke     TMJ (dislocation of temporomandibular joint)     Vascular dementia with behavior disturbance 03/02/2022    Vitamin B 12 deficiency     Vitamin D deficiency      Past Surgical History:   Procedure Laterality Date    BREAST BIOPSY      rt.core biopsy    carotid dopple complete      CHOLECYSTECTOMY      COLONOSCOPY  01/10/2017    repeat in 5 years    ESOPHAGOGASTRODUODENOSCOPY  11/29/2016    HYSTERECTOMY       Family History   Problem Relation Name Age of Onset    Hypertension Mother      Hyperlipidemia Mother      Heart disease Father      Hypertension Father      Emphysema Father       Social History     Tobacco Use    Smoking status: Never     Passive exposure: Never    Smokeless tobacco: Never   Substance Use Topics    Alcohol use: Never    Drug  use: Never     Review of Systems   Constitutional:  Negative for chills and fever.   HENT: Negative.     Eyes: Negative.    Cardiovascular: Negative.    Gastrointestinal: Negative.    Musculoskeletal:  Negative for back pain and neck pain.       Physical Exam     Initial Vitals [09/28/24 1953]   BP Pulse Resp Temp SpO2   (!) 154/80 96 20 98.3 °F (36.8 °C) 99 %      MAP       --         Physical Exam    Constitutional: Vital signs are normal. She appears well-developed. She has a sickly appearance. No distress.   HENT: Mouth/Throat: Uvula is midline, oropharynx is clear and moist and mucous membranes are normal.   Eyes: Conjunctivae and lids are normal. Pupils are equal, round, and reactive to light.   Neck: Trachea normal. Neck supple.   Normal range of motion.   Full passive range of motion without pain.     Cardiovascular:  Normal rate and regular rhythm.           Pulmonary/Chest: Effort normal and breath sounds normal.   Abdominal: Abdomen is soft. Bowel sounds are normal. There is no abdominal tenderness.   Musculoskeletal:      Right shoulder: Normal. No swelling or tenderness. Normal range of motion.      Left shoulder: Normal. No swelling or tenderness. Normal range of motion.      Right elbow: Normal. Normal range of motion.      Left elbow: Normal. Normal range of motion.      Right forearm: Normal. No swelling or tenderness.      Left forearm: Normal. No swelling or tenderness.      Right wrist: Normal. No tenderness.      Left wrist: Normal. No tenderness.      Right hand: Normal. No tenderness.      Left hand: Normal. No tenderness.      Cervical back: Normal, full passive range of motion without pain, normal range of motion and neck supple. No swelling, rigidity or tenderness. Normal range of motion.      Thoracic back: Normal. No tenderness.      Lumbar back: Normal. No tenderness.      Right hip: Normal. No bony tenderness. Normal range of motion.      Left hip: Normal. No bony tenderness. Normal  range of motion.      Right upper leg: No tenderness.      Left upper leg: No tenderness.      Right knee: Normal range of motion. No tenderness.      Left knee: Normal range of motion. No tenderness.      Right lower leg: No tenderness.      Left lower leg: No tenderness.      Right ankle: No tenderness.      Left ankle: No tenderness.     Neurological: She is alert. She has normal strength. GCS eye subscore is 4. GCS verbal subscore is 5. GCS motor subscore is 6.   Per daughter at bedside, she has dementia and is at her baseline         Medical Screening Exam   See Full Note    ED Course   Procedures  Labs Reviewed   COMPREHENSIVE METABOLIC PANEL - Abnormal       Result Value    Sodium 140      Potassium 3.8      Chloride 106      CO2 29      Anion Gap 9      Glucose 88      BUN 14      Creatinine 0.82      BUN/Creatinine Ratio 17      Calcium 8.4 (*)     Total Protein 6.6      Albumin 3.5      Globulin 3.1      A/G Ratio 1.1      Bilirubin, Total 0.3      Alk Phos 184 (*)     ALT 22      AST 25      eGFR 74     CBC WITH DIFFERENTIAL - Abnormal    WBC 7.15      RBC 3.92 (*)     Hemoglobin 11.6 (*)     Hematocrit 36.3 (*)     MCV 92.6      MCH 29.6      MCHC 32.0      RDW 13.7      Platelet Count 197      MPV 10.0      Neutrophils % 71.5 (*)     Lymphocytes % 18.3 (*)     Monocytes % 7.6 (*)     Eosinophils % 1.5      Basophils % 0.7      Immature Granulocytes % 0.4      nRBC, Auto 0.0      Neutrophils, Abs 5.11      Lymphocytes, Absolute 1.31      Monocytes, Absolute 0.54      Eosinophils, Absolute 0.11      Basophils, Absolute 0.05      Immature Granulocytes, Absolute 0.03      nRBC, Absolute 0.00      Diff Type Auto     PROTIME-INR - Normal    PT 13.4      INR 1.03     CBC W/ AUTO DIFFERENTIAL    Narrative:     The following orders were created for panel order CBC auto differential.  Procedure                               Abnormality         Status                     ---------                                -----------         ------                     CBC with Differential[[id number]]       Abnormal            Final result                 Please view results for these tests on the individual orders.   URINALYSIS, REFLEX TO URINE CULTURE    Color, UA Light-Yellow      Clarity, UA Clear      pH, UA 6.5      Leukocytes, UA Negative      Nitrites, UA Negative      Protein, UA Negative      Glucose, UA Normal      Ketones, UA Negative      Urobilinogen, UA Normal      Bilirubin, UA Negative      Blood, UA Negative      Specific Gravity, UA 1.020            Imaging Results               CT Head Without Contrast (Final result)  Result time 09/28/24 20:51:29      Final result by Galen Davila MD (09/28/24 20:51:29)                   Impression:      Small subdural hematoma over the LEFT cerebral convexity measuring 3 mm in maximal thickness.    No evidence of mass effect or intraparenchymal bleed or infarct.    Stable involutional and chronic small vessel changes.    This report was flagged in Epic as abnormal.    Follow-up CT per protocol.    Findings were transmitted to Cheryl CUEVAS in the emergency department via [employer] secure chat with confirmation of receipt..      Electronically signed by: Galen Davila  Date:    09/28/2024  Time:    20:51               Narrative:    EXAMINATION:  CT HEAD WITHOUT CONTRAST    CLINICAL HISTORY:  Head trauma, minor (Age >= 65y);    TECHNIQUE:  Low dose axial images were obtained through the head.  Coronal and sagittal reformations were also performed. Contrast was not administered.    COMPARISON:  CT brain, 08/01/2024    FINDINGS:  There is 3 mm subdural hematoma over the left frontal parietal temporal lobe with no mass effect on the adjacent brain parenchyma.  Involutional changes with low density throughout the deep and subcortical white matter and prominence of cortical sulcal markings basilar cisterns and ventricular system appear stable elsewhere.    No contrecoup injury.   No fracture.  No scalp soft tissue swelling.  Sinuses clear.  Orbits remain intact with bilateral ocular lens replacements.                                       Medications - No data to display  Medical Decision Making  Amount and/or Complexity of Data Reviewed  Labs: ordered. Decision-making details documented in ED Course.  Radiology: ordered. Decision-making details documented in ED Course.               ED Course as of 09/29/24 1151   Sat Sep 28, 2024   2107 Head CT with Small subdural hematoma over the LEFT cerebral convexity measuring 3 mm in maximal thickness.  Discussed with Dr. Justice who states patient needs to be transferred to facility with neuro/neurosurgery for further monitoring.  PFC ordered, family informed. [AM]   2120 Daughter at bedside states she has discussed options with her brother and they are declining transfer to another facility. She states her mother is a DNR and has poor quality of life at this point, they feel like transferring her to another facility would be too much on her and they would not want/pursue surgical intervention should her situation take a turn for the worse. She understands if subdural hematoma acutely changes there is nothing further we could do. She is okay with transferring her mom back to nursing home. We are checking a UA prior to discharging. [AM]   2316 UA normal. Will discharge back to nursing home as requested by daughter. [AM]      ED Course User Index  [AM] Cheryl Choudhury FNP                           Clinical Impression:   Final diagnoses:  [S06.5XAA] Subdural hematoma (Primary)        ED Disposition Condition    Discharge Stable          ED Prescriptions    None       Follow-up Information       Follow up With Specialties Details Why Contact Info    [facility] - Emergency Department Emergency Medicine  As needed [address] [place] [id number] [phone]             Cheryl Choudhury FNP  09/29/24 1151

## 2024-10-10 ENCOUNTER — HOSPITAL ENCOUNTER (OUTPATIENT)
Dept: RADIOLOGY | Facility: HOSPITAL | Age: 79
Discharge: HOME | End: 2024-10-10
Attending: FAMILY MEDICINE
Payer: MEDICARE

## 2024-10-10 DIAGNOSIS — R53.1 WEAKNESS: ICD-10-CM

## 2024-10-10 PROCEDURE — 70450 CT HEAD/BRAIN W/O DYE: CPT | Mod: TC

## 2024-10-10 PROCEDURE — 70450 CT HEAD/BRAIN W/O DYE: CPT | Mod: 26,,, | Performed by: RADIOLOGY

## 2024-10-30 ENCOUNTER — OFFICE VISIT (OUTPATIENT)
Dept: NEUROLOGY | Facility: CLINIC | Age: 79
End: 2024-10-30
Payer: MEDICARE

## 2024-10-30 VITALS
SYSTOLIC BLOOD PRESSURE: 144 MMHG | HEART RATE: 90 BPM | BODY MASS INDEX: 19.72 KG/M2 | OXYGEN SATURATION: 95 % | WEIGHT: 118.38 LBS | DIASTOLIC BLOOD PRESSURE: 76 MMHG | HEIGHT: 65 IN | RESPIRATION RATE: 18 BRPM

## 2024-10-30 DIAGNOSIS — F02.C4 SEVERE LATE ONSET ALZHEIMER'S DEMENTIA WITH ANXIETY: Primary | ICD-10-CM

## 2024-10-30 DIAGNOSIS — I63.9 CEREBROVASCULAR ACCIDENT (CVA), UNSPECIFIED MECHANISM: ICD-10-CM

## 2024-10-30 DIAGNOSIS — S06.5XAA SUBDURAL HEMATOMA: ICD-10-CM

## 2024-10-30 DIAGNOSIS — G30.1 SEVERE LATE ONSET ALZHEIMER'S DEMENTIA WITH ANXIETY: Primary | ICD-10-CM
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
## 2024-10-30 PROCEDURE — 99999 PR PBB SHADOW E&M-EST. PATIENT-LVL V: CPT | Mod: PBBFAC,,, | Performed by: NURSE PRACTITIONER

## 2024-10-30 PROCEDURE — 99215 OFFICE O/P EST HI 40 MIN: CPT | Mod: PBBFAC | Performed by: NURSE PRACTITIONER

## 2024-10-30 PROCEDURE — 99213 OFFICE O/P EST LOW 20 MIN: CPT | Mod: S$PBB,,, | Performed by: NURSE PRACTITIONER

## 2024-10-30 RX ORDER — AMOXICILLIN 500 MG/1
CAPSULE ORAL
COMMUNITY
Start: 2024-10-24

## 2024-11-07 ENCOUNTER — HOSPITAL ENCOUNTER (OUTPATIENT)
Dept: RADIOLOGY | Facility: HOSPITAL | Age: 79
Discharge: HOME | End: 2024-11-07
Attending: FAMILY MEDICINE
Payer: MEDICARE

## 2024-11-07 DIAGNOSIS — S06.5XAA SUBDURAL HEMATOMA: ICD-10-CM

## 2024-11-07 PROCEDURE — 70450 CT HEAD/BRAIN W/O DYE: CPT | Mod: TC

## 2024-11-07 PROCEDURE — 70450 CT HEAD/BRAIN W/O DYE: CPT | Mod: 26,,, | Performed by: RADIOLOGY

## 2024-12-04 ENCOUNTER — LAB REQUISITION (OUTPATIENT)
Dept: LAB | Facility: HOSPITAL | Age: 79
End: 2024-12-04
Payer: MEDICARE

## 2024-12-04 DIAGNOSIS — E11.9 TYPE 2 DIABETES MELLITUS WITHOUT COMPLICATIONS: ICD-10-CM

## 2024-12-04 PROCEDURE — 83036 HEMOGLOBIN GLYCOSYLATED A1C: CPT

## 2024-12-05 LAB
EST. AVERAGE GLUCOSE BLD GHB EST-MCNC: 117 MG/DL
HBA1C MFR BLD HPLC: 5.7 %

## 2025-02-18 ENCOUNTER — LAB REQUISITION (OUTPATIENT)
Dept: LAB | Facility: HOSPITAL | Age: 80
End: 2025-02-18
Attending: FAMILY MEDICINE
Payer: MEDICARE

## 2025-02-18 DIAGNOSIS — J10.1 INFLUENZA DUE TO OTHER IDENTIFIED INFLUENZA VIRUS WITH OTHER RESPIRATORY MANIFESTATIONS: ICD-10-CM

## 2025-02-18 DIAGNOSIS — R50.9 FEVER, UNSPECIFIED: ICD-10-CM

## 2025-02-18 LAB
ANION GAP SERPL CALCULATED.3IONS-SCNC: 14 MMOL/L (ref 7–16)
BASOPHILS # BLD AUTO: 0.01 K/UL (ref 0–0.2)
BASOPHILS NFR BLD AUTO: 0.2 % (ref 0–1)
BUN SERPL-MCNC: 11 MG/DL (ref 10–20)
BUN/CREAT SERPL: 15 (ref 6–20)
CALCIUM SERPL-MCNC: 9 MG/DL (ref 8.4–10.2)
CHLORIDE SERPL-SCNC: 102 MMOL/L (ref 98–107)
CO2 SERPL-SCNC: 30 MMOL/L (ref 23–31)
CREAT SERPL-MCNC: 0.75 MG/DL (ref 0.55–1.02)
DIFFERENTIAL METHOD BLD: ABNORMAL
EGFR (NO RACE VARIABLE) (RUSH/TITUS): 82 ML/MIN/1.73M2
EOSINOPHIL # BLD AUTO: 0.04 K/UL (ref 0–0.5)
EOSINOPHIL NFR BLD AUTO: 0.8 % (ref 1–4)
ERYTHROCYTE [DISTWIDTH] IN BLOOD BY AUTOMATED COUNT: 13.1 % (ref 11.5–14.5)
GLUCOSE SERPL-MCNC: 104 MG/DL (ref 82–115)
HCT VFR BLD AUTO: 37 % (ref 38–47)
HGB BLD-MCNC: 12.3 G/DL (ref 12–16)
IMM GRANULOCYTES # BLD AUTO: 0.01 K/UL (ref 0–0.04)
IMM GRANULOCYTES NFR BLD: 0.2 % (ref 0–0.4)
INFLUENZA A MOLECULAR (OHS): NEGATIVE
INFLUENZA B MOLECULAR (OHS): NEGATIVE
LYMPHOCYTES # BLD AUTO: 0.7 K/UL (ref 1–4.8)
LYMPHOCYTES NFR BLD AUTO: 13.8 % (ref 27–41)
MCH RBC QN AUTO: 30.1 PG (ref 27–31)
MCHC RBC AUTO-ENTMCNC: 33.2 G/DL (ref 32–36)
MCV RBC AUTO: 90.7 FL (ref 80–96)
MONOCYTES # BLD AUTO: 0.33 K/UL (ref 0–0.8)
MONOCYTES NFR BLD AUTO: 6.5 % (ref 2–6)
MPC BLD CALC-MCNC: 10.2 FL (ref 9.4–12.4)
NEUTROPHILS # BLD AUTO: 3.99 K/UL (ref 1.8–7.7)
NEUTROPHILS NFR BLD AUTO: 78.5 % (ref 53–65)
NRBC # BLD AUTO: 0 X10E3/UL
NRBC, AUTO (.00): 0 %
PLATELET # BLD AUTO: 151 K/UL (ref 150–400)
POTASSIUM SERPL-SCNC: 3.5 MMOL/L (ref 3.5–5.1)
RBC # BLD AUTO: 4.08 M/UL (ref 4.2–5.4)
RSV AG SPEC QL IA: NEGATIVE
SODIUM SERPL-SCNC: 142 MMOL/L (ref 136–145)
WBC # BLD AUTO: 5.08 K/UL (ref 4.5–11)

## 2025-02-18 PROCEDURE — 87502 INFLUENZA DNA AMP PROBE: CPT | Performed by: FAMILY MEDICINE

## 2025-02-18 PROCEDURE — 85025 COMPLETE CBC W/AUTO DIFF WBC: CPT | Performed by: FAMILY MEDICINE

## 2025-02-18 PROCEDURE — 87634 RSV DNA/RNA AMP PROBE: CPT | Performed by: FAMILY MEDICINE

## 2025-02-18 PROCEDURE — 80048 BASIC METABOLIC PNL TOTAL CA: CPT | Performed by: FAMILY MEDICINE

## 2025-03-05 ENCOUNTER — HOSPITAL ENCOUNTER (EMERGENCY)
Facility: HOSPITAL | Age: 80
Discharge: NURSING, REHAB OR LONG-TERM CARE | End: 2025-03-05
Payer: MEDICARE

## 2025-03-05 VITALS
TEMPERATURE: 98 F | HEIGHT: 65 IN | RESPIRATION RATE: 16 BRPM | OXYGEN SATURATION: 96 % | SYSTOLIC BLOOD PRESSURE: 154 MMHG | BODY MASS INDEX: 18.18 KG/M2 | DIASTOLIC BLOOD PRESSURE: 77 MMHG | HEART RATE: 65 BPM | WEIGHT: 109.13 LBS

## 2025-03-05 DIAGNOSIS — N39.0 URINARY TRACT INFECTION WITH HEMATURIA, SITE UNSPECIFIED: ICD-10-CM

## 2025-03-05 DIAGNOSIS — S00.83XA FACIAL HEMATOMA, INITIAL ENCOUNTER: ICD-10-CM

## 2025-03-05 DIAGNOSIS — R41.82 AMS (ALTERED MENTAL STATUS): ICD-10-CM

## 2025-03-05 DIAGNOSIS — R29.6 FREQUENT FALLS: ICD-10-CM
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
## 2025-03-05 DIAGNOSIS — R31.9 URINARY TRACT INFECTION WITH HEMATURIA, SITE UNSPECIFIED: ICD-10-CM

## 2025-03-05 LAB
ALBUMIN SERPL BCP-MCNC: 3.8 G/DL (ref 3.4–4.8)
ALBUMIN/GLOB SERPL: 1.2 {RATIO}
ALP SERPL-CCNC: 174 U/L (ref 40–150)
ALT SERPL W P-5'-P-CCNC: 20 U/L
AMPHET UR QL SCN: POSITIVE
ANION GAP SERPL CALCULATED.3IONS-SCNC: 13 MMOL/L (ref 7–16)
APTT PPP: 23.2 SECONDS (ref 25.2–37.3)
AST SERPL W P-5'-P-CCNC: 26 U/L (ref 5–34)
BACTERIA #/AREA URNS HPF: ABNORMAL /HPF
BARBITURATES UR QL SCN: NEGATIVE
BASOPHILS # BLD AUTO: 0.06 K/UL (ref 0–0.2)
BASOPHILS NFR BLD AUTO: 0.9 % (ref 0–1)
BENZODIAZ METAB UR QL SCN: NEGATIVE
BILIRUB SERPL-MCNC: 0.3 MG/DL
BILIRUB UR QL STRIP: NEGATIVE
BUN SERPL-MCNC: 17 MG/DL (ref 10–20)
BUN/CREAT SERPL: 22 (ref 6–20)
CALCIUM SERPL-MCNC: 9.5 MG/DL (ref 8.4–10.2)
CANNABINOIDS UR QL SCN: NEGATIVE
CHLORIDE SERPL-SCNC: 107 MMOL/L (ref 98–107)
CLARITY UR: ABNORMAL
CO2 SERPL-SCNC: 30 MMOL/L (ref 23–31)
COCAINE UR QL SCN: NEGATIVE
COLOR UR: YELLOW
CREAT SERPL-MCNC: 0.76 MG/DL (ref 0.55–1.02)
DIFFERENTIAL METHOD BLD: ABNORMAL
EGFR (NO RACE VARIABLE) (RUSH/TITUS): 80 ML/MIN/1.73M2
EOSINOPHIL # BLD AUTO: 0.12 K/UL (ref 0–0.5)
EOSINOPHIL NFR BLD AUTO: 1.9 % (ref 1–4)
ERYTHROCYTE [DISTWIDTH] IN BLOOD BY AUTOMATED COUNT: 13.2 % (ref 11.5–14.5)
ETHANOL SERPL-MCNC: <10 MG/DL
GLOBULIN SER-MCNC: 3.2 G/DL (ref 2–4)
GLUCOSE SERPL-MCNC: 107 MG/DL (ref 82–115)
GLUCOSE UR STRIP-MCNC: NEGATIVE MG/DL
HCT VFR BLD AUTO: 35.4 % (ref 38–47)
HGB BLD-MCNC: 11.9 G/DL (ref 12–16)
IMM GRANULOCYTES # BLD AUTO: 0.01 K/UL (ref 0–0.04)
IMM GRANULOCYTES NFR BLD: 0.2 % (ref 0–0.4)
INR BLD: 0.92
KETONES UR STRIP-SCNC: NEGATIVE MG/DL
LACTATE SERPL-SCNC: 1.2 MMOL/L (ref 0.5–2.2)
LEUKOCYTE ESTERASE UR QL STRIP: ABNORMAL
LYMPHOCYTES # BLD AUTO: 1.63 K/UL (ref 1–4.8)
LYMPHOCYTES NFR BLD AUTO: 25.7 % (ref 27–41)
MAGNESIUM SERPL-MCNC: 2.2 MG/DL (ref 1.6–2.6)
MCH RBC QN AUTO: 30.4 PG (ref 27–31)
MCHC RBC AUTO-ENTMCNC: 33.6 G/DL (ref 32–36)
MCV RBC AUTO: 90.3 FL (ref 80–96)
MONOCYTES # BLD AUTO: 0.43 K/UL (ref 0–0.8)
MONOCYTES NFR BLD AUTO: 6.8 % (ref 2–6)
MPC BLD CALC-MCNC: 9.1 FL (ref 9.4–12.4)
NEUTROPHILS # BLD AUTO: 4.09 K/UL (ref 1.8–7.7)
NEUTROPHILS NFR BLD AUTO: 64.5 % (ref 53–65)
NITRITE UR QL STRIP: POSITIVE
NRBC # BLD AUTO: 0 X10E3/UL
NRBC, AUTO (.00): 0 %
OPIATES UR QL SCN: NEGATIVE
PCP UR QL SCN: NEGATIVE
PH UR STRIP: 6.5 PH UNITS
PLATELET # BLD AUTO: 234 K/UL (ref 150–400)
POTASSIUM SERPL-SCNC: 4.5 MMOL/L (ref 3.5–5.1)
PROT SERPL-MCNC: 7 G/DL (ref 5.8–7.6)
PROT UR QL STRIP: 30
PROTHROMBIN TIME: 13 SECONDS (ref 11.7–14.7)
RBC # BLD AUTO: 3.92 M/UL (ref 4.2–5.4)
RBC # UR STRIP: ABNORMAL /UL
RBC #/AREA URNS HPF: ABNORMAL /HPF
SODIUM SERPL-SCNC: 145 MMOL/L (ref 136–145)
SP GR UR STRIP: 1.02
SQUAMOUS #/AREA URNS LPF: ABNORMAL /LPF
UROBILINOGEN UR STRIP-ACNC: 0.2 MG/DL
WBC # BLD AUTO: 6.34 K/UL (ref 4.5–11)
WBC #/AREA URNS HPF: ABNORMAL /HPF

## 2025-03-05 PROCEDURE — G0390 TRAUMA RESPONS W/HOSP CRITI: HCPCS

## 2025-03-05 PROCEDURE — 93005 ELECTROCARDIOGRAM TRACING: CPT

## 2025-03-05 PROCEDURE — 25000003 PHARM REV CODE 250

## 2025-03-05 PROCEDURE — 85610 PROTHROMBIN TIME: CPT

## 2025-03-05 PROCEDURE — 87186 SC STD MICRODIL/AGAR DIL: CPT

## 2025-03-05 PROCEDURE — 25500020 PHARM REV CODE 255

## 2025-03-05 PROCEDURE — 63600175 PHARM REV CODE 636 W HCPCS

## 2025-03-05 PROCEDURE — 99284 EMERGENCY DEPT VISIT MOD MDM: CPT | Mod: GF,EDII,,

## 2025-03-05 PROCEDURE — 82077 ASSAY SPEC XCP UR&BREATH IA: CPT

## 2025-03-05 PROCEDURE — 96374 THER/PROPH/DIAG INJ IV PUSH: CPT

## 2025-03-05 PROCEDURE — 93010 ELECTROCARDIOGRAM REPORT: CPT | Performed by: HOSPITALIST

## 2025-03-05 PROCEDURE — 83735 ASSAY OF MAGNESIUM: CPT

## 2025-03-05 PROCEDURE — 81003 URINALYSIS AUTO W/O SCOPE: CPT

## 2025-03-05 PROCEDURE — 96361 HYDRATE IV INFUSION ADD-ON: CPT

## 2025-03-05 PROCEDURE — 80307 DRUG TEST PRSMV CHEM ANLYZR: CPT

## 2025-03-05 PROCEDURE — 99285 EMERGENCY DEPT VISIT HI MDM: CPT | Mod: 25

## 2025-03-05 PROCEDURE — 85730 THROMBOPLASTIN TIME PARTIAL: CPT

## 2025-03-05 PROCEDURE — 85025 COMPLETE CBC W/AUTO DIFF WBC: CPT

## 2025-03-05 PROCEDURE — 80053 COMPREHEN METABOLIC PANEL: CPT

## 2025-03-05 PROCEDURE — 83605 ASSAY OF LACTIC ACID: CPT

## 2025-03-05 RX ORDER — IOPAMIDOL 755 MG/ML
100 INJECTION, SOLUTION INTRAVASCULAR
Status: COMPLETED | OUTPATIENT
Start: 2025-03-05 | End: 2025-03-05

## 2025-03-05 RX ORDER — OXCARBAZEPINE 150 MG/1
150 TABLET, FILM COATED ORAL 2 TIMES DAILY
COMMUNITY
Start: 2025-02-06

## 2025-03-05 RX ORDER — CEFTRIAXONE 1 G/1
1 INJECTION, POWDER, FOR SOLUTION INTRAMUSCULAR; INTRAVENOUS
Status: COMPLETED | OUTPATIENT
Start: 2025-03-05 | End: 2025-03-05

## 2025-03-05 RX ORDER — CARBAMAZEPINE 200 MG/1
200 TABLET ORAL 2 TIMES DAILY
COMMUNITY
Start: 2025-02-12

## 2025-03-05 RX ADMIN — CEFTRIAXONE 1 G: 1 INJECTION, POWDER, FOR SOLUTION INTRAMUSCULAR; INTRAVENOUS at 03:03

## 2025-03-05 RX ADMIN — IOPAMIDOL 100 ML: 755 INJECTION, SOLUTION INTRAVENOUS at 04:03

## 2025-03-05 RX ADMIN — SODIUM CHLORIDE 500 ML: 9 INJECTION, SOLUTION INTRAVENOUS at 03:03

## 2025-03-05 NOTE — DISCHARGE INSTRUCTIONS
Recommend Rocephin 1 g IM daily x5 days for treatment of UTI.  There is a urine culture pending and we will notify the nursing home if there are any changes in to be made once these results are available.  Fall precautions.  Follow up with PCP at nursing home tomorrow for a recheck.  Read and follow head injury instructions as directed.  Return to the emergency department for any new or worrisome symptoms.

## 2025-03-05 NOTE — ED PROVIDER NOTES
Encounter Date: 3/5/2025       History     Chief Complaint   Patient presents with    Fall     79-year-old female sent from [facility] for evaluation of altered mental status.  They report that she is been responsive to tactile stimuli and minimally verbal with confusion.  They state that she did have a fall 2 days prior as well as an additional fall 1 week ago.  Recently reported recovery from subdural hematoma per nursing home RN.  They do admit to decreased oral intake and a concern for dehydration as well.  Patient is a unable to provide any further history at this time.  Code status DNR per nursing home records.    The history is provided by the EMS personnel and medical records.     Review of patient's allergies indicates:   Allergen Reactions    Sulfa (sulfonamide antibiotics) Anaphylaxis    Codeine Nausea Only     Past Medical History:   Diagnosis Date    Achrochordon     Aphasia as late effect of cerebrovascular accident     Cervicalgia     Diabetes mellitus, type 2     Hiatal hernia with GERD     Insomnia secondary to depression with anxiety     Mixed hyperlipidemia 03/02/2022    Primary hypertension 03/02/2022    Senile dementia with behavioral disturbance     Sleep related leg cramps     Stroke     TMJ (dislocation of temporomandibular joint)     Vascular dementia with behavior disturbance 03/02/2022    Vitamin B 12 deficiency     Vitamin D deficiency      Past Surgical History:   Procedure Laterality Date    BREAST BIOPSY      rt.core biopsy    carotid dopple complete      CHOLECYSTECTOMY      COLONOSCOPY  01/10/2017    repeat in 5 years    ESOPHAGOGASTRODUODENOSCOPY  11/29/2016    HYSTERECTOMY       Family History   Problem Relation Name Age of Onset    Hypertension Mother      Hyperlipidemia Mother      Heart disease Father      Hypertension Father      Emphysema Father       Social History[1]  Review of Systems   Unable to perform ROS: Mental status change       Physical Exam      Initial Vitals   BP Pulse Resp Temp SpO2   03/05/25 1411 03/05/25 1353 03/05/25 1353 03/05/25 1353 03/05/25 1353   116/75 64 20 98.8 °F (37.1 °C) 100 %      MAP       --                Physical Exam    Nursing note and vitals reviewed.  Constitutional: She is not diaphoretic. She appears ill.   Arouses to light tactile stimulation.  Remains confused.   HENT:   Head: Normocephalic.       Right Ear: External ear normal.   Left Ear: External ear normal.   Nose: Nose normal. Mouth/Throat: Oropharynx is clear and moist.   Eyes: Conjunctivae and EOM are normal. Pupils are equal, round, and reactive to light.   Neck: Trachea normal.   Cardiovascular:  Normal rate, regular rhythm, normal heart sounds and normal pulses.           Pulmonary/Chest: Effort normal and breath sounds normal. No respiratory distress. She has no wheezes. She has no rhonchi. She has no rales. She exhibits no tenderness.   Abdominal: Abdomen is soft. Bowel sounds are normal. She exhibits no distension. There is no abdominal tenderness. There is no rebound and no guarding.   Musculoskeletal:         General: Normal range of motion.      Cervical back: No edema. No spinous process tenderness or muscular tenderness.     Neurological: She is oriented to person, place, and time. GCS score is 15. GCS eye subscore is 4. GCS verbal subscore is 2. GCS motor subscore is 6.   Skin: Skin is warm and dry. Capillary refill takes less than 2 seconds.   Psychiatric: She has a normal mood and affect. Her behavior is normal. Judgment and thought content normal.         Medical Screening Exam   See Full Note    ED Course   Procedures  Labs Reviewed   COMPREHENSIVE METABOLIC PANEL - Abnormal       Result Value    Sodium 145      Potassium 4.5      Chloride 107      CO2 30      Anion Gap 13      Glucose 107      BUN 17      Creatinine 0.76      BUN/Creatinine Ratio 22 (*)     Calcium 9.5      Total Protein 7.0      Albumin 3.8      Globulin 3.2      A/G Ratio 1.2       Bilirubin, Total 0.3      Alk Phos 174 (*)     ALT 20      AST 26      eGFR 80     APTT - Abnormal    PTT 23.2 (*)    URINALYSIS, REFLEX TO URINE CULTURE - Abnormal    Color, UA Yellow      Clarity, UA Cloudy      pH, UA 6.5      Leukocytes, UA Small (*)     Nitrites, UA Positive (*)     Protein, UA 30 (*)     Glucose, UA Negative      Ketones, UA Negative      Urobilinogen, UA 0.2      Bilirubin, UA Negative      Blood, UA Small (*)     Specific [place], UA 1.025     DRUG SCREEN, URINE (BEAKER) - Abnormal    Barbiturates, Urine Negative      Benzodiazepine, Urine Negative      Opiates, Urine Negative      Phencyclidine, Urine Negative      Amphetamine, Urine Positive (*)     Cannabinoid, Urine Negative      Cocaine, Urine Negative      Narrative:     This screen includes the following classes of drugs at the listed cut-off:    Benzodiazepines 200 ng/ml  Cocaine metabolite 300 ng/ml  Opiates 2000 ng/ml  Barbiturates 200 ng/ml  Amphetamines 500 ng/ml  Marijuana metabs (THC) 50 ng/ml  Phencyclidine (PCP) 25 ng/ml    This is a screening test. If results do not correlate with clinical presentation, then a confirmatory send out test is advised.   CBC WITH DIFFERENTIAL - Abnormal    WBC 6.34      RBC 3.92 (*)     Hemoglobin 11.9 (*)     Hematocrit 35.4 (*)     MCV 90.3      MCH 30.4      MCHC 33.6      RDW 13.2      Platelet Count 234      MPV 9.1 (*)     Neutrophils % 64.5      Lymphocytes % 25.7 (*)     Monocytes % 6.8 (*)     Eosinophils % 1.9      Basophils % 0.9      Immature Granulocytes % 0.2      nRBC, Auto 0.0      Neutrophils, Abs 4.09      Lymphocytes, Absolute 1.63      Monocytes, Absolute 0.43      Eosinophils, Absolute 0.12      Basophils, Absolute 0.06      Immature Granulocytes, Absolute 0.01      nRBC, Absolute 0.00      Diff Type Auto     URINALYSIS, MICROSCOPIC - Abnormal    WBC, UA 11-15 (*)     RBC, UA 0-3      Bacteria, UA Many (*)     Squamous Epithelial Cells, UA Rare     LACTIC ACID, PLASMA -  Normal    Lactic Acid 1.2     PROTIME-INR - Normal    PT 13.0      INR 0.92     MAGNESIUM - Normal    Magnesium 2.2     ALCOHOL,MEDICAL (ETHANOL) - Normal    Ethanol <10     CULTURE, URINE   CBC W/ AUTO DIFFERENTIAL    Narrative:     The following orders were created for panel order CBC auto differential.  Procedure                               Abnormality         Status                     ---------                               -----------         ------                     CBC with Differential[[id number]]       Abnormal            Final result                 Please view results for these tests on the individual orders.   POCT GLUCOSE MONITORING CONTINUOUS          Imaging Results              CT Chest Abdomen Pelvis With IV Contrast (XPD) Routine Oral Contrast (Final result)  Result time 03/05/25 17:00:21      Final result by Herberth Taylor MD (03/05/25 17:00:21)                   Impression:      1. Constipation. There is prominent retained feces in the colon and rectum. There is dilation and distention of the rectum. Minimal rectal wall thickening is noted may be associated with stercoral proctocolitis.  Minimal edema fluid in the presacral space posterior to the rectum also. Follow-up recommended.  2. Suspected large nonobstructing stone at the upper pole of the left kidney with limited visualization.  See above comments also.  3. Motion artifact which diminishes the sensitivity.      Electronically signed by: Herberth Taylor  Date:    03/05/2025  Time:    17:00               Narrative:    EXAMINATION:  CT CHEST ABDOMEN PELVIS WITH IV CONTRAST (XPD)    CLINICAL HISTORY:  Polytrauma, blunt;    TECHNIQUE:  Low dose axial, sagittal and coronal reformations were performed from the thoracic inlet to the pubic symphysis following the IV administration of 100 mL of Isovue 370.  No oral contrast was given.    COMPARISON:  05/20/2014    FINDINGS:  Motion artifact.    Chest:    Heart and great vessels: Within normal  limits.    Adenopathy: None demonstrated.    Lungs: Clear bilaterally.    Abdomen:    Liver: Within normal limits.    Gallbladder and biliary: Within normal limits.    Spleen: Within normal limits.    Pancreas: Limited visualization with significant motion artifact.    Adrenals: Limited visualization.    Kidneys: Probable large stone at the upper pole the left kidney with limited characterization with significant motion artifact.  No hydronephrosis bilaterally.  Simple right renal cyst is noted.  No hydroureter bilaterally.    Bowel: There is prominent retained feces in the colon and rectum.  There is dilation and distention of the rectum.  Minimal rectal wall thickening is noted may be associated with stercoral proctocolitis.    Minimal edema fluid in the presacral space posterior to the rectum also.  Follow-up recommended.    Peritoneum: No ascites or pneumoperitoneum.    Abdominal Adenopathy: None.    Vasculature: Within normal limits.    Pelvis:    Urinary bladder: Unremarkable.    Female: Within normal limits.    Pelvis adenopathy: None.    Bones: No acute findings.    Miscellaneous: None.                                       CT Maxillofacial Without Contrast (Final result)  Result time 03/05/25 15:21:48      Final result by Donaldo Whelan MD (03/05/25 15:21:48)                   Impression:      No evidence of acute fracture or malalignment of the maxillofacial structures.    Right frontal subcutaneous hematoma.    Changes of functional endoscopic sinus surgery.    Paranasal sinus disease.      Electronically signed by: Donaldo Whelan MD  Date:    03/05/2025  Time:    15:21               Narrative:    EXAMINATION:  CT MAXILLOFACIAL WITHOUT CONTRAST    CLINICAL HISTORY:  Maxillofacial pain;    TECHNIQUE:  Low dose axial images, sagittal and coronal reformations were obtained through the face.  Contrast was not administered.    COMPARISON:  CT maxillofacial dated 07/10/2024.    FINDINGS:  There is a right frontal  subcutaneous hematoma.  The underlying calvarium is intact.  There are changes of prior functional endoscopic sinus surgery.  There is mucosal thickening within the ethmoid sinuses.  There is opacification of the sphenoid sinuses.  The mastoid air cells are clear.    There are postoperative changes in the globes.  The orbital walls are intact.  The zygomatic arches are within normal limits.  The pterygoid plates are intact.  The mandible and maxilla are within normal limits.    There is no evidence acute fracture or malalignment of the maxillofacial structures.                                       CT Cervical Spine Without Contrast (Final result)  Result time 03/05/25 15:17:40      Final result by Donaldo Whelna MD (03/05/25 15:17:40)                   Impression:      No evidence of acute fracture or listhesis of the cervical spine.    Multilevel degenerative changes in the cervical spine.    Paranasal sinus disease.      Electronically signed by: Donaldo Whelan MD  Date:    03/05/2025  Time:    15:17               Narrative:    EXAMINATION:  CT CERVICAL SPINE WITHOUT CONTRAST    CLINICAL HISTORY:  Neck pain, acute, no red flags;    TECHNIQUE:  Low dose axial images, sagittal and coronal reformations were performed though the cervical spine.  Contrast was not administered.    COMPARISON:  CT scan of the cervical spine dated 07/10/2024.    FINDINGS:  The craniocervical junction is intact.  The predental space is maintained.  No prevertebral soft tissue swelling is identified.    There is straightening of normal cervical lordosis.  There is no evidence of listhesis.  The vertebral body heights are maintained.  The C1 ring is intact.  There is hypertrophy of the posterior elements.    There is intervertebral disc space at multiple levels.  There is variable degree of spinal canal and neural foraminal narrowing.    There is no evidence acute fracture or listhesis of the cervical spine.    There is fluid within the  sphenoid sinus.  There are calcification of the neck vessels.  There are fibrotic changes in the lung apices.                                       CT Head Without Contrast (Final result)  Result time 03/05/25 14:32:01      Final result by Donaldo Whelan MD (03/05/25 14:32:01)                   Impression:      1.  Right frontal subcutaneous hematoma.  No calvarial fracture or acute intracranial process.    2.  Changes of chronic vessel ischemic disease and cerebral volume loss.      Electronically signed by: Donaldo Whelan MD  Date:    03/05/2025  Time:    14:32               Narrative:    EXAMINATION:  CT HEAD WITHOUT CONTRAST    CLINICAL HISTORY:  Mental status change, unknown cause;    TECHNIQUE:  Low dose axial images were obtained through the head.  Coronal and sagittal reformations were also performed. Contrast was not administered.    COMPARISON:  CT dated 11/07/2024.    FINDINGS:  There is a 3.5 x 1.1 cm right frontal subcutaneous hematoma.  The remainder of the subcutaneous tissues are unremarkable.  The bony calvarium is intact.  There is mucosal thickening within the ethmoid and the sphenoid sinuses.  The mastoid air cells are clear.  There are postoperative changes in the globes.    The craniocervical junction is intact.  The sellar and parasellar structures are within normal limits.  There are no extra-axial fluid collections.  There is no evidence of intracranial hemorrhage.    The ventricles and sulci are prominent, consistent cerebral loss.  There are extensive hypodensities within the periventricular and subcortical white matter.  The gray-white differentiation is maintained.  There is no dense vessel sign.  There is no evidence of mass effect.                                       X-Ray Pelvis Routine AP (Final result)  Result time 03/05/25 15:14:11      Final result by Ge Bacon MD (03/05/25 15:14:11)                   Impression:      Limited exam, as above.    Obliquely oriented lucency  projecting over the left superior pubic ramus, possibly artifactual late to air within bowel and/or skin, noting that nondisplaced fracture not excluded. Recommend clinical correlation and consideration of MRI for more sensitive and specific assessment, as warranted clinically.      Electronically signed by: Ge Bacon  Date:    03/05/2025  Time:    15:14               Narrative:    EXAMINATION:  XR PELVIS ROUTINE AP    CLINICAL HISTORY:  Unspecified fall, initial encounter    TECHNIQUE:  AP view of the pelvis was performed.    COMPARISON:  None.    FINDINGS:  Assessment compromised by suboptimal patient positioning and overlying bowel gas and stool.    Obliquely oriented lucency projecting over the left superior pubic ramus, possibly artifactual late to air within bowel and/or skin, noting that nondisplaced fracture not excluded.  Recommend clinical correlation and consideration of MRI for more sensitive and specific assessment, as warranted clinically.    Elsewhere, degenerative findings are noted involving the spine, sacroiliac joints, pubis, and hip joints.  Phleboliths appear to be within the pelvis.  Large volume colonic and rectal stool.  No evidence of radiopaque foreign body.                                       X-Ray Chest 1 View (Final result)  Result time 03/05/25 15:11:59      Final result by Ge Bacon MD (03/05/25 15:11:59)                   Impression:      No convincing evidence of acute cardiopulmonary disease.      Electronically signed by: Ge Bacon  Date:    03/05/2025  Time:    15:11               Narrative:    EXAMINATION:  XR CHEST 1 VIEW    CLINICAL HISTORY:  Altered mental status, unspecified    TECHNIQUE:  Single frontal view of the chest was performed.    COMPARISON:  Chest radiograph performed 07/31/2024    FINDINGS:  Grossly unchanged cardiac contours.  Chronic interstitial coarsening is again noted.  Suspected calcified granuloma right upper lung zone.    No definite  acute appearing focal airspace consolidation.  No evidence of pneumothorax or large volume pleural effusion.    No acute osseous or soft tissue findings.  High-riding appearance of the right humeral head, as may be seen the setting of chronic rotator cuff tear.                                       Medications   sodium chloride 0.9% bolus 500 mL 500 mL (0 mLs Intravenous Stopped 3/5/25 1625)   cefTRIAXone injection 1 g (1 g Intravenous Given 3/5/25 1540)   iopamidoL (ISOVUE-370) injection 100 mL (100 mLs Intravenous Given 3/5/25 1608)     Medical Decision Making  Sent from [facility] for evaluation of altered mental status after multiple recent falls.  Current GCS 12.  She will open her eyes spontaneously and follow simple commands but does have incomprehensible speech at this time.  We will place the patient in his cervical collar.  Given the recent traumatic mechanisms and altered mental status, we did activate the patient has a Bravo trauma.  We will perform EKG, chest x-ray, pelvis x-ray, labs, and CT head, neck, and face for further evaluation.     15:28 patient re-evaluated at this time.  She is more alert but her speech is still incomprehensible.  It is unclear of her baseline speech and mentation at this time.  CT of the head showed hematoma to the right frontal subcutaneous tissue but no fracture or other acute intracranial process.  Chest x-ray was negative.  CT C-spine shows no acute fracture.  C-collar removed at this time.  CT facial bones shows no fracture or malalignment but does show paranasal sinus disease and continued hematoma.  X-ray of the pelvis mentioned an oblique lucency projecting over the left superior pubic ramus possibly artifactual but could not exclude nondisplaced fracture.  We will obtain CT for further clarification.  Given the history of trauma, we will perform CT chest, abdomen, and pelvis with contrast.  CT scan approved by Dr. Yang.  We will follow up with [facility] telemedicine  consultation for further recommendations once complete given the case's complexity. Urinalysis shows UTI. Prior review of urine cultures shows sensitivity to Cephalosporins. Will provide Rocephin 1G IV and NS 500ml IV Bolus after CT to flush IV contrast.     15:35 Call received from Granddaughter and she does confirm that the patient's baseline is mumbled speech with short one word responses.    Amount and/or Complexity of Data Reviewed  Independent Historian:      Details: 79-year-old female sent from [facility] for evaluation of altered mental status.  They report that she is been responsive to tactile stimuli and minimally verbal with confusion.  They state that she did have a fall 2 days prior as well as an additional fall 1 week ago.  Recently reported recovery from subdural hematoma per [facility] home RN.  They do admit to decreased oral intake and a concern for dehydration as well.  Patient is a unable to provide any further history at this time.  Code status DNR per [facility] records.  Labs: ordered.     Details: CBC shows a WBC of 6.34, hemoglobin of 11.9, hematocrit 35.4, and a platelet count 234.  PTT 23.2, PT 13, INR 0.92. Lactic acid 1.2.  CMP shows a BUN/creatinine CMP shows a BUN/creatinine ratio of 22, alk-phos of 174, and otherwise unremarkable.  Magnesium normal at 2.2.  Ethanol negative at less than 10.  Urinalysis does show small leukocytes, positive nitrites, 30 protein, and small blood but otherwise unremarkable. Urine drug screen positive for amphetamine. NH reports she has recently been on Allegra and this could explain this UDS result. Microscopic urinalysis shows 11-15 wbc, many bacteria, and 0-3 RBC. Urine culture sent and pending.  Radiology: ordered.     Details: CT of the head without contrast shows right frontal subcutaneous hematoma.  No calvarial fracture or acute intracranial process.  Changes of chronic vessel ischemic disease and cerebral volume loss.    CXR: No  convincing evidence of acute cardiopulmonary disease.    XR Pelvis: Obliquely oriented lucency projecting over the left superior pubic ramus, possibly artifactual late to air within bowel and/or skin, noting that nondisplaced fracture not excluded. Recommend clinical correlation and consideration of MRI for more sensitive and specific assessment, as warranted clinically.    CT Cervical Spine: No evidence of acute fracture or listhesis of the cervical spine. Multilevel degenerative changes in the cervical spine. Paranasal sinus disease.     CT Facial Bones: No evidence of acute fracture or malalignment of the maxillofacial structures. Right frontal subcutaneous hematoma. Changes of functional endoscopic sinus surgery. Paranasal sinus disease.    CT Chest, Abd, and Pelvis: 1. Constipation. There is prominent retained feces in the colon and rectum. There is dilation and distention of the rectum. Minimal rectal wall thickening is noted may be associated with stercoral proctocolitis.  Minimal edema fluid in the presacral space posterior to the rectum also. Follow-up recommended.  2. Suspected large nonobstructing stone at the upper pole of the left kidney with limited visualization.  See above comments also.    ECG/medicine tests: ordered.     Details: EKG shows normal sinus rhythm at a rate of 63 beats per minute.  No STEMI.  Discussion of management or test interpretation with external provider(s): [facility] telemedicine consult performed with Dr. Yang.  History, presentation, physical exam findings, treatments, and response to interventions discussed in detail.  Family is now present at the bedside.  They confirmed that she is at baseline.  All results discussed with him in detail.  We will recommend Rocephin 1 g daily IM for 5 days for treatment of UTI and plan for return to nursing home with fall precautions, head injury instructions, and follow up recommendations.  Family in agreement with this plan.  Dr. Yang  in agreement with this plan.    Risk  Prescription drug management.  Risk Details: Patient presents for emergent evaluation of acute fall/AMS that poses a threat to life and/or bodily function.    Final diagnoses:  [R41.82] AMS (altered mental status)  [W19.XXXA] Fall (Primary)  [N39.0, R31.9] Urinary tract infection with hematuria, site unspecified  [R29.6] Frequent falls  [S00.83XA] Facial hematoma, initial encounter  I ordered labs and personally reviewed them.    I ordered X-rays and personally reviewed them and reviewed the radiologist interpretation.   I ordered EKG and personally reviewed it.    I ordered CT scan and personally reviewed it and reviewed the radiologist interpretation.   Diagnosis, home care, follow up, medication formation, and strict ED return precautions explained to the family incident with the patient back to the nursing home.  Patient was managed in the ED with IV normal saline and Rocephin.    The response to treatment was improved.    Patient was discharged in stable condition.  Detailed return precautions discussed.                                       Clinical Impression:   Final diagnoses:  [R41.82] AMS (altered mental status)  [W19.XXXA] Fall (Primary)  [N39.0, R31.9] Urinary tract infection with hematuria, site unspecified  [R29.6] Frequent falls  [S00.83XA] Facial hematoma, initial encounter        ED Disposition Condition    Discharge Stable          ED Prescriptions    None       Follow-up Information    None              [1]   Social History  Tobacco Use    Smoking status: Never     Passive exposure: Never    Smokeless tobacco: Never   Substance Use Topics    Alcohol use: Never    Drug use: Never        Miguel Wilson, [facility]  03/05/25 [id number]

## 2025-03-05 NOTE — ED TRIAGE NOTES
Patient comes from [facility] nursing [place] after a fall 2 days ago where she hit her head, they report she has been altered since that time

## 2025-03-07 LAB
OHS QRS DURATION: 76 MS
OHS QTC CALCULATION: 440 MS

## 2025-03-10 LAB
UA COMPLETE W REFLEX CULTURE PNL UR: ABNORMAL
UA COMPLETE W REFLEX CULTURE PNL UR: ABNORMAL

## 2025-03-11 NOTE — ADDENDUM NOTE
Encounter addended by: Beatriz Puri on: 3/11/2025 4:10 PM   Actions taken: SmartForm saved, Flowsheet accepted, Charge Capture section accepted

## 2025-03-22 ENCOUNTER — LAB REQUISITION (OUTPATIENT)
Dept: LAB | Facility: HOSPITAL | Age: 80
End: 2025-03-22
Attending: FAMILY MEDICINE
Payer: MEDICARE

## 2025-03-22 DIAGNOSIS — E11.9 TYPE 2 DIABETES MELLITUS WITHOUT COMPLICATIONS: ICD-10-CM

## 2025-03-22 DIAGNOSIS — E78.2 MIXED HYPERLIPIDEMIA: ICD-10-CM

## 2025-03-22 DIAGNOSIS — R63.0 ANOREXIA: ICD-10-CM

## 2025-03-22 LAB
ALBUMIN SERPL BCP-MCNC: 3.9 G/DL (ref 3.4–4.8)
ALP SERPL-CCNC: 177 U/L (ref 40–150)
ALT SERPL W P-5'-P-CCNC: 43 U/L
ANION GAP SERPL CALCULATED.3IONS-SCNC: 13 MMOL/L (ref 7–16)
AST SERPL W P-5'-P-CCNC: 44 U/L (ref 11–45)
BASOPHILS # BLD AUTO: 0.04 K/UL (ref 0–0.2)
BASOPHILS NFR BLD AUTO: 0.7 % (ref 0–1)
BILIRUB DIRECT SERPL-MCNC: 0.2 MG/DL
BILIRUB SERPL-MCNC: 0.3 MG/DL
BUN SERPL-MCNC: 12 MG/DL (ref 10–20)
BUN/CREAT SERPL: 15 (ref 6–20)
CALCIUM SERPL-MCNC: 9.5 MG/DL (ref 8.4–10.2)
CARBAMAZEPINE SERPL-MCNC: 6.7 ΜG/ML (ref 4–12)
CHLORIDE SERPL-SCNC: 104 MMOL/L (ref 98–107)
CHOLEST SERPL-MCNC: 164 MG/DL
CHOLEST/HDLC SERPL: 2.6 {RATIO}
CO2 SERPL-SCNC: 30 MMOL/L (ref 23–31)
CREAT SERPL-MCNC: 0.8 MG/DL (ref 0.55–1.02)
DIFFERENTIAL METHOD BLD: ABNORMAL
EGFR (NO RACE VARIABLE) (RUSH/TITUS): 76 ML/MIN/1.73M2
EOSINOPHIL # BLD AUTO: 0.13 K/UL (ref 0–0.5)
EOSINOPHIL NFR BLD AUTO: 2.3 % (ref 1–4)
ERYTHROCYTE [DISTWIDTH] IN BLOOD BY AUTOMATED COUNT: 13.1 % (ref 11.5–14.5)
EST. AVERAGE GLUCOSE BLD GHB EST-MCNC: 108 MG/DL
GLUCOSE SERPL-MCNC: 94 MG/DL (ref 82–115)
HBA1C MFR BLD HPLC: 5.4 %
HCT VFR BLD AUTO: 37.4 % (ref 38–47)
HDLC SERPL-MCNC: 62 MG/DL (ref 35–60)
HGB BLD-MCNC: 12.4 G/DL (ref 12–16)
IMM GRANULOCYTES # BLD AUTO: 0.01 K/UL (ref 0–0.04)
IMM GRANULOCYTES NFR BLD: 0.2 % (ref 0–0.4)
LDLC SERPL CALC-MCNC: 84 MG/DL
LDLC/HDLC SERPL: 1.4 {RATIO}
LYMPHOCYTES # BLD AUTO: 1.35 K/UL (ref 1–4.8)
LYMPHOCYTES NFR BLD AUTO: 24.1 % (ref 27–41)
MCH RBC QN AUTO: 30.5 PG (ref 27–31)
MCHC RBC AUTO-ENTMCNC: 33.2 G/DL (ref 32–36)
MCV RBC AUTO: 92.1 FL (ref 80–96)
MONOCYTES # BLD AUTO: 0.37 K/UL (ref 0–0.8)
MONOCYTES NFR BLD AUTO: 6.6 % (ref 2–6)
MPC BLD CALC-MCNC: 9.8 FL (ref 9.4–12.4)
NEUTROPHILS # BLD AUTO: 3.71 K/UL (ref 1.8–7.7)
NEUTROPHILS NFR BLD AUTO: 66.1 % (ref 53–65)
NONHDLC SERPL-MCNC: 102 MG/DL
NRBC # BLD AUTO: 0 X10E3/UL
NRBC, AUTO (.00): 0 %
PLATELET # BLD AUTO: 228 K/UL (ref 150–400)
POTASSIUM SERPL-SCNC: 4.5 MMOL/L (ref 3.5–5.1)
PROT SERPL-MCNC: 6.6 G/DL (ref 5.8–7.6)
RBC # BLD AUTO: 4.06 M/UL (ref 4.2–5.4)
SODIUM SERPL-SCNC: 142 MMOL/L (ref 136–145)
TRIGL SERPL-MCNC: 92 MG/DL (ref 37–140)
VLDLC SERPL-MCNC: 18 MG/DL
WBC # BLD AUTO: 5.61 K/UL (ref 4.5–11)

## 2025-03-22 PROCEDURE — 80048 BASIC METABOLIC PNL TOTAL CA: CPT | Performed by: FAMILY MEDICINE

## 2025-03-22 PROCEDURE — 83036 HEMOGLOBIN GLYCOSYLATED A1C: CPT | Performed by: FAMILY MEDICINE

## 2025-03-22 PROCEDURE — 80076 HEPATIC FUNCTION PANEL: CPT | Performed by: FAMILY MEDICINE

## 2025-03-22 PROCEDURE — 80156 ASSAY CARBAMAZEPINE TOTAL: CPT | Performed by: FAMILY MEDICINE

## 2025-03-22 PROCEDURE — 80061 LIPID PANEL: CPT | Performed by: FAMILY MEDICINE

## 2025-03-22 PROCEDURE — 85025 COMPLETE CBC W/AUTO DIFF WBC: CPT | Performed by: FAMILY MEDICINE

## 2025-06-12 ENCOUNTER — LAB REQUISITION (OUTPATIENT)
Dept: LAB | Facility: HOSPITAL | Age: 80
End: 2025-06-12
Attending: FAMILY MEDICINE
Payer: MEDICARE

## 2025-06-12 DIAGNOSIS — E11.9 TYPE 2 DIABETES MELLITUS WITHOUT COMPLICATIONS: ICD-10-CM

## 2025-06-12 DIAGNOSIS — Z79.899 OTHER LONG TERM (CURRENT) DRUG THERAPY: ICD-10-CM

## 2025-06-12 DIAGNOSIS — Z51.81 ENCOUNTER FOR THERAPEUTIC DRUG LEVEL MONITORING: ICD-10-CM

## 2025-06-12 LAB
CARBAMAZEPINE SERPL-MCNC: 8.3 ΜG/ML (ref 4–12)
EST. AVERAGE GLUCOSE BLD GHB EST-MCNC: 114 MG/DL
HBA1C MFR BLD HPLC: 5.6 %

## 2025-06-12 PROCEDURE — 80156 ASSAY CARBAMAZEPINE TOTAL: CPT | Performed by: FAMILY MEDICINE

## 2025-06-12 PROCEDURE — 83036 HEMOGLOBIN GLYCOSYLATED A1C: CPT | Performed by: FAMILY MEDICINE
